# Patient Record
Sex: MALE | Race: BLACK OR AFRICAN AMERICAN | HISPANIC OR LATINO | Employment: OTHER | URBAN - METROPOLITAN AREA
[De-identification: names, ages, dates, MRNs, and addresses within clinical notes are randomized per-mention and may not be internally consistent; named-entity substitution may affect disease eponyms.]

---

## 2017-01-03 ENCOUNTER — ALLSCRIPTS OFFICE VISIT (OUTPATIENT)
Dept: OTHER | Facility: OTHER | Age: 62
End: 2017-01-03

## 2017-01-06 ENCOUNTER — ALLSCRIPTS OFFICE VISIT (OUTPATIENT)
Dept: OTHER | Facility: OTHER | Age: 62
End: 2017-01-06

## 2017-01-06 DIAGNOSIS — R97.20 ELEVATED PROSTATE SPECIFIC ANTIGEN (PSA): ICD-10-CM

## 2017-01-06 LAB
CLARITY UR: NORMAL
COLOR UR: YELLOW
GLUCOSE (HISTORICAL): NORMAL
HGB UR QL STRIP.AUTO: NORMAL
KETONES UR STRIP-MCNC: NORMAL MG/DL
LEUKOCYTE ESTERASE UR QL STRIP: NORMAL
NITRITE UR QL STRIP: NORMAL
PH UR STRIP.AUTO: 5 [PH]
PROT UR STRIP-MCNC: NORMAL MG/DL
SP GR UR STRIP.AUTO: 1.03

## 2017-01-17 ENCOUNTER — GENERIC CONVERSION - ENCOUNTER (OUTPATIENT)
Dept: OTHER | Facility: OTHER | Age: 62
End: 2017-01-17

## 2017-01-31 ENCOUNTER — LAB CONVERSION - ENCOUNTER (OUTPATIENT)
Dept: OTHER | Facility: OTHER | Age: 62
End: 2017-01-31

## 2017-01-31 LAB — TSH SERPL DL<=0.05 MIU/L-ACNC: 1.25 MIU/L (ref 0.4–4.5)

## 2017-02-03 ENCOUNTER — ALLSCRIPTS OFFICE VISIT (OUTPATIENT)
Dept: OTHER | Facility: OTHER | Age: 62
End: 2017-02-03

## 2017-02-08 ENCOUNTER — ALLSCRIPTS OFFICE VISIT (OUTPATIENT)
Dept: OTHER | Facility: OTHER | Age: 62
End: 2017-02-08

## 2017-02-09 ENCOUNTER — LAB CONVERSION - ENCOUNTER (OUTPATIENT)
Dept: OTHER | Facility: OTHER | Age: 62
End: 2017-02-09

## 2017-02-09 ENCOUNTER — GENERIC CONVERSION - ENCOUNTER (OUTPATIENT)
Dept: OTHER | Facility: OTHER | Age: 62
End: 2017-02-09

## 2017-02-09 LAB — FECAL GLOBIN BY IMMUNOCHEM (HISTORICAL): NORMAL

## 2017-02-20 ENCOUNTER — ALLSCRIPTS OFFICE VISIT (OUTPATIENT)
Dept: OTHER | Facility: OTHER | Age: 62
End: 2017-02-20

## 2017-05-20 DIAGNOSIS — C61 MALIGNANT NEOPLASM OF PROSTATE (HCC): ICD-10-CM

## 2017-05-22 ENCOUNTER — ALLSCRIPTS OFFICE VISIT (OUTPATIENT)
Dept: OTHER | Facility: OTHER | Age: 62
End: 2017-05-22

## 2017-08-01 ENCOUNTER — LAB CONVERSION - ENCOUNTER (OUTPATIENT)
Dept: OTHER | Facility: OTHER | Age: 62
End: 2017-08-01

## 2017-08-01 LAB
A/G RATIO (HISTORICAL): 1.4 (CALC) (ref 1–2.5)
ALBUMIN SERPL BCP-MCNC: 4.2 G/DL (ref 3.6–5.1)
ALP SERPL-CCNC: 57 U/L (ref 40–115)
ALT SERPL W P-5'-P-CCNC: 16 U/L (ref 9–46)
AST SERPL W P-5'-P-CCNC: 16 U/L (ref 10–35)
BILIRUB SERPL-MCNC: 0.4 MG/DL (ref 0.2–1.2)
BUN SERPL-MCNC: 22 MG/DL (ref 7–25)
BUN/CREA RATIO (HISTORICAL): 15 (CALC) (ref 6–22)
CALCIUM (ADJUSTED FOR ALBUMIN) (HISTORICAL): 10.2 MG/DL (CALC) (ref 8.6–10.2)
CALCIUM SERPL-MCNC: 10.1 MG/DL (ref 8.6–10.3)
CHLORIDE SERPL-SCNC: 105 MMOL/L (ref 98–110)
CHOLEST SERPL-MCNC: 174 MG/DL (ref 125–200)
CHOLEST/HDLC SERPL: 3.5 (CALC)
CO2 SERPL-SCNC: 27 MMOL/L (ref 20–31)
CREAT SERPL-MCNC: 1.48 MG/DL (ref 0.7–1.33)
DEPRECATED RDW RBC AUTO: 13.8 % (ref 11–15)
EGFR AFRICAN AMERICAN (HISTORICAL): 59 ML/MIN/1.73M2
EGFR-AMERICAN CALC (HISTORICAL): 51 ML/MIN/1.73M2
GAMMA GLOBULIN (HISTORICAL): 2.9 G/DL (CALC) (ref 1.9–3.7)
GLUCOSE (HISTORICAL): 87 MG/DL (ref 65–99)
HCT VFR BLD AUTO: 50.2 % (ref 38.5–50)
HDLC SERPL-MCNC: 50 MG/DL
HGB BLD-MCNC: 15.5 G/DL (ref 13.2–17.1)
LDL CHOLESTEROL (HISTORICAL): 106 MG/DL (CALC)
MCH RBC QN AUTO: 25.1 PG (ref 27–33)
MCHC RBC AUTO-ENTMCNC: 30.9 G/DL (ref 32–36)
MCV RBC AUTO: 81.4 FL (ref 80–100)
NON-HDL-CHOL (CHOL-HDL) (HISTORICAL): 124 MG/DL (CALC)
PLATELET # BLD AUTO: 236 THOUSAND/UL (ref 140–400)
PMV BLD AUTO: 10.1 FL (ref 7.5–12.5)
POTASSIUM SERPL-SCNC: 4.3 MMOL/L (ref 3.5–5.3)
RBC # BLD AUTO: 6.17 MILLION/UL (ref 4.2–5.8)
SODIUM SERPL-SCNC: 139 MMOL/L (ref 135–146)
TOTAL PROTEIN (HISTORICAL): 7.1 G/DL (ref 6.1–8.1)
TRIGL SERPL-MCNC: 90 MG/DL
WBC # BLD AUTO: 7.8 THOUSAND/UL (ref 3.8–10.8)

## 2017-08-08 ENCOUNTER — ALLSCRIPTS OFFICE VISIT (OUTPATIENT)
Dept: OTHER | Facility: OTHER | Age: 62
End: 2017-08-08

## 2017-08-09 ENCOUNTER — GENERIC CONVERSION - ENCOUNTER (OUTPATIENT)
Dept: OTHER | Facility: OTHER | Age: 62
End: 2017-08-09

## 2017-08-09 LAB
FOLATE SERPL-MCNC: 13 NG/ML
VIT B12 SERPL-MCNC: 422 PG/ML (ref 200–1100)

## 2017-08-29 ENCOUNTER — ALLSCRIPTS OFFICE VISIT (OUTPATIENT)
Dept: OTHER | Facility: OTHER | Age: 62
End: 2017-08-29

## 2017-08-29 DIAGNOSIS — R80.9 PROTEINURIA: ICD-10-CM

## 2017-08-29 DIAGNOSIS — R31.21 ASYMPTOMATIC MICROSCOPIC HEMATURIA: ICD-10-CM

## 2017-08-29 DIAGNOSIS — I10 ESSENTIAL (PRIMARY) HYPERTENSION: ICD-10-CM

## 2017-08-29 DIAGNOSIS — N18.2 CHRONIC KIDNEY DISEASE, STAGE II (MILD): ICD-10-CM

## 2017-08-29 LAB
BILIRUB UR QL STRIP: NEGATIVE
CLARITY UR: NORMAL
COLOR UR: YELLOW
GLUCOSE (HISTORICAL): NEGATIVE
HGB UR QL STRIP.AUTO: NORMAL
KETONES UR STRIP-MCNC: NEGATIVE MG/DL
LEUKOCYTE ESTERASE UR QL STRIP: NEGATIVE
NITRITE UR QL STRIP: NEGATIVE
PH UR STRIP.AUTO: 6 [PH]
PROT UR STRIP-MCNC: 30 MG/DL
SP GR UR STRIP.AUTO: 1.01
UROBILINOGEN UR QL STRIP.AUTO: 0.2

## 2017-08-30 ENCOUNTER — TRANSCRIBE ORDERS (OUTPATIENT)
Dept: ADMINISTRATIVE | Facility: HOSPITAL | Age: 62
End: 2017-08-30

## 2017-08-30 DIAGNOSIS — R31.21 ASYMPTOMATIC MICROSCOPIC HEMATURIA: Primary | ICD-10-CM

## 2017-08-30 DIAGNOSIS — R80.9 PROTEINURIA, UNSPECIFIED TYPE: ICD-10-CM

## 2017-08-31 ENCOUNTER — HOSPITAL ENCOUNTER (OUTPATIENT)
Dept: ULTRASOUND IMAGING | Facility: HOSPITAL | Age: 62
Discharge: HOME | End: 2017-08-31
Attending: INTERNAL MEDICINE
Payer: COMMERCIAL

## 2017-08-31 DIAGNOSIS — R80.9 PROTEINURIA, UNSPECIFIED TYPE: ICD-10-CM

## 2017-08-31 DIAGNOSIS — R31.21 ASYMPTOMATIC MICROSCOPIC HEMATURIA: ICD-10-CM

## 2017-08-31 PROCEDURE — 76770 US EXAM ABDO BACK WALL COMP: CPT

## 2017-09-22 ENCOUNTER — LAB CONVERSION - ENCOUNTER (OUTPATIENT)
Dept: OTHER | Facility: OTHER | Age: 62
End: 2017-09-22

## 2017-09-22 ENCOUNTER — GENERIC CONVERSION - ENCOUNTER (OUTPATIENT)
Dept: OTHER | Facility: OTHER | Age: 62
End: 2017-09-22

## 2017-09-22 DIAGNOSIS — C61 MALIGNANT NEOPLASM OF PROSTATE (HCC): ICD-10-CM

## 2017-09-22 DIAGNOSIS — E83.52 HYPERCALCEMIA: ICD-10-CM

## 2017-09-22 LAB
BACTERIA UR QL AUTO: NORMAL /HPF
BASOPHILS # BLD AUTO: 0.3 %
BASOPHILS # BLD AUTO: 22 CELLS/UL (ref 0–200)
BUN SERPL-MCNC: 22 MG/DL (ref 7–25)
BUN/CREA RATIO (HISTORICAL): 14 (CALC) (ref 6–22)
CALCIUM SERPL-MCNC: 10.4 MG/DL (ref 8.6–10.3)
CHLORIDE SERPL-SCNC: 102 MMOL/L (ref 98–110)
CO2 SERPL-SCNC: 26 MMOL/L (ref 20–31)
CREAT SERPL-MCNC: 1.57 MG/DL (ref 0.7–1.33)
CREATININE, RANDOM URINE (HISTORICAL): 156 MG/DL (ref 20–370)
DEPRECATED RDW RBC AUTO: 14.2 % (ref 11–15)
EGFR AFRICAN AMERICAN (HISTORICAL): 55 ML/MIN/1.73M2
EGFR-AMERICAN CALC (HISTORICAL): 48 ML/MIN/1.73M2
EOSINOPHIL # BLD AUTO: 263 CELLS/UL (ref 15–500)
EOSINOPHIL # BLD AUTO: 3.6 %
GLUCOSE (HISTORICAL): 99 MG/DL (ref 65–99)
HCT VFR BLD AUTO: 50.4 % (ref 38.5–50)
HGB BLD-MCNC: 16 G/DL (ref 13.2–17.1)
HYALINE CASTS #/AREA URNS LPF: NORMAL /LPF
LYMPHOCYTES # BLD AUTO: 1628 CELLS/UL (ref 850–3900)
LYMPHOCYTES # BLD AUTO: 22.3 %
MCH RBC QN AUTO: 25.9 PG (ref 27–33)
MCHC RBC AUTO-ENTMCNC: 31.7 G/DL (ref 32–36)
MCV RBC AUTO: 81.7 FL (ref 80–100)
MONOCYTES # BLD AUTO: 599 CELLS/UL (ref 200–950)
MONOCYTES (HISTORICAL): 8.2 %
NEUTROPHILS # BLD AUTO: 4789 CELLS/UL (ref 1500–7800)
NEUTROPHILS # BLD AUTO: 65.6 %
PLATELET # BLD AUTO: 220 THOUSAND/UL (ref 140–400)
PMV BLD AUTO: 10.7 FL (ref 7.5–12.5)
POTASSIUM SERPL-SCNC: 4.2 MMOL/L (ref 3.5–5.3)
PROT UR-MCNC: 43 MG/DL (ref 5–25)
PROT/CREAT UR: 276 MG/G CREAT (ref 22–128)
RBC # BLD AUTO: 6.17 MILLION/UL (ref 4.2–5.8)
RBC (HISTORICAL): NORMAL /HPF
SODIUM SERPL-SCNC: 137 MMOL/L (ref 135–146)
SQUAMOUS EPITHELIAL CELLS (HISTORICAL): NORMAL /HPF
WBC # BLD AUTO: 7.3 THOUSAND/UL (ref 3.8–10.8)
WBC # BLD AUTO: NORMAL /HPF

## 2017-09-28 ENCOUNTER — LAB CONVERSION - ENCOUNTER (OUTPATIENT)
Dept: OTHER | Facility: OTHER | Age: 62
End: 2017-09-28

## 2017-09-28 LAB
CALCIUM SERPL-MCNC: 10.4 MG/DL (ref 8.6–10.3)
PTH-INTACT SERPL-MCNC: 58 PG/ML (ref 14–64)

## 2017-10-01 ENCOUNTER — LAB CONVERSION - ENCOUNTER (OUTPATIENT)
Dept: OTHER | Facility: OTHER | Age: 62
End: 2017-10-01

## 2017-10-01 LAB
ANGIOTENSIN CONVERTING ENZYME CSF (HISTORICAL): 6 U/L (ref 9–67)
BUN SERPL-MCNC: 21 MG/DL (ref 7–25)
BUN/CREA RATIO (HISTORICAL): 15 (CALC) (ref 6–22)
CALCIUM IONIZED (HISTORICAL): 6 MG/DL (ref 4.8–5.6)
CALCIUM SERPL-MCNC: 10.4 MG/DL (ref 8.6–10.3)
CALCIUM SERPL-MCNC: 10.4 MG/DL (ref 8.6–10.3)
CHLORIDE SERPL-SCNC: 103 MMOL/L (ref 98–110)
CO2 SERPL-SCNC: 27 MMOL/L (ref 20–31)
CREAT SERPL-MCNC: 1.41 MG/DL (ref 0.7–1.33)
EGFR AFRICAN AMERICAN (HISTORICAL): 63 ML/MIN/1.73M2
EGFR-AMERICAN CALC (HISTORICAL): 54 ML/MIN/1.73M2
GLUCOSE (HISTORICAL): 76 MG/DL (ref 65–99)
PHOSPHATE SERPL-MCNC: 3.4 MG/DL (ref 2.5–4.5)
POTASSIUM SERPL-SCNC: 4.4 MMOL/L (ref 3.5–5.3)
PTH RELATED PEPTIDE (HISTORICAL): 15 PG/ML (ref 14–27)
PTH-INTACT SERPL-MCNC: 58 PG/ML (ref 14–64)
SODIUM SERPL-SCNC: 139 MMOL/L (ref 135–146)

## 2017-10-04 ENCOUNTER — GENERIC CONVERSION - ENCOUNTER (OUTPATIENT)
Dept: OTHER | Facility: OTHER | Age: 62
End: 2017-10-04

## 2017-10-09 ENCOUNTER — GENERIC CONVERSION - ENCOUNTER (OUTPATIENT)
Dept: OTHER | Facility: OTHER | Age: 62
End: 2017-10-09

## 2017-11-02 ENCOUNTER — GENERIC CONVERSION - ENCOUNTER (OUTPATIENT)
Dept: OTHER | Facility: OTHER | Age: 62
End: 2017-11-02

## 2017-11-20 ENCOUNTER — LAB CONVERSION - ENCOUNTER (OUTPATIENT)
Dept: OTHER | Facility: OTHER | Age: 62
End: 2017-11-20

## 2017-11-20 LAB — TOTAL PROTEIN (HISTORICAL): 7 G/DL (ref 6.1–8.1)

## 2017-11-21 ENCOUNTER — LAB CONVERSION - ENCOUNTER (OUTPATIENT)
Dept: OTHER | Facility: OTHER | Age: 62
End: 2017-11-21

## 2017-11-21 LAB
ABNORMAL PROTEIN BAND 1 (HISTORICAL): 0.2 G/DL
ALBUMIN SERPL BCP-MCNC: 4.1 G/DL (ref 3.8–4.8)
ALPHA 1 (HISTORICAL): 0.3 G/DL (ref 0.2–0.3)
ALPHA 2 (HISTORICAL): 0.6 G/DL (ref 0.5–0.9)
BETA-1 (HISTORICAL): 0.5 G/DL (ref 0.4–0.6)
BETA-2 (HISTORICAL): 0.4 G/DL (ref 0.2–0.5)
GAMMA GLOBULIN (HISTORICAL): 1.2 G/DL (ref 0.8–1.7)
IMMUNOGLOBULIN KAPPA FREE LIGHT CHAIN (HISTORICAL): 23.8 MG/L (ref 3.3–19.4)
IMMUNOGLOBULIN LAMBDA FREE LIGHT CHAIN (HISTORICAL): 18.8 MG/L (ref 5.7–26.3)
INTERPRETATION (HISTORICAL): ABNORMAL
KAPPA/LAMBDA FREE LIGHT CHAIN RATIO (HISTORICAL): 1.27 (ref 0.26–1.65)
TOTAL PROTEIN (HISTORICAL): 7 G/DL (ref 6.1–8.1)

## 2017-11-22 ENCOUNTER — LAB CONVERSION - ENCOUNTER (OUTPATIENT)
Dept: OTHER | Facility: OTHER | Age: 62
End: 2017-11-22

## 2017-11-22 LAB
CREATININE, RANDOM URINE (HISTORICAL): 119 MG/DL (ref 20–370)
PROT UR-MCNC: 38 MG/DL (ref 5–25)
PROT/CREAT UR: 319 MG/G CREAT (ref 22–128)

## 2017-11-24 ENCOUNTER — LAB CONVERSION - ENCOUNTER (OUTPATIENT)
Dept: OTHER | Facility: OTHER | Age: 62
End: 2017-11-24

## 2017-11-24 LAB
ALBUMIN SERPL BCP-MCNC: 78 %
ALPHA 1 (HISTORICAL): 4 %
ALPHA 2 (HISTORICAL): 4 %
BETA-2 MICROGLOBULIN (HISTORICAL): 9 %
CREATININE, RANDOM URINE (HISTORICAL): 119 MG/DL (ref 20–370)
GAMMA GLOBULIN (HISTORICAL): 6 %
INTERPRETATION (HISTORICAL): ABNORMAL
PROT UR-MCNC: 38 MG/DL (ref 5–25)
PROT/CREAT UR: 319 MG/G CREAT (ref 22–128)

## 2017-11-27 ENCOUNTER — GENERIC CONVERSION - ENCOUNTER (OUTPATIENT)
Dept: OTHER | Facility: OTHER | Age: 62
End: 2017-11-27

## 2017-11-29 ENCOUNTER — ALLSCRIPTS OFFICE VISIT (OUTPATIENT)
Dept: OTHER | Facility: OTHER | Age: 62
End: 2017-11-29

## 2017-11-29 DIAGNOSIS — N18.2 CHRONIC KIDNEY DISEASE, STAGE II (MILD): ICD-10-CM

## 2017-11-29 DIAGNOSIS — E83.52 HYPERCALCEMIA: ICD-10-CM

## 2017-11-29 DIAGNOSIS — R31.21 ASYMPTOMATIC MICROSCOPIC HEMATURIA: ICD-10-CM

## 2017-12-01 ENCOUNTER — LAB CONVERSION - ENCOUNTER (OUTPATIENT)
Dept: OTHER | Facility: OTHER | Age: 62
End: 2017-12-01

## 2017-12-01 LAB
A/G RATIO (HISTORICAL): 1.4 (CALC) (ref 1–2.5)
ALBUMIN SERPL BCP-MCNC: 4.2 G/DL (ref 3.6–5.1)
ALP SERPL-CCNC: 54 U/L (ref 40–115)
ALT SERPL W P-5'-P-CCNC: 16 U/L (ref 9–46)
AST SERPL W P-5'-P-CCNC: 14 U/L (ref 10–35)
BACTERIA UR QL AUTO: NORMAL /HPF
BILIRUB SERPL-MCNC: 0.5 MG/DL (ref 0.2–1.2)
BUN SERPL-MCNC: 22 MG/DL (ref 7–25)
BUN/CREA RATIO (HISTORICAL): 15 (CALC) (ref 6–22)
CALCIUM IONIZED (HISTORICAL): 5.8 MG/DL (ref 4.8–5.6)
CALCIUM SERPL-MCNC: 9.9 MG/DL (ref 8.6–10.3)
CALCIUM SERPL-MCNC: 9.9 MG/DL (ref 8.6–10.3)
CHLORIDE SERPL-SCNC: 102 MMOL/L (ref 98–110)
CO2 SERPL-SCNC: 27 MMOL/L (ref 20–31)
CORTIS AM PEAK SERPL-SCNC: 15 MCG/DL
CREAT SERPL-MCNC: 1.48 MG/DL (ref 0.7–1.25)
EGFR AFRICAN AMERICAN (HISTORICAL): 59 ML/MIN/1.73M2
EGFR-AMERICAN CALC (HISTORICAL): 51 ML/MIN/1.73M2
GAMMA GLOBULIN (HISTORICAL): 2.9 G/DL (CALC) (ref 1.9–3.7)
GLUCOSE (HISTORICAL): 99 MG/DL (ref 65–99)
HYALINE CASTS #/AREA URNS LPF: NORMAL /LPF
PHOSPHATE SERPL-MCNC: 2.9 MG/DL (ref 2.5–4.5)
POTASSIUM SERPL-SCNC: 4.1 MMOL/L (ref 3.5–5.3)
PTH-INTACT SERPL-MCNC: 49 PG/ML (ref 14–64)
RBC (HISTORICAL): NORMAL /HPF
SODIUM SERPL-SCNC: 138 MMOL/L (ref 135–146)
SQUAMOUS EPITHELIAL CELLS (HISTORICAL): NORMAL /HPF
TOTAL PROTEIN (HISTORICAL): 7.1 G/DL (ref 6.1–8.1)
WBC # BLD AUTO: NORMAL /HPF

## 2017-12-04 ENCOUNTER — LAB CONVERSION - ENCOUNTER (OUTPATIENT)
Dept: OTHER | Facility: OTHER | Age: 62
End: 2017-12-04

## 2017-12-06 ENCOUNTER — LAB CONVERSION - ENCOUNTER (OUTPATIENT)
Dept: OTHER | Facility: OTHER | Age: 62
End: 2017-12-06

## 2017-12-06 LAB
A/G RATIO (HISTORICAL): 1.4 (CALC) (ref 1–2.5)
ALBUMIN SERPL BCP-MCNC: 4.2 G/DL (ref 3.6–5.1)
ALP SERPL-CCNC: 54 U/L (ref 40–115)
ALT SERPL W P-5'-P-CCNC: 16 U/L (ref 9–46)
AST SERPL W P-5'-P-CCNC: 14 U/L (ref 10–35)
BACTERIA UR QL AUTO: NORMAL /HPF
BILIRUB SERPL-MCNC: 0.5 MG/DL (ref 0.2–1.2)
BUN SERPL-MCNC: 22 MG/DL (ref 7–25)
BUN/CREA RATIO (HISTORICAL): 15 (CALC) (ref 6–22)
CALCIUM IONIZED (HISTORICAL): 5.8 MG/DL (ref 4.8–5.6)
CALCIUM SERPL-MCNC: 9.9 MG/DL (ref 8.6–10.3)
CALCIUM SERPL-MCNC: 9.9 MG/DL (ref 8.6–10.3)
CHLORIDE SERPL-SCNC: 102 MMOL/L (ref 98–110)
CO2 SERPL-SCNC: 27 MMOL/L (ref 20–31)
CORTIS AM PEAK SERPL-SCNC: 15 MCG/DL
CREAT SERPL-MCNC: 1.48 MG/DL (ref 0.7–1.25)
EGFR AFRICAN AMERICAN (HISTORICAL): 59 ML/MIN/1.73M2
EGFR-AMERICAN CALC (HISTORICAL): 51 ML/MIN/1.73M2
GAMMA GLOBULIN (HISTORICAL): 2.9 G/DL (CALC) (ref 1.9–3.7)
GLUCOSE (HISTORICAL): 99 MG/DL (ref 65–99)
HYALINE CASTS #/AREA URNS LPF: NORMAL /LPF
PHOSPHATE SERPL-MCNC: 2.9 MG/DL (ref 2.5–4.5)
POTASSIUM SERPL-SCNC: 4.1 MMOL/L (ref 3.5–5.3)
PTH RELATED PEPTIDE (HISTORICAL): 13 PG/ML (ref 14–27)
PTH-INTACT SERPL-MCNC: 49 PG/ML (ref 14–64)
RBC (HISTORICAL): NORMAL /HPF
SODIUM SERPL-SCNC: 138 MMOL/L (ref 135–146)
SQUAMOUS EPITHELIAL CELLS (HISTORICAL): NORMAL /HPF
TOTAL PROTEIN (HISTORICAL): 7.1 G/DL (ref 6.1–8.1)
WBC # BLD AUTO: NORMAL /HPF

## 2017-12-12 ENCOUNTER — LAB CONVERSION - ENCOUNTER (OUTPATIENT)
Dept: OTHER | Facility: OTHER | Age: 62
End: 2017-12-12

## 2017-12-12 LAB
A/G RATIO (HISTORICAL): 1.4 (CALC) (ref 1–2.5)
ALBUMIN SERPL BCP-MCNC: 4.2 G/DL (ref 3.6–5.1)
ALP SERPL-CCNC: 54 U/L (ref 40–115)
ALT SERPL W P-5'-P-CCNC: 16 U/L (ref 9–46)
AST SERPL W P-5'-P-CCNC: 14 U/L (ref 10–35)
BACTERIA UR QL AUTO: NORMAL /HPF
BILIRUB SERPL-MCNC: 0.5 MG/DL (ref 0.2–1.2)
BUN SERPL-MCNC: 22 MG/DL (ref 7–25)
BUN/CREA RATIO (HISTORICAL): 15 (CALC) (ref 6–22)
CALCIUM IONIZED (HISTORICAL): 5.8 MG/DL (ref 4.8–5.6)
CALCIUM SERPL-MCNC: 9.9 MG/DL (ref 8.6–10.3)
CALCIUM SERPL-MCNC: 9.9 MG/DL (ref 8.6–10.3)
CHLORIDE SERPL-SCNC: 102 MMOL/L (ref 98–110)
CO2 SERPL-SCNC: 27 MMOL/L (ref 20–31)
CORTIS AM PEAK SERPL-SCNC: 15 MCG/DL
CREAT SERPL-MCNC: 1.48 MG/DL (ref 0.7–1.25)
EGFR AFRICAN AMERICAN (HISTORICAL): 59 ML/MIN/1.73M2
EGFR-AMERICAN CALC (HISTORICAL): 51 ML/MIN/1.73M2
GAMMA GLOBULIN (HISTORICAL): 2.9 G/DL (CALC) (ref 1.9–3.7)
GLUCOSE (HISTORICAL): 99 MG/DL (ref 65–99)
HYALINE CASTS #/AREA URNS LPF: NORMAL /LPF
Lab: NORMAL
PHOSPHATE SERPL-MCNC: 2.9 MG/DL (ref 2.5–4.5)
POTASSIUM SERPL-SCNC: 4.1 MMOL/L (ref 3.5–5.3)
PTH RELATED PEPTIDE (HISTORICAL): 13 PG/ML (ref 14–27)
PTH-INTACT SERPL-MCNC: 49 PG/ML (ref 14–64)
RBC (HISTORICAL): NORMAL /HPF
SODIUM SERPL-SCNC: 138 MMOL/L (ref 135–146)
SQUAMOUS EPITHELIAL CELLS (HISTORICAL): NORMAL /HPF
TOTAL PROTEIN (HISTORICAL): 7.1 G/DL (ref 6.1–8.1)
TSH SERPL DL<=0.05 MIU/L-ACNC: 1.3 MIU/L (ref 0.4–4.5)
VITAMIN D 1,25-DIHYDROXY (HISTORICAL): 42 PG/ML
VITAMIN D 1,25-DIHYDROXY (HISTORICAL): 42 PG/ML (ref 18–72)
VITAMIN D 1,25-DIHYDROXY (HISTORICAL): <8 PG/ML
WBC # BLD AUTO: NORMAL /HPF

## 2018-01-10 NOTE — MISCELLANEOUS
Message   Recorded as Task   Date: 09/22/2017 02:25 PM, Created By: Diaz Lowry   Task Name: Care Coordination   Assigned To: Kenji Cool   Regarding Patient: Aster Barlow, Status: Active   Comment:    Diaz Lowry - 22 Sep 2017 2:25 PM     TASK CREATED  Please fax the labs I ordered today to [facility] as he gets labs done there  Thanks   [facility] to [facility]  CR      Active Problems    1  Abnormal RBC (790 09) (R71 8)   2  Adenocarcinoma of prostate (185) (C61)   3  Asymptomatic microscopic hematuria (599 72) (R31 21)   4  Colon polyps (211 3) (K63 5)   5  Dermatitis (692 9) (L30 9)   6  Encounter for screening colonoscopy (V76 51) (Z12 11)   7  Denied: History of mental disorder   8  Hypercalcemia (275 42) (E83 52)   9  Hyperlipidemia (272 4) (E78 5)   10  Hypertension (401 9) (I10)   11  Need for immunization against influenza (V04 81) (Z23)   12  Proteinuria (791 0) (R80 9)   13  Psoriasis (696 1) (L40 9)   14  Special screening examination for neoplasm of prostate (V76 44) (Z12 5)   15  Stage 2 chronic kidney disease (585 2) (N18 2)   16  Tinea cruris (110 3) (B35 6)    Current Meds   1  Lisinopril 20 MG Oral Tablet; TAKE 1 AND 1/2 TABLETS  DAILY; Therapy: 02NCQ8164 to (Evaluate:17Kay9917)  Requested for: 72Yzo3473; Last   Rx:[id number] Ordered   2  Lovastatin 40 MG Oral Tablet; Take 1 tablet daily; Therapy: 99UEB8446 to )  Requested for: 47ITG4632; Last   Rx:[id number] Ordered    Allergies    1  Fenofibrate TABS   2   Penicillins    Signatures   Electronically signed by : Roberta Dominguez DO; Sep 25 2017  1:31PM EST                       (Author)

## 2018-01-11 NOTE — RESULT NOTES
Verified Results  (Q) FECAL GLOBIN BY IMMUNOCHEMISTRY [id number] 12:00AM Leopoldo Byars     Test Name Result Flag Reference   FECAL GLOBIN BY$IMMUNOCHEMISTRY      FECAL GLOBIN BY IMMUNOCHEMISTRY         MICRO NUMBER:      [id number]    TEST STATUS:       FINAL    SPECIMEN SOURCE:   INSURE (TM) FOBT TEST CARD    SPECIMEN QUALITY:  ADEQUATE    RESULT:            Not Detected

## 2018-01-11 NOTE — RESULT NOTES
Verified Results  (Q) VITAMIN B12/FOLATE, SERUM PANEL [id number] 08:24AM Selina Shannon     Test Name Result Flag Reference   VITAMIN B12 422 pg/mL  200-1100   FOLATE, SERUM 13 0 ng/mL     Reference Range                             Low:           <3 4                             Borderline:    3 4-5 4                             Normal:        >5 4

## 2018-01-11 NOTE — MISCELLANEOUS
Message   Recorded as Task   Date: 09/22/2017 09:47 AM, Created By: Jose Rodriguez   Task Name: Care Coordination   Assigned To: Nathan Coronado   Regarding Patient: Sylvia Duarte, Status: In Progress   Comment:    Jessi Mckenna - 22 Sep 2017 9:47 AM     TASK CREATED  Please call the patient and inform him he should drink more water and repeat bloodwork next week as his calcium level is a bit high  BMP has been ordered  I think he may be dehydrated  Thanks  Anel Carrillo - 22 Sep 2017 10:00 AM     TASK IN PROGRESS   Anel Carrillo - 22 Sep 2017 10:02 AM     TASK EDITED  Left message to call office   Patient is aware he is to drink more water and repeat blood work next week  Lab order was sent to him by mail  CR      Active Problems    1  Abnormal RBC (790 09) (R71 8)   2  Adenocarcinoma of prostate (185) (C61)   3  Asymptomatic microscopic hematuria (599 72) (R31 21)   4  Colon polyps (211 3) (K63 5)   5  Dermatitis (692 9) (L30 9)   6  Encounter for screening colonoscopy (V76 51) (Z12 11)   7  Denied: History of mental disorder   8  Hypercalcemia (275 42) (E83 52)   9  Hyperlipidemia (272 4) (E78 5)   10  Hypertension (401 9) (I10)   11  Need for immunization against influenza (V04 81) (Z23)   12  Proteinuria (791 0) (R80 9)   13  Psoriasis (696 1) (L40 9)   14  Special screening examination for neoplasm of prostate (V76 44) (Z12 5)   15  Stage 2 chronic kidney disease (585 2) (N18 2)   16  Tinea cruris (110 3) (B35 6)    Current Meds   1  Lisinopril 20 MG Oral Tablet; TAKE 1 AND 1/2 TABLETS  DAILY; Therapy: 75EEE6662 to (Evaluate:00Lkc7405)  Requested for: 58Ktl1579; Last   Rx:[id number] Ordered   2  Lovastatin 40 MG Oral Tablet; Take 1 tablet daily; Therapy: 49KVK0646 to 450 39 173)  Requested for: 86YWU4745; Last   Rx:[id number] Ordered    Allergies    1  Fenofibrate TABS   2   Penicillins    Signatures   Electronically signed by : Evangelina Soni DO; Sep 22 2017 11:28AM EST (Author)

## 2018-01-12 VITALS
WEIGHT: 198 LBS | SYSTOLIC BLOOD PRESSURE: 142 MMHG | DIASTOLIC BLOOD PRESSURE: 80 MMHG | HEART RATE: 72 BPM | HEIGHT: 70 IN | BODY MASS INDEX: 28.35 KG/M2

## 2018-01-12 VITALS
DIASTOLIC BLOOD PRESSURE: 92 MMHG | HEIGHT: 70 IN | BODY MASS INDEX: 28.42 KG/M2 | WEIGHT: 198.5 LBS | SYSTOLIC BLOOD PRESSURE: 144 MMHG | HEART RATE: 72 BPM

## 2018-01-13 VITALS
HEART RATE: 80 BPM | HEIGHT: 70 IN | WEIGHT: 201 LBS | DIASTOLIC BLOOD PRESSURE: 88 MMHG | BODY MASS INDEX: 28.77 KG/M2 | SYSTOLIC BLOOD PRESSURE: 140 MMHG

## 2018-01-13 VITALS
HEART RATE: 80 BPM | DIASTOLIC BLOOD PRESSURE: 80 MMHG | SYSTOLIC BLOOD PRESSURE: 136 MMHG | BODY MASS INDEX: 28.54 KG/M2 | HEIGHT: 70 IN | WEIGHT: 199.38 LBS

## 2018-01-13 VITALS
BODY MASS INDEX: 28.63 KG/M2 | HEIGHT: 70 IN | WEIGHT: 200 LBS | DIASTOLIC BLOOD PRESSURE: 90 MMHG | HEART RATE: 72 BPM | SYSTOLIC BLOOD PRESSURE: 174 MMHG

## 2018-01-14 VITALS
WEIGHT: 204 LBS | HEART RATE: 68 BPM | DIASTOLIC BLOOD PRESSURE: 80 MMHG | BODY MASS INDEX: 29.27 KG/M2 | SYSTOLIC BLOOD PRESSURE: 150 MMHG

## 2018-01-14 VITALS
DIASTOLIC BLOOD PRESSURE: 90 MMHG | WEIGHT: 204.13 LBS | HEIGHT: 70 IN | HEART RATE: 80 BPM | BODY MASS INDEX: 29.22 KG/M2 | SYSTOLIC BLOOD PRESSURE: 160 MMHG

## 2018-01-14 VITALS
SYSTOLIC BLOOD PRESSURE: 160 MMHG | BODY MASS INDEX: 29.26 KG/M2 | HEART RATE: 78 BPM | HEIGHT: 70 IN | DIASTOLIC BLOOD PRESSURE: 100 MMHG | WEIGHT: 204.38 LBS

## 2018-01-14 VITALS
HEART RATE: 68 BPM | DIASTOLIC BLOOD PRESSURE: 82 MMHG | HEIGHT: 70 IN | BODY MASS INDEX: 28.63 KG/M2 | SYSTOLIC BLOOD PRESSURE: 144 MMHG | WEIGHT: 200 LBS

## 2018-01-14 NOTE — RESULT NOTES
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

## 2018-01-15 NOTE — MISCELLANEOUS
Message   Recorded as Task   Date: 10/02/2017 04:58 PM, Created By: Douglas Godinez   Task Name: Care Coordination   Assigned To: [address]   Regarding Patient: Julius Albrecht, Status: In Progress   Dario Ho - 02 Oct 2017 4:58 PM     TASK CREATED  Please inform the patient his calcium is still borderline high so I have ordered further workup in the form of bloodwork(SPEP/UPEP/FLC and CMP already ordered)  He should obtain this prior to next appointment with me  Will also reevaluate his calcium level with this bloodwork  Thanks   Anel Carrillo - 05 Oct 2017 12:57 PM     TASK IN PROGRESS   Anel Carrillo - 05 Oct 2017 1:37 PM     TASK EDITED  Left message to call office   Patient was informed his calcium is borderline high and he should repeat blood work  Orders for blood work were to patient by mail  CR      Active Problems    1  Abnormal RBC (790 09) (R71 8)   2  Adenocarcinoma of prostate (185) (C61)   3  Asymptomatic microscopic hematuria (599 72) (R31 21)   4  Colon polyps (211 3) (K63 5)   5  Dermatitis (692 9) (L30 9)   6  Encounter for screening colonoscopy (V76 51) (Z12 11)   7  Denied: History of mental disorder   8  Hypercalcemia (275 42) (E83 52)   9  Hyperlipidemia (272 4) (E78 5)   10  Hypertension (401 9) (I10)   11  Need for immunization against influenza (V04 81) (Z23)   12  Proteinuria (791 0) (R80 9)   13  Psoriasis (696 1) (L40 9)   14  Special screening examination for neoplasm of prostate (V76 44) (Z12 5)   15  Stage 2 chronic kidney disease (585 2) (N18 2)   16  Tinea cruris (110 3) (B35 6)    Current Meds   1  Lisinopril 20 MG Oral Tablet; TAKE 1 AND 1/2 TABLETS  DAILY; Therapy: 66ADI3975 to (Evaluate:75Eie9472)  Requested for: 92Upr2504; Last   Rx:86Tki9977 Ordered   2  Lovastatin 40 MG Oral Tablet; Take 1 tablet daily; Therapy: 87GCV4835 to [id number])  Requested for: 66ODU4007; Last   Rx:31Jan2017 Ordered    Allergies    1  Fenofibrate TABS   2  Penicillins    Signatures   Electronically signed by : Lala Guthrie DO; Oct 11 2017  5:25PM EST                       (Author)

## 2018-01-16 NOTE — RESULT NOTES
Verified Results  (Q) FECAL GLOBIN BY IMMUNOCHEMISTRY [id number] 12:00AM Milind Nadeem   REPORT COMMENT:  COLLECTION DATE [id number] AND [id number]     Test Name Result Flag Reference   FECAL GLOBIN BY$IMMUNOCHEMISTRY      FECAL GLOBIN BY IMMUNOCHEMISTRY         MICRO NUMBER:      [id number]    TEST STATUS:       FINAL    SPECIMEN SOURCE:   INSURE (TM) FOBT TEST CARD    SPECIMEN QUALITY:  ADEQUATE    RESULT:            Not Detected

## 2018-01-22 VITALS
HEART RATE: 80 BPM | BODY MASS INDEX: 28.12 KG/M2 | WEIGHT: 196 LBS | DIASTOLIC BLOOD PRESSURE: 80 MMHG | SYSTOLIC BLOOD PRESSURE: 140 MMHG

## 2018-02-04 DIAGNOSIS — C61 MALIGNANT NEOPLASM OF PROSTATE (HCC): ICD-10-CM

## 2018-02-06 LAB
ALBUMIN SERPL-MCNC: 4.2 G/DL (ref 3.6–5.1)
ALBUMIN/GLOB SERPL: 1.5 (CALC) (ref 1–2.5)
ALP SERPL-CCNC: 57 U/L (ref 40–115)
ALT SERPL-CCNC: 14 U/L (ref 9–46)
AST SERPL-CCNC: 14 U/L (ref 10–35)
BILIRUB SERPL-MCNC: 0.5 MG/DL (ref 0.2–1.2)
BUN SERPL-MCNC: 26 MG/DL (ref 7–25)
BUN/CREAT SERPL: 16 (CALC) (ref 6–22)
CALCIUM ALBUM COR SERPL-MCNC: 10.5 MG/DL (CALC) (ref 8.6–10.2)
CALCIUM SERPL-MCNC: 10.4 MG/DL (ref 8.6–10.3)
CHLORIDE SERPL-SCNC: 103 MMOL/L (ref 98–110)
CHOLEST SERPL-MCNC: 167 MG/DL
CHOLEST/HDLC SERPL: 4 (CALC)
CO2 SERPL-SCNC: 26 MMOL/L (ref 20–31)
CREAT SERPL-MCNC: 1.58 MG/DL (ref 0.7–1.25)
ERYTHROCYTE [DISTWIDTH] IN BLOOD BY AUTOMATED COUNT: 13.7 % (ref 11–15)
GLOBULIN SER CALC-MCNC: 2.8 G/DL (CALC) (ref 1.9–3.7)
GLUCOSE SERPL-MCNC: 85 MG/DL (ref 65–99)
HCT VFR BLD AUTO: 49.2 % (ref 38.5–50)
HDLC SERPL-MCNC: 42 MG/DL
HGB BLD-MCNC: 15.8 G/DL (ref 13.2–17.1)
LDLC SERPL CALC-MCNC: 104 MG/DL (CALC)
MCH RBC QN AUTO: 26.1 PG (ref 27–33)
MCHC RBC AUTO-ENTMCNC: 32.1 G/DL (ref 32–36)
MCV RBC AUTO: 81.2 FL (ref 80–100)
NONHDLC SERPL-MCNC: 125 MG/DL (CALC)
PLATELET # BLD AUTO: 239 THOUSAND/UL (ref 140–400)
PMV BLD REES-ECKER: 10.5 FL (ref 7.5–12.5)
POTASSIUM SERPL-SCNC: 4.3 MMOL/L (ref 3.5–5.3)
PROT SERPL-MCNC: 7 G/DL (ref 6.1–8.1)
RBC # BLD AUTO: 6.06 MILLION/UL (ref 4.2–5.8)
SL AMB EGFR AFRICAN AMERICAN: 54 ML/MIN/1.73M2
SL AMB EGFR NON AFRICAN AMERICAN: 47 ML/MIN/1.73M2
SODIUM SERPL-SCNC: 137 MMOL/L (ref 135–146)
TRIGL SERPL-MCNC: 115 MG/DL
TSH SERPL-ACNC: 1.6 MIU/L (ref 0.4–4.5)
WBC # BLD AUTO: 7.9 THOUSAND/UL (ref 3.8–10.8)

## 2018-02-08 DIAGNOSIS — C61 PROSTATE CANCER (HCC): Primary | ICD-10-CM

## 2018-02-08 RX ORDER — CIPROFLOXACIN 500 MG/1
500 TABLET, FILM COATED ORAL EVERY 12 HOURS SCHEDULED
Qty: 6 TABLET | Refills: 0 | Status: SHIPPED | OUTPATIENT
Start: 2018-02-08 | End: 2018-02-11

## 2018-02-13 ENCOUNTER — OFFICE VISIT (OUTPATIENT)
Dept: FAMILY MEDICINE CLINIC | Facility: CLINIC | Age: 63
End: 2018-02-13
Payer: COMMERCIAL

## 2018-02-13 VITALS
HEIGHT: 70 IN | BODY MASS INDEX: 28.35 KG/M2 | HEART RATE: 64 BPM | SYSTOLIC BLOOD PRESSURE: 136 MMHG | DIASTOLIC BLOOD PRESSURE: 86 MMHG | WEIGHT: 198 LBS

## 2018-02-13 DIAGNOSIS — Z12.11 SCREEN FOR COLON CANCER: ICD-10-CM

## 2018-02-13 DIAGNOSIS — E78.2 MIXED HYPERLIPIDEMIA: ICD-10-CM

## 2018-02-13 DIAGNOSIS — N18.2 STAGE 2 CHRONIC KIDNEY DISEASE: ICD-10-CM

## 2018-02-13 DIAGNOSIS — I10 ESSENTIAL HYPERTENSION: Primary | ICD-10-CM

## 2018-02-13 PROBLEM — R80.9 PROTEINURIA: Status: ACTIVE | Noted: 2017-08-29

## 2018-02-13 PROBLEM — R71.8 ABNORMAL RBC: Status: ACTIVE | Noted: 2017-08-08

## 2018-02-13 PROBLEM — C61 ADENOCARCINOMA OF PROSTATE (HCC): Status: ACTIVE | Noted: 2017-08-08

## 2018-02-13 PROBLEM — R31.21 ASYMPTOMATIC MICROSCOPIC HEMATURIA: Status: ACTIVE | Noted: 2017-08-29

## 2018-02-13 PROBLEM — L40.9 PSORIASIS: Status: ACTIVE | Noted: 2017-02-08

## 2018-02-13 PROCEDURE — 99214 OFFICE O/P EST MOD 30 MIN: CPT | Performed by: FAMILY MEDICINE

## 2018-02-13 RX ORDER — AMLODIPINE BESYLATE 5 MG/1
5 TABLET ORAL DAILY
Qty: 90 TABLET | Refills: 3 | Status: SHIPPED | OUTPATIENT
Start: 2018-02-13 | End: 2018-07-23 | Stop reason: SINTOL

## 2018-02-13 RX ORDER — LISINOPRIL 20 MG/1
20 TABLET ORAL DAILY
COMMUNITY
Start: 2017-01-03 | End: 2018-12-20 | Stop reason: SDUPTHER

## 2018-02-13 RX ORDER — LOVASTATIN 40 MG/1
1 TABLET ORAL DAILY
COMMUNITY
Start: 2015-03-13 | End: 2019-02-10 | Stop reason: SDUPTHER

## 2018-02-13 NOTE — PROGRESS NOTES
Assessment/Plan:    Hypertension  Patient's blood pressure has become erratic at home because he takes his blood pressure on a regular basis  His blood pressure today is 136/86 and Norvasc 5 mg daily was added to his regimen of lisinopril 20 mg daily  He is also going to cut back on his caffeine consumption  Stage 2 chronic kidney disease  The patient's creatinine went up from 1 4 to 1 5 any does see a nephrologist on a regular basis  Hyperlipidemia  His cholesterol numbers are stable with the , he is currently on lovastatin 40 mg daily  No change in medication  Diagnoses and all orders for this visit:    Essential hypertension  -     amLODIPine (NORVASC) 5 mg tablet; Take 1 tablet (5 mg total) by mouth daily for 90 days  -     Comprehensive metabolic panel; Future  -     Lipid Panel with Direct LDL reflex; Future    Mixed hyperlipidemia  -     Comprehensive metabolic panel; Future  -     Lipid Panel with Direct LDL reflex; Future    Stage 2 chronic kidney disease  -     Comprehensive metabolic panel; Future  -     Lipid Panel with Direct LDL reflex; Future    Screen for colon cancer  -     Occult Bloood,Fecal Immunochemical; Future    Other orders  -     lisinopril (ZESTRIL) 20 mg tablet; Take 1 tablet by mouth daily  -     lovastatin (MEVACOR) 40 MG tablet; Take 1 tablet by mouth daily          Subjective:      Patient ID: Pal Melendez is a 63 y o  male  CC:  Follow up hyperlipidemia, HTN  Review BW results  Pt states he had wrist pain last week  States he has recently taken up drumming and questions if he is doing something wrong  - [facility]    HPI:  This is a 82-year-old male who has come in for his regular 6 month check  He has noticed that his blood pressure has become a radical because he checks his blood pressures at home  He is also drinking a little more caffeine than usual and was advised to cut back on the caffeine    Amlodipine was added at 5 mg daily to try to settle down his blood pressure and control it  His weight is down 9 lb from the previous visit to 198 lb  His blood pressure today is 136/86  Reviewing his labs his CBC is within normal limits, glucose is 85, creatinine went up from 1 4 to 1 5 and he does see a nephrologist   His TSH is within normal range  His cholesterol numbers are stable with the   He sees Urology for his prostate cancer  In his half-way, he has taken up drumming and noticed that he was developing a tendonitis of the right wrist and has backed off on the drumming for a while and the pain has totally resolved  The following portions of the patient's history were reviewed and updated as appropriate: allergies, current medications, past family history, past medical history, past social history, past surgical history and problem list     Review of Systems   Constitutional: Negative  HENT: Negative  Eyes: Negative  Respiratory: Negative  Cardiovascular: Negative  Gastrointestinal: Negative  Endocrine: Negative  Genitourinary: Negative  Musculoskeletal: Negative  Skin: Negative  Allergic/Immunologic: Negative  Neurological: Negative  Hematological: Negative  Psychiatric/Behavioral: Negative  Vitals:    02/13/18 0733   BP: 136/86   BP Location: Left arm   Patient Position: Standing   Cuff Size: Large   Pulse: 64   Weight: 89 8 kg (198 lb)   Height: 5' 10" (1 778 m)   Objective:    Vitals:    02/13/18 0733   BP: 136/86   Pulse: 64        Physical Exam   Constitutional: He is oriented to person, place, and time  He appears well-developed and well-nourished  HENT:   Head: Normocephalic  Right Ear: External ear normal    Left Ear: External ear normal    Mouth/Throat: Oropharynx is clear and moist    Eyes: Conjunctivae and EOM are normal  Pupils are equal, round, and reactive to light  Neck: Normal range of motion  Neck supple     Cardiovascular: Normal rate, regular rhythm and normal heart sounds  Pulmonary/Chest: Effort normal and breath sounds normal    Abdominal: Soft  Bowel sounds are normal    Musculoskeletal: Normal range of motion  Neurological: He is alert and oriented to person, place, and time  Skin: Skin is warm  Psychiatric: He has a normal mood and affect   His behavior is normal  Judgment and thought content normal

## 2018-02-13 NOTE — ASSESSMENT & PLAN NOTE
Patient's blood pressure has become erratic at home because he takes his blood pressure on a regular basis  His blood pressure today is 136/86 and Norvasc 5 mg daily was added to his regimen of lisinopril 20 mg daily  He is also going to cut back on his caffeine consumption

## 2018-02-13 NOTE — ASSESSMENT & PLAN NOTE
His cholesterol numbers are stable with the , he is currently on lovastatin 40 mg daily  No change in medication

## 2018-02-16 ENCOUNTER — PROCEDURE VISIT (OUTPATIENT)
Dept: UROLOGY | Facility: CLINIC | Age: 63
End: 2018-02-16
Payer: COMMERCIAL

## 2018-02-16 VITALS
SYSTOLIC BLOOD PRESSURE: 156 MMHG | HEART RATE: 86 BPM | DIASTOLIC BLOOD PRESSURE: 96 MMHG | HEIGHT: 70 IN | BODY MASS INDEX: 28.4 KG/M2 | WEIGHT: 198.4 LBS

## 2018-02-16 DIAGNOSIS — C61 ADENOCARCINOMA OF PROSTATE (HCC): ICD-10-CM

## 2018-02-16 PROCEDURE — 88342 IMHCHEM/IMCYTCHM 1ST ANTB: CPT | Performed by: UROLOGY

## 2018-02-16 PROCEDURE — 55700 PR BIOPSY OF PROSTATE,NEEDLE/PUNCH: CPT | Performed by: UROLOGY

## 2018-02-16 PROCEDURE — 88344 IMHCHEM/IMCYTCHM EA MLT ANTB: CPT | Performed by: UROLOGY

## 2018-02-16 PROCEDURE — G0416 PROSTATE BIOPSY, ANY MTHD: HCPCS | Performed by: UROLOGY

## 2018-02-16 PROCEDURE — 76942 ECHO GUIDE FOR BIOPSY: CPT | Performed by: UROLOGY

## 2018-02-16 PROCEDURE — 76872 US TRANSRECTAL: CPT | Performed by: UROLOGY

## 2018-02-16 NOTE — PROGRESS NOTES
Jimbo Calderon is a 79-year-old male with a history of a PSA of 4 4  This prompted a transrectal ultrasound-guided biopsy of the prostate which was performed in February of 2017  Two cores out of 12 positive for [place] 3 + 3 = 6/10 prostate cancer involving between 25 and 30% of the cores  He has opted for active surveillance  He returns in follow-up today  A recent PSA was performed to [facility] and is not available for my review at this time  He presents to undergo a surveillance prostate biopsy  Preparation was deemed to be adequate  Prostate Biopsy note: The patient returns to the office today to undergo a transrectal ultrasound-guided biopsy of the prostate secondary to an elevated PSA  Risk and benefits of the procedure were discussed  Informed consent was obtained  The patient's prebiopsy preparation was deemed to be adequate  Antibiotics had been taken as prescribed  If appropriate blood thinners had been placed on hold  The patient completed an enema as prescribed  The patient was placed in the lateral decubitus position  Digital rectal examination was performed revealing a 80 gram prostate  Viscous lidocaine jelly was instilled into the rectum  Transrectal ultrasonography was then performed  The prostate measured 94 65 grams  5 cc of 2% lidocaine were then injected bilaterally between the junction of the base of the prostate and the seminal vesicles  Ultrasound guidance was utilized to place the needle into proper position for the administration of the local anesthetic  A standard 12 core biopsy was then performed  4 cores were taken from the right peripheral zone  2 cores were taken from the right central zone  4 cores were taken from the left peripheral zone  2 cores were taken from the left central zone  Overall the patient tolerated the procedure and there were no complications        My overall impression status post transrectal ultrasound and biopsy the prostate secondary to an elevated PSA, and small volume prostate cancer [place] 6  I have recommended rest and completing antibiotics  Possible postbiopsy sequelae were discussed including hematuria and hematospermia  I've asked that he return in the next one to 2 weeks to review the results of the biopsy

## 2018-02-21 ENCOUNTER — OFFICE VISIT (OUTPATIENT)
Dept: NEPHROLOGY | Facility: CLINIC | Age: 63
End: 2018-02-21
Payer: COMMERCIAL

## 2018-02-21 VITALS
BODY MASS INDEX: 28.75 KG/M2 | WEIGHT: 200.8 LBS | HEIGHT: 70 IN | SYSTOLIC BLOOD PRESSURE: 130 MMHG | DIASTOLIC BLOOD PRESSURE: 62 MMHG

## 2018-02-21 DIAGNOSIS — E83.52 HYPERCALCEMIA: ICD-10-CM

## 2018-02-21 DIAGNOSIS — R80.9 PROTEINURIA, UNSPECIFIED TYPE: ICD-10-CM

## 2018-02-21 DIAGNOSIS — I10 ESSENTIAL HYPERTENSION: ICD-10-CM

## 2018-02-21 DIAGNOSIS — N18.2 STAGE 2 CHRONIC KIDNEY DISEASE: Primary | ICD-10-CM

## 2018-02-21 DIAGNOSIS — R31.21 ASYMPTOMATIC MICROSCOPIC HEMATURIA: ICD-10-CM

## 2018-02-21 PROCEDURE — 99213 OFFICE O/P EST LOW 20 MIN: CPT | Performed by: INTERNAL MEDICINE

## 2018-02-21 NOTE — PATIENT INSTRUCTIONS
1  Chronic kidney disease stage 2 in setting of prostatic adenocarcinoma - eGFR ~70s ml/min per CKD EPI equation  Last sCr 1 58  -continue to avoid nonsteroidals(motrin, aleve, advil, ibuprofen, toradol, naproxen, celebrex, indomethacin and meloxicam)  -very low risk of progression to End stage disease   -renal u/s August 2017 shows echogenic kidneys with b/l renal cysts  The cause of this is unclear  +protein in urine (urine protein:Cr 319mg/g as of 11/2017)    2  HTN - BP well controlled  - continue lisinopril 20mg daily, amlodipine 5mg daily  3  Proteinuria - noted on UA  UpCr 319mg/g, slightly higher than prior  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  Will monitor this on lisinopril as above    4  Prostate cancer - need biopsy shows hugo score 6, cancer diagnosis favored  Appears to be prognostic grade group 1     5  microscopic hematuria - does not appear to be glomerular in origin and UA shows only 0-2 RBCs as of 11/2017  Repeat UA with microscopy    6  Hypercalcemia  - calcium 10 5 as of 2/5/18 and slightly high iCal as of 11/30/017  - check CMP, SPEP with IF as faint M band, alk phos  -PTHrP ok, 1,25 vitamin D level ok, PTH normal, phos normal, ACE negative  -will also check CBC  RTC in 3 months

## 2018-02-21 NOTE — PROGRESS NOTES
NEPHROLOGY OUTPATIENT PROGRESS NOTE   Dewane Osgood 63 y o  male MRN: [id number]  DATE: 2/21/2018  Reason for visit:   Chief Complaint   Patient presents with    Chronic Kidney Disease        There are no Patient Instructions on file for this visit  Rodriguez Funez was seen today for chronic kidney disease  Diagnoses and all orders for this visit:    Stage 2 chronic kidney disease  -     Comprehensive metabolic panel; Future  -     Alkaline phosphatase; Future  -     CBC and differential; Future  -     Protein,Total and Protein Electrophoresis,24 Hour Urine and Immunofixation; Future    Proteinuria, unspecified type  -     UA (URINE) with reflex to Microscopic; Future    Essential hypertension    Asymptomatic microscopic hematuria  -     UA (URINE) with reflex to Microscopic; Future    Hypercalcemia  -     Comprehensive metabolic panel; Future  -     CBC and differential; Future  -     Protein,Total and Protein Electrophoresis,24 Hour Urine and Immunofixation; Future        Assessment/Plan:  1  Chronic kidney disease stage 2 in setting of prostatic adenocarcinoma - eGFR ~70s ml/min per CKD EPI equation  Last sCr 1 58  -continue to avoid nonsteroidals(motrin, aleve, advil, ibuprofen, toradol, naproxen, celebrex, indomethacin and meloxicam)  -very low risk of progression to End stage disease   -renal u/s August 2017 shows echogenic kidneys with b/l renal cysts  The cause of this is unclear  +protein in urine (urine protein:Cr 319mg/g as of 11/2017)    2  HTN - BP well controlled  - continue lisinopril 20mg daily, amlodipine 5mg daily  3  Proteinuria - noted on UA  UpCr 319mg/g, slightly higher than prior  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  Will monitor this on lisinopril as above    4  Prostate cancer - need biopsy shows hugo score 6, cancer diagnosis favored   Appears to be prognostic grade group 1     5  microscopic hematuria - does not appear to be glomerular in origin and UA shows only 0-2 RBCs as of 11/2017  Repeat UA with microscopy    6  Hypercalcemia  - calcium 10 5 as of 2/5/18 and slightly high iCal as of 11/30/017  - check CMP, SPEP with IF as faint M band, alk phos  -PTHrP ok, 1,25 vitamin D level ok, PTH normal, phos normal, ACE negative  -will also check CBC  RTC in 3 months  SUBJECTIVE / INTERVAL HISTORY:  63 y o  male presents in follow up of CKD  Manuel Mendoza denies any recent illness/hospitalizations/medication changes since last office visit  Denies NSAID use  BP at home has been 120-160s range  Sometimes higher in the mornings  Has recently begun amlodipine 5mg daily  S/p recent prostate needle biopsy  Usually has to urinate twice in the morning and then his stream is fine  He has seen slight blood in the urine but it is clearing since the biopsy  Review of Systems   Constitutional: Negative for chills and fever  HENT: Negative for sore throat  Eyes: Negative for visual disturbance  Respiratory: Negative for cough and shortness of breath  Cardiovascular: Negative for chest pain and leg swelling  Gastrointestinal: Negative for abdominal pain, constipation, diarrhea, nausea and vomiting  Endocrine: Negative for polyuria  Genitourinary: Positive for hematuria (as per HPI)  Negative for decreased urine volume, difficulty urinating and dysuria  Musculoskeletal: Negative for back pain and myalgias  Skin: Negative for rash  Neurological: Negative for dizziness, light-headedness and numbness  Psychiatric/Behavioral: Negative for confusion  The patient is not nervous/anxious  OBJECTIVE:  /62 (BP Location: Left arm, Patient Position: Sitting, Cuff Size: Standard)   Ht 5' 10" (1 778 m)   Wt 91 1 kg (200 lb 12 8 oz)   BMI 28 81 kg/m²  Body mass index is 28 81 kg/m²  Physical exam:  Physical Exam   Constitutional: He appears well-developed and well-nourished  No distress     HENT:   Head: Normocephalic and atraumatic  Mouth/Throat: No oropharyngeal exudate  Eyes: Right eye exhibits no discharge  Left eye exhibits no discharge  No scleral icterus  Neck: Normal range of motion  Neck supple  Cardiovascular: Normal rate, regular rhythm and normal heart sounds  Pulmonary/Chest: Effort normal and breath sounds normal  He has no wheezes  He has no rales  Abdominal: Soft  Bowel sounds are normal  He exhibits no distension  There is no tenderness  Musculoskeletal: Normal range of motion  He exhibits no edema  Neurological: He is alert  Skin: Skin is warm and dry  No rash noted  He is not diaphoretic  Psychiatric: He has a normal mood and affect  His behavior is normal    Vitals reviewed  Medications:    Current Outpatient Prescriptions:     amLODIPine (NORVASC) 5 mg tablet, Take 1 tablet (5 mg total) by mouth daily for 90 days, Disp: 90 tablet, Rfl: 3    lisinopril (ZESTRIL) 20 mg tablet, Take 1 tablet by mouth daily, Disp: , Rfl:     lovastatin (MEVACOR) 40 MG tablet, Take 1 tablet by mouth daily, Disp: , Rfl:     Allergies:   Allergies as of 02/21/2018 - Reviewed 02/21/2018   Allergen Reaction Noted    Penicillins  08/29/2017    Fenofibrate Itching and Rash 07/10/2015       The following portions of the patient's history were reviewed and updated as appropriate: past family history, past surgical history and problem list     Laboratory Results:  Lab Results   Component Value Date    CALCIUM 10 4 (H) 02/05/2018     11/30/2017     11/30/2017     11/30/2017     11/30/2017    K 4 1 11/30/2017    K 4 1 11/30/2017    K 4 1 11/30/2017    K 4 1 11/30/2017    CO2 27 11/30/2017    CO2 27 11/30/2017    CO2 27 11/30/2017    CO2 27 11/30/2017     11/30/2017     11/30/2017     11/30/2017     11/30/2017    BUN 26 (H) 02/05/2018    CREATININE 1 58 (H) 02/05/2018        Lab Results   Component Value Date    PTH 49 11/30/2017    PTH 49 11/30/2017    PTH 49 11/30/2017    PTH 49 11/30/2017    CALCIUM 10 4 (H) 02/05/2018    CAION 5 8 (H) 11/30/2017    CAION 5 8 (H) 11/30/2017    CAION 5 8 (H) 11/30/2017    CAION 5 8 (H) 11/30/2017    PHOS 2 9 11/30/2017    PHOS 2 9 11/30/2017    PHOS 2 9 11/30/2017    PHOS 2 9 11/30/2017       Portions of the record may have been created with voice recognition software   Occasional wrong word or "sound a like" substitutions may have occurred due to the inherent limitations of voice recognition software   Read the chart carefully and recognize, using context, where substitutions have occurred

## 2018-02-21 NOTE — LETTER
February 21, 2018     Crispin Gutierrez PA-C  [address] [place] [address] [address]    Patient: Amanda Peña   YOB: 1955   Date of Visit: 2/21/2018       Dear Dr Nadeem Juan:    Thank you for referring Amanda Peña to me for evaluation  Below are my notes for this consultation  If you have questions, please do not hesitate to call me  I look forward to following your patient along with you  Sincerely,        Bishop Robles DO        CC: No Recipients  Bishop Robles DO  2/21/2018  3:15 PM  Sign at close encounter  [address] [facility] NOTE   Amanda Peña 63 y o  male MRN: [id number]  DATE: 2/21/2018  Reason for visit:   Chief Complaint   Patient presents with    Chronic Kidney Disease        There are no Patient Instructions on file for this visit  Diagnoses and all orders for this visit:    Stage 2 chronic kidney disease    Proteinuria, unspecified type    Essential hypertension    Asymptomatic microscopic hematuria        Assessment/Plan:  1  Chronic kidney disease stage 2 in setting of prostatic adenocarcinoma - eGFR ~70s ml/min per CKD EPI equation  Last sCr 1 58  -continue to avoid nonsteroidals(motrin, aleve, advil, ibuprofen, toradol, naproxen, celebrex, indomethacin and meloxicam)  -very low risk of progression to End stage disease   -renal u/s August 2017 shows echogenic kidneys with b/l renal cysts  The cause of this is unclear  +protein in urine (urine protein:Cr 319mg/g as of 11/2017)    2  HTN - BP well controlled,  - continue lisinopril 20mg daily, amlodipine 5mg daily  3  Proteinuria - noted on UA  UpCr 319mg/g, slightly higher than prior  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  Will monitor this on lisinopril as above    4  Prostate cancer - need biopsy shows hugo score 6, cancer diagnosis favored   Appears to be prognostic grade group 1     5  microscopic hematuria - does not appear to be glomerular in origin and UA shows only 0-2 RBCs as of 11/2017  Repeat UA with microscopy    6  Hypercalcemia  - calcium 10 5 as of 2/5/18 and slightly high iCal as of 11/30/017  - check CMP, SPEP with IF as faint M band, alk phos, ACE negative  -PTHrp ok, 1,25 vitamin D level ok, PTH normal, phos normal    RTC in 3 months  SUBJECTIVE / INTERVAL HISTORY:  63 y o  male presents in follow up of CKD  Reji Dye denies any recent illness/hospitalizations/medication changes since last office visit  ?NSAID use    BP at home    Review of Systems   Constitutional: Negative for chills and fever  HENT: Negative for sore throat  Eyes: Negative for visual disturbance  Respiratory: Negative for cough and shortness of breath  Cardiovascular: Negative for chest pain and leg swelling  Gastrointestinal: Negative for abdominal pain, constipation, diarrhea, nausea and vomiting  Endocrine: Negative for polyuria  Genitourinary: Negative for decreased urine volume, difficulty urinating and dysuria  Musculoskeletal: Negative for back pain and myalgias  Skin: Negative for rash  Neurological: Negative for light-headedness and numbness  Psychiatric/Behavioral: Negative for confusion  OBJECTIVE:  /62 (BP Location: Left arm, Patient Position: Sitting, Cuff Size: Standard)   Ht 5' 10" (1 778 m)   Wt 91 1 kg (200 lb 12 8 oz)   BMI 28 81 kg/m²   Body mass index is 28 81 kg/m²  Physical exam:  Physical Exam   Constitutional: He appears well-developed and well-nourished  No distress  HENT:   Head: Normocephalic and atraumatic  Mouth/Throat: No oropharyngeal exudate  Eyes: Right eye exhibits no discharge  Left eye exhibits no discharge  No scleral icterus  Neck: Normal range of motion  Neck supple  Cardiovascular: Normal rate, regular rhythm and normal heart sounds  Pulmonary/Chest: Effort normal and breath sounds normal  He has no wheezes  He has no rales  Abdominal: Soft  Bowel sounds are normal  He exhibits no distension  There is no tenderness  Musculoskeletal: Normal range of motion  He exhibits no edema  Neurological: He is alert  Skin: Skin is warm and dry  No rash noted  He is not diaphoretic  Psychiatric: He has a normal mood and affect  His behavior is normal    Vitals reviewed  Medications:    Current Outpatient Prescriptions:     amLODIPine (NORVASC) 5 mg tablet, Take 1 tablet (5 mg total) by mouth daily for 90 days, Disp: 90 tablet, Rfl: 3    lisinopril (ZESTRIL) 20 mg tablet, Take 1 tablet by mouth daily, Disp: , Rfl:     lovastatin (MEVACOR) 40 MG tablet, Take 1 tablet by mouth daily, Disp: , Rfl:     Allergies:   Allergies as of 02/21/2018 - Reviewed 02/21/2018   Allergen Reaction Noted    Penicillins  08/29/2017    Fenofibrate Itching and Rash 07/10/2015       The following portions of the patient's history were reviewed and updated as appropriate: past family history, past surgical history and problem list     Laboratory Results:  Lab Results   Component Value Date    CALCIUM 10 4 (H) 02/05/2018     11/30/2017     11/30/2017     11/30/2017     11/30/2017    K 4 1 11/30/2017    K 4 1 11/30/2017    K 4 1 11/30/2017    K 4 1 11/30/2017    CO2 27 11/30/2017    CO2 27 11/30/2017    CO2 27 11/30/2017    CO2 27 11/30/2017     11/30/2017     11/30/2017     11/30/2017     11/30/2017    BUN 26 (H) 02/05/2018    CREATININE 1 58 (H) 02/05/2018        Lab Results   Component Value Date    PTH 49 11/30/2017    PTH 49 11/30/2017    PTH 49 11/30/2017    PTH 49 11/30/2017    CALCIUM 10 4 (H) 02/05/2018    CAION 5 8 (H) 11/30/2017    CAION 5 8 (H) 11/30/2017    CAION 5 8 (H) 11/30/2017    CAION 5 8 (H) 11/30/2017    PHOS 2 9 11/30/2017    PHOS 2 9 11/30/2017    PHOS 2 9 11/30/2017    PHOS 2 9 11/30/2017       Portions of the record may have been created with voice recognition software   Occasional wrong word or "sound a like" substitutions may have occurred due to the inherent limitations of voice recognition software   Read the chart carefully and recognize, using context, where substitutions have occurred

## 2018-03-02 ENCOUNTER — OFFICE VISIT (OUTPATIENT)
Dept: UROLOGY | Facility: CLINIC | Age: 63
End: 2018-03-02
Payer: COMMERCIAL

## 2018-03-02 VITALS
DIASTOLIC BLOOD PRESSURE: 90 MMHG | WEIGHT: 199 LBS | SYSTOLIC BLOOD PRESSURE: 150 MMHG | HEART RATE: 82 BPM | BODY MASS INDEX: 28.49 KG/M2 | HEIGHT: 70 IN

## 2018-03-02 DIAGNOSIS — C61 ADENOCARCINOMA OF PROSTATE (HCC): Primary | ICD-10-CM

## 2018-03-02 PROCEDURE — 99214 OFFICE O/P EST MOD 30 MIN: CPT | Performed by: UROLOGY

## 2018-03-02 NOTE — PROGRESS NOTES
3/2/2018    Ed Burroughs  1955  [id number]        Assessment  Very low risk, small volume [place] 6 prostate cancer      Discussion  The patient I had a lengthy discussion in the office today concerning his active surveillance for small volume Deanna 6 prostate cancer  I recommend continuing active surveillance as 3 cores out of 12 reveal 5-30 percent [place] 3 + 3 = 6/10 prostate cancer  This is minimally changed compared to his initial biopsy in February of 2017 when 2/12 cores were positive for Deanna 3 + 3 = 6/10 prostate cancer involving between 25 in 30 percent of the cores  The patient is amenable with this plan  I recommend follow-up in 4 months with his next PSA  In early 2019 we discussed possibly performing his 3rd biopsy versus continued observation versus a multiparametric MRI of the prostate  History of Present Illness  63 y o  male with a history of small volume Deanna 6 prostate cancer  In 2000 February 17, 2012 cores were positive for [place] 6 disease between 25 in 30 percent of the core  He recently returned undergo repeat prostate biopsy  PSA was 4 4 at that time  Pathology revealed 3 of 12 cores positive for Deanna 6 cancer between 5 in 30 percent of the cores  He denies any post biopsy sequelae  He returns to the office today to discuss her review pathology            AUA Symptom Score      Review of Systems  Review of Systems      Past Medical History  Past Medical History:   Diagnosis Date    Adenocarcinoma of prostate ([address] )     Last assessed 08/08/17    Hypertension        Past Social History  Past Surgical History:   Procedure Laterality Date    PROSTATE BIOPSY  02/16/2018    TONSILLECTOMY         Past Family History  Family History   Problem Relation Age of Onset    Arthritis Mother     Hyperlipidemia Mother     Hypertension Mother     Diabetes Son        Past Social history  Social History     Social History    Marital status: /Civil Union Spouse name: N/A    Number of children: N/A    Years of education: N/A     Occupational History    retired      Social History Main Topics    Smoking status: Never Smoker    Smokeless tobacco: Never Used    Alcohol use Yes      Comment: socially    Drug use: No    Sexual activity: Not on file     Other Topics Concern    Not on file     Social History Narrative    No narrative on file       Current Medications  Current Outpatient Prescriptions   Medication Sig Dispense Refill    amLODIPine (NORVASC) 5 mg tablet Take 1 tablet (5 mg total) by mouth daily for 90 days 90 tablet 3    lisinopril (ZESTRIL) 20 mg tablet Take 1 tablet by mouth daily      lovastatin (MEVACOR) 40 MG tablet Take 1 tablet by mouth daily       No current facility-administered medications for this visit  Allergies  Allergies   Allergen Reactions    Penicillins     Fenofibrate Itching and Rash       Past Medical History, Social History, Family History, medications and allergies were reviewed      Vitals  Vitals:    03/02/18 1129   BP: 150/90   Pulse: 82   Weight: 90 3 kg (199 lb)   Height: 5' 10" (1 778 m)       Physical Exam  Physical Exam        Results  No results found for: PSA  Lab Results   Component Value Date    CALCIUM 10 4 (H) 02/05/2018     11/30/2017     11/30/2017     11/30/2017     11/30/2017    K 4 1 11/30/2017    K 4 1 11/30/2017    K 4 1 11/30/2017    K 4 1 11/30/2017    CO2 27 11/30/2017    CO2 27 11/30/2017    CO2 27 11/30/2017    CO2 27 11/30/2017     11/30/2017     11/30/2017     11/30/2017     11/30/2017    BUN 26 (H) 02/05/2018    CREATININE 1 58 (H) 02/05/2018     Lab Results   Component Value Date    WBC NONE SEEN 11/30/2017    WBC NONE SEEN 11/30/2017    WBC NONE SEEN 11/30/2017    WBC NONE SEEN 11/30/2017    HGB 15 8 02/05/2018    HCT 49 2 02/05/2018    MCV 81 2 02/05/2018     02/05/2018         Office Urine Dip  No results found for this or any previous visit (from the past 1 hour(s))  ]      Total visit time was 25 minutes of which over 50% was spent on counseling

## 2018-04-03 LAB
ALBUMIN SERPL-MCNC: 4.4 G/DL (ref 3.6–5.1)
ALBUMIN/GLOB SERPL: 1.6 (CALC) (ref 1–2.5)
ALP SERPL-CCNC: 59 U/L (ref 40–115)
ALT SERPL-CCNC: 19 U/L (ref 9–46)
APPEARANCE UR: CLEAR
AST SERPL-CCNC: 16 U/L (ref 10–35)
BACTERIA UR QL AUTO: ABNORMAL /HPF
BASOPHILS # BLD AUTO: 43 CELLS/UL (ref 0–200)
BASOPHILS NFR BLD AUTO: 0.5 %
BILIRUB SERPL-MCNC: 0.6 MG/DL (ref 0.2–1.2)
BILIRUB UR QL STRIP: NEGATIVE
BUN SERPL-MCNC: 21 MG/DL (ref 7–25)
BUN/CREAT SERPL: 14 (CALC) (ref 6–22)
CALCIUM SERPL-MCNC: 10.1 MG/DL (ref 8.6–10.3)
CHLORIDE SERPL-SCNC: 103 MMOL/L (ref 98–110)
CO2 SERPL-SCNC: 30 MMOL/L (ref 20–31)
COLOR UR: YELLOW
CREAT SERPL-MCNC: 1.54 MG/DL (ref 0.7–1.25)
EOSINOPHIL # BLD AUTO: 361 CELLS/UL (ref 15–500)
EOSINOPHIL NFR BLD AUTO: 4.2 %
ERYTHROCYTE [DISTWIDTH] IN BLOOD BY AUTOMATED COUNT: 13.8 % (ref 11–15)
GLOBULIN SER CALC-MCNC: 2.8 G/DL (CALC) (ref 1.9–3.7)
GLUCOSE SERPL-MCNC: 71 MG/DL (ref 65–99)
GLUCOSE UR QL STRIP: NEGATIVE
HCT VFR BLD AUTO: 49.8 % (ref 38.5–50)
HGB BLD-MCNC: 16 G/DL (ref 13.2–17.1)
HGB UR QL STRIP: ABNORMAL
HYALINE CASTS #/AREA URNS LPF: ABNORMAL /LPF
KETONES UR QL STRIP: NEGATIVE
LEUKOCYTE ESTERASE UR QL STRIP: NEGATIVE
LYMPHOCYTES # BLD AUTO: 1944 CELLS/UL (ref 850–3900)
LYMPHOCYTES NFR BLD AUTO: 22.6 %
MCH RBC QN AUTO: 26.3 PG (ref 27–33)
MCHC RBC AUTO-ENTMCNC: 32.1 G/DL (ref 32–36)
MCV RBC AUTO: 81.8 FL (ref 80–100)
MONOCYTES # BLD AUTO: 740 CELLS/UL (ref 200–950)
MONOCYTES NFR BLD AUTO: 8.6 %
NEUTROPHILS # BLD AUTO: 5513 CELLS/UL (ref 1500–7800)
NEUTROPHILS NFR BLD AUTO: 64.1 %
NITRITE UR QL STRIP: NEGATIVE
PH UR STRIP: 5.5 [PH] (ref 5–8)
PLATELET # BLD AUTO: 201 THOUSAND/UL (ref 140–400)
PMV BLD REES-ECKER: 10.8 FL (ref 7.5–12.5)
POTASSIUM SERPL-SCNC: 4.5 MMOL/L (ref 3.5–5.3)
PROT SERPL-MCNC: 7.2 G/DL (ref 6.1–8.1)
PROT UR QL STRIP: ABNORMAL
RBC # BLD AUTO: 6.09 MILLION/UL (ref 4.2–5.8)
RBC #/AREA URNS HPF: ABNORMAL /HPF
SL AMB EGFR AFRICAN AMERICAN: 56 ML/MIN/1.73M2
SL AMB EGFR NON AFRICAN AMERICAN: 48 ML/MIN/1.73M2
SODIUM SERPL-SCNC: 140 MMOL/L (ref 135–146)
SP GR UR STRIP: 1.02 (ref 1–1.03)
SQUAMOUS #/AREA URNS HPF: ABNORMAL /HPF
WBC # BLD AUTO: 8.6 THOUSAND/UL (ref 3.8–10.8)
WBC #/AREA URNS HPF: ABNORMAL /HPF

## 2018-06-26 LAB — PSA SERPL-MCNC: 4.2 NG/ML

## 2018-07-02 NOTE — PROGRESS NOTES
7/5/2018      Chief Complaint   Patient presents with    Prostate Cancer     PSA = 4 2 (6/25/18)       Assessment and Plan    63 y o  male managed by Dr Hill Man    1  Small volume Deanna 6 prostate cancer on active surveillance   - PSA 4 2 (6/25/18) and stable   - FU 3 months with PSA and if stable another 3 months   - in early 2019 we discussed possibly performing his 3rd biopsy versus continued observation versus a multiparametric MRI of the prostate, will determine based off PSA trend       History of Present Illness  Vandana Deluca is a 63 y o  male here for follow up evaluation of small volume Deanna 6 prostate cancer  Patient continues on active surveillance  His initial biopsy was performed 2/17/2012 and was positive for Deanna 6 involving 25 in 30 percent of the cores  He underwent a 2nd biopsy 2/16/2018 for PSA of 4 4  This revealed revealed 3 of 12 cores positive for [place] 6 cancer between 5 in 30 percent of the cores  To his visit today is stable at 4 2  Lower urinary tract symptoms and AUA symptom score as below  He is doing well today with no urinary complaints  His PSA obtained prior      Review of Systems   Constitutional: Negative for activity change, chills and fever  Gastrointestinal: Negative for abdominal distention and abdominal pain  Musculoskeletal: Negative for back pain and gait problem  Psychiatric/Behavioral: Negative for behavioral problems and confusion  Urinary Incontinence Screening      Most Recent Value   Urinary Incontinence   Urinary Incontinence? No   Incomplete emptying? No   Urinary frequency? No   Urinary urgency? No   Urinary hesitancy? No   Dysuria (painful difficult urination)? No   Nocturia (waking up to use the bathroom)? Yes [2 times per night]   Straining (having to push to go)? No   Weak stream?  No   Intermittent stream?  Yes [only 1 void in the am]   Post void dribbling?   No        AUA SYMPTOM SCORE      Most Recent Value   AUA SYMPTOM SCORE   How often have you had a sensation of not emptying your bladder completely after you finished urinating? 1   How often have you had to urinate again less than two hours after you finished urinating? 1   How often have you found you stopped and started again several times when you urinate? 1   How often have you found it difficult to postpone urination? 0   How often have you had a weak urinary stream?  0   How often have you had to push or strain to begin urination? 1   How many times did you most typically get up to urinate from the time you went to bed at night until the time you got up in the morning?   2   Quality of Life: If you were to spend the rest of your life with your urinary condition just the way it is now, how would you feel about that?  2   AUA SYMPTOM SCORE  6          Past Medical History  Past Medical History:   Diagnosis Date    Adenocarcinoma of prostate ([facility] [address] )     Last assessed 08/08/17    Hypertension        Past Social History  Past Surgical History:   Procedure Laterality Date    PROSTATE BIOPSY  02/16/2018    TONSILLECTOMY       History   Smoking Status    Never Smoker   Smokeless Tobacco    Never Used       Past Family History  Family History   Problem Relation Age of Onset    Arthritis Mother     Hyperlipidemia Mother     Hypertension Mother     Diabetes Son        Past Social history  Social History     Social History    Marital status: /Civil Union     Spouse name: N/A    Number of children: N/A    Years of education: N/A     Occupational History    retired      Social History Main Topics    Smoking status: Never Smoker    Smokeless tobacco: Never Used    Alcohol use Yes      Comment: socially    Drug use: No    Sexual activity: Not on file     Other Topics Concern    Not on file     Social History Narrative    No narrative on file       Current Medications  Current Outpatient Prescriptions   Medication Sig Dispense Refill    ENSTILAR 0 005-0 064 % FOAM APPLY TO SKIN ONCE DAILY  6    lisinopril (ZESTRIL) 20 mg tablet Take 1 tablet by mouth daily      lovastatin (MEVACOR) 40 MG tablet Take 1 tablet by mouth daily      amLODIPine (NORVASC) 5 mg tablet Take 1 tablet (5 mg total) by mouth daily for 90 days 90 tablet 3     No current facility-administered medications for this visit  Allergies  Allergies   Allergen Reactions    Penicillins     Fenofibrate Itching and Rash         The following portions of the patient's history were reviewed and updated as appropriate: allergies, current medications, past medical history, past social history, past surgical history and problem list       Vitals  Vitals:    07/05/18 0929   BP: 138/82   Pulse: 76   Weight: 88 4 kg (194 lb 12 8 oz)   Height: 5' 10" (1 778 m)       Physical Exam  Constitutional   General appearance: Patient is seated and in no acute distress, well appearing and well nourished  Head and Face   Head and face: Normal     Eyes   Conjunctiva and lids: No erythema, swelling or discharge  Ears, Nose, Mouth, and Throat   Hearing: Normal     Pulmonary   Respiratory effort: No increased work of breathing or signs of respiratory distress  Cardiovascular   Examination of extremities for edema and/or varicosities: Normal     Abdomen   Abdomen: Non-tender, no masses  Musculoskeletal   Gait and station: Normal     Skin   Skin and subcutaneous tissue: Warm, dry, and intact  No visible lesions or rashes  Psychiatric   Judgment and insight: Normal  Recent and remote memory:  Normal  Mood and affect: Normal      Results  No results found for this or any previous visit (from the past 1 hour(s))  ]  No results found for: PSA  Lab Results   Component Value Date    CALCIUM 10 1 04/02/2018     11/30/2017     11/30/2017     11/30/2017     11/30/2017    K 4 1 11/30/2017    K 4 1 11/30/2017    K 4 1 11/30/2017    K 4 1 11/30/2017    CO2 27 11/30/2017    CO2 27 11/30/2017 CO2 27 11/30/2017    CO2 27 11/30/2017     11/30/2017     11/30/2017     11/30/2017     11/30/2017    BUN 21 04/02/2018    CREATININE 1 54 (H) 04/02/2018     Lab Results   Component Value Date    WBC NONE SEEN 11/30/2017    WBC NONE SEEN 11/30/2017    WBC NONE SEEN 11/30/2017    WBC NONE SEEN 11/30/2017    HGB 16 0 04/02/2018    HCT 49 8 04/02/2018    MCV 81 8 04/02/2018     04/02/2018       Orders  Orders Placed This Encounter   Procedures    PSA Total, Diagnostic     Standing Status:   Future     Standing Expiration Date:   7/5/2019    PSA Total, Diagnostic     Standing Status:   Future     Standing Expiration Date:   7/5/2019

## 2018-07-05 ENCOUNTER — OFFICE VISIT (OUTPATIENT)
Dept: UROLOGY | Facility: CLINIC | Age: 63
End: 2018-07-05
Payer: COMMERCIAL

## 2018-07-05 VITALS
DIASTOLIC BLOOD PRESSURE: 82 MMHG | BODY MASS INDEX: 27.89 KG/M2 | HEART RATE: 76 BPM | HEIGHT: 70 IN | WEIGHT: 194.8 LBS | SYSTOLIC BLOOD PRESSURE: 138 MMHG

## 2018-07-05 DIAGNOSIS — C61 ADENOCARCINOMA OF PROSTATE (HCC): Primary | ICD-10-CM

## 2018-07-05 PROCEDURE — 99213 OFFICE O/P EST LOW 20 MIN: CPT | Performed by: PHYSICIAN ASSISTANT

## 2018-07-05 RX ORDER — CALCIPOTRIENE AND BETAMETHASONE DIPROPIONATE 50; .5 UG/G; MG/G
AEROSOL, FOAM TOPICAL
Refills: 6 | COMMUNITY
Start: 2018-05-03 | End: 2022-01-06 | Stop reason: ALTCHOICE

## 2018-07-18 ENCOUNTER — TRANSCRIBE ORDERS (OUTPATIENT)
Dept: FAMILY MEDICINE CLINIC | Facility: CLINIC | Age: 63
End: 2018-07-18

## 2018-07-18 DIAGNOSIS — N18.2 CHRONIC KIDNEY DISEASE, STAGE II (MILD): Primary | ICD-10-CM

## 2018-07-23 ENCOUNTER — OFFICE VISIT (OUTPATIENT)
Dept: NEPHROLOGY | Facility: CLINIC | Age: 63
End: 2018-07-23
Payer: COMMERCIAL

## 2018-07-23 VITALS
DIASTOLIC BLOOD PRESSURE: 70 MMHG | BODY MASS INDEX: 28.15 KG/M2 | WEIGHT: 196.6 LBS | SYSTOLIC BLOOD PRESSURE: 146 MMHG | HEIGHT: 70 IN

## 2018-07-23 DIAGNOSIS — R80.9 PROTEINURIA, UNSPECIFIED TYPE: ICD-10-CM

## 2018-07-23 DIAGNOSIS — E83.52 HYPERCALCEMIA: ICD-10-CM

## 2018-07-23 DIAGNOSIS — I10 ESSENTIAL HYPERTENSION: ICD-10-CM

## 2018-07-23 DIAGNOSIS — N18.2 CHRONIC KIDNEY DISEASE, STAGE II (MILD): Primary | ICD-10-CM

## 2018-07-23 DIAGNOSIS — N18.2 STAGE 2 CHRONIC KIDNEY DISEASE: ICD-10-CM

## 2018-07-23 PROCEDURE — 99213 OFFICE O/P EST LOW 20 MIN: CPT | Performed by: INTERNAL MEDICINE

## 2018-07-23 NOTE — PROGRESS NOTES
NEPHROLOGY OUTPATIENT PROGRESS NOTE   Rolm Runner 63 y o  male MRN: [id number]  DATE: 7/23/2018  Reason for visit:   Chief Complaint   Patient presents with    Follow-up        Patient Instructions     1  Chronic kidney disease stage 2 in setting of prostatic adenocarcinoma - eGFR ~70s ml/min per CKD EPI equation  Last sCr 1 54 (stable)  -continue to avoid nonsteroidals(motrin, aleve, advil, ibuprofen, toradol, naproxen, celebrex, indomethacin and meloxicam)  -very low risk of progression to End stage disease   -renal u/s August 2017 shows echogenic kidneys with b/l renal cysts  The cause of this is unclear  +protein in urine (urine protein:Cr 319mg/g as of 11/2017)  -repeat BMP     2  HTN - BP well controlled at home but a bit elevated in office  Continue low salt diet  - continue lisinopril 20mg daily  No longer on amlodipine 5mg daily    - Bring BP cuff with you to next office visit   -Call office with BP readings  Call end of the week  Check twice daily but do not check first thing in the morning as BP tends to run higher in everyone then  BP log provided      3  Proteinuria - noted on UA  UpCr 319mg/g, slightly higher than prior  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  Repeat UpCr       4  Prostate cancer - Undergoing active surveillance by urology  Last PSA June 2018 4 2     5  microscopic hematuria - does not appear to be glomerular in origin and UA shows only 0-2 RBCs as of 11/2017  Repeat UA with microscopy showed 2+ blood, 1+ protein with only 0-2 RBCs  Will check CK level  May have underlying TBM vs IgAN but without significant proteinuria, no need for renal biopsy       6  Hypercalcemia  - resolved, normal range from last labs performed 4/2/18        Duc Ayon was seen today for follow-up  Diagnoses and all orders for this visit:    Chronic kidney disease, stage II (mild)  -     Ambulatory referral to Nephrology  -     Basic metabolic panel;  Future    Essential hypertension    Stage 2 chronic kidney disease    Proteinuria, unspecified type  -     Protein / creatinine ratio, urine; Future    Hypercalcemia        Assessment/Plan:  Last seen by me 2/21/18    1  Chronic kidney disease stage 2 in setting of prostatic adenocarcinoma - eGFR ~70s ml/min per CKD EPI equation  Last sCr 1 54 (stable)  -continue to avoid nonsteroidals(motrin, aleve, advil, ibuprofen, toradol, naproxen, celebrex, indomethacin and meloxicam)  -very low risk of progression to End stage disease   -renal u/s August 2017 shows echogenic kidneys with b/l renal cysts  The cause of this is unclear  +protein in urine (urine protein:Cr 319mg/g as of 11/2017)  -repeat BMP     2  HTN - BP well controlled at home but a bit elevated in office  Continue low salt diet  - continue lisinopril 20mg daily  No longer on amlodipine 5mg daily    - Bring BP cuff with you to next office visit   -Call office with BP readings  Call end of the week  Check twice daily but do not check first thing in the morning as BP tends to run higher in everyone then  BP log provided      3  Proteinuria - noted on UA  UpCr 319mg/g, slightly higher than prior  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  Repeat UpCr       4  Prostate cancer - Undergoing active surveillance by urology  Last PSA June 2018 4 2     5  microscopic hematuria - does not appear to be glomerular in origin and UA shows only 0-2 RBCs as of 11/2017  Repeat UA with microscopy showed 2+ blood, 1+ protein with only 0-2 RBCs  Will check CK level  May have underlying TBM vs IgAN but without significant proteinuria, no need for renal biopsy       6  Hypercalcemia  - resolved, normal range from last labs performed 4/2/18      SUBJECTIVE / INTERVAL HISTORY:  63 y o  male presents in follow up of CKD  Rosa Elena Level denies any recent illness/hospitalizations  He is no longer on amlodipine due to rash       He does have a slow stream in the mornings but otherwise, no complaints  He feels well  Denies NSAID use  SBP at home has been 120s-140s  Review of Systems   Constitutional: Negative for chills and fever  HENT: Negative for sore throat  Eyes: Negative for visual disturbance  Respiratory: Negative for cough and shortness of breath  Cardiovascular: Negative for chest pain and leg swelling  Gastrointestinal: Negative for abdominal pain, constipation, diarrhea, nausea and vomiting  Endocrine: Negative for polyuria  Genitourinary: Positive for difficulty urinating (Slow urinary stream)  Negative for decreased urine volume and dysuria  Musculoskeletal: Negative for back pain and myalgias  Skin: Negative for rash  Neurological: Negative for dizziness, light-headedness and numbness  Psychiatric/Behavioral: Negative for confusion  OBJECTIVE:  /74 (BP Location: Right arm, Patient Position: Sitting, Cuff Size: Standard)   Ht 5' 10" (1 778 m)   Wt 89 2 kg (196 lb 9 6 oz)   BMI 28 21 kg/m²  Body mass index is 28 21 kg/m²  Physical exam:  Physical Exam   Constitutional: He appears well-developed and well-nourished  No distress  HENT:   Head: Normocephalic and atraumatic  Mouth/Throat: No oropharyngeal exudate  Eyes: Right eye exhibits no discharge  Left eye exhibits no discharge  No scleral icterus  Neck: Neck supple  Cardiovascular: Normal rate, regular rhythm and normal heart sounds  Pulmonary/Chest: Effort normal and breath sounds normal  He has no wheezes  He has no rales  Abdominal: Soft  Bowel sounds are normal  He exhibits no distension  There is no tenderness  Musculoskeletal: Normal range of motion  He exhibits no edema  Neurological: He is alert  awake   Skin: Skin is warm and dry  No rash noted  He is not diaphoretic  Psychiatric: He has a normal mood and affect  His behavior is normal    Vitals reviewed        Medications:    Current Outpatient Prescriptions:     amLODIPine (NORVASC) 5 mg tablet, Take 1 tablet (5 mg total) by mouth daily for 90 days, Disp: 90 tablet, Rfl: 3    ENSTILAR 0 005-0 064 % FOAM, APPLY TO SKIN ONCE DAILY  , Disp: , Rfl: 6    lisinopril (ZESTRIL) 20 mg tablet, Take 1 tablet by mouth daily, Disp: , Rfl:     lovastatin (MEVACOR) 40 MG tablet, Take 1 tablet by mouth daily, Disp: , Rfl:     Allergies: Allergies as of 07/23/2018 - Reviewed 07/05/2018   Allergen Reaction Noted    Penicillins  08/29/2017    Fenofibrate Itching and Rash 07/10/2015       The following portions of the patient's history were reviewed and updated as appropriate: past family history, past surgical history and problem list     Laboratory Results:  Lab Results   Component Value Date    CALCIUM 10 1 04/02/2018     11/30/2017     11/30/2017     11/30/2017     11/30/2017    K 4 1 11/30/2017    K 4 1 11/30/2017    K 4 1 11/30/2017    K 4 1 11/30/2017    CO2 27 11/30/2017    CO2 27 11/30/2017    CO2 27 11/30/2017    CO2 27 11/30/2017     11/30/2017     11/30/2017     11/30/2017     11/30/2017    BUN 21 04/02/2018    CREATININE 1 54 (H) 04/02/2018        Lab Results   Component Value Date    PTH 49 11/30/2017    PTH 49 11/30/2017    PTH 49 11/30/2017    PTH 49 11/30/2017    CALCIUM 10 1 04/02/2018    CAION 5 8 (H) 11/30/2017    CAION 5 8 (H) 11/30/2017    CAION 5 8 (H) 11/30/2017    CAION 5 8 (H) 11/30/2017    PHOS 2 9 11/30/2017    PHOS 2 9 11/30/2017    PHOS 2 9 11/30/2017    PHOS 2 9 11/30/2017       Portions of the record may have been created with voice recognition software   Occasional wrong word or "sound a like" substitutions may have occurred due to the inherent limitations of voice recognition software   Read the chart carefully and recognize, using context, where substitutions have occurred

## 2018-07-23 NOTE — PATIENT INSTRUCTIONS
1  Chronic kidney disease stage 2 in setting of prostatic adenocarcinoma - eGFR ~70s ml/min per CKD EPI equation  Last sCr 1 54 (stable)  -continue to avoid nonsteroidals(motrin, aleve, advil, ibuprofen, toradol, naproxen, celebrex, indomethacin and meloxicam)  -very low risk of progression to End stage disease   -renal u/s August 2017 shows echogenic kidneys with b/l renal cysts  The cause of this is unclear  +protein in urine (urine protein:Cr 319mg/g as of 11/2017)  -repeat BMP     2  HTN - BP well controlled at home but a bit elevated in office  Continue low salt diet  - continue lisinopril 20mg daily  No longer on amlodipine 5mg daily    - Bring BP cuff with you to next office visit   -Call office with BP readings  Call end of the week  Check twice daily but do not check first thing in the morning as BP tends to run higher in everyone then  BP log provided      3  Proteinuria - noted on UA  UpCr 319mg/g, slightly higher than prior  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

## 2018-07-23 NOTE — LETTER
July 23, 2018     [address] [address]    Patient: Zenon Matos   YOB: 1955   Date of Visit: 7/23/2018       Dear Dr Homero Lozano:    Thank you for referring Zenon Matos to me for evaluation  Below are my notes for this consultation  If you have questions, please do not hesitate to call me  I look forward to following your patient along with you  Sincerely,        Liset Flores DO        CC: No Recipients  Liset Flores DO  7/23/2018  9:38 AM  Sign at close encounter  [facility] NOTE   Zenon Matos 63 y o  male MRN: [id number]  DATE: 7/23/2018  Reason for visit:   Chief Complaint   Patient presents with    Follow-up        Patient Instructions     1  Chronic kidney disease stage 2 in setting of prostatic adenocarcinoma - eGFR ~70s ml/min per CKD EPI equation  Last sCr 1 54 (stable)  -continue to avoid nonsteroidals(motrin, aleve, advil, ibuprofen, toradol, naproxen, celebrex, indomethacin and meloxicam)  -very low risk of progression to End stage disease   -renal u/s August 2017 shows echogenic kidneys with b/l renal cysts  The cause of this is unclear  +protein in urine (urine protein:Cr 319mg/g as of 11/2017)  -repeat BMP     2  HTN - BP well controlled at home but a bit elevated in office  Continue low salt diet  - continue lisinopril 20mg daily  No longer on amlodipine 5mg daily    - Bring BP cuff with you to next office visit   -Call office with BP readings  Call end of the week  Check twice daily but do not check first thing in the morning as BP tends to run higher in everyone then  BP log provided      3  Proteinuria - noted on UA  UpCr 319mg/g, slightly higher than prior  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  Repeat UpCr       4  Prostate cancer - Undergoing active surveillance by urology   Last PSA June 2018 4 2     5  microscopic hematuria - does not appear to be glomerular in origin and UA shows only 0-2 RBCs as of 11/2017  Repeat UA with microscopy showed 2+ blood, 1+ protein with only 0-2 RBCs  Will check CK level  May have underlying TBM vs IgAN but without significant proteinuria, no need for renal biopsy       6  Hypercalcemia  - resolved, normal range from last labs performed 4/2/18        Sandi Edgar was seen today for follow-up  Diagnoses and all orders for this visit:    Chronic kidney disease, stage II (mild)  -     Ambulatory referral to Nephrology  -     Basic metabolic panel; Future    Essential hypertension    Stage 2 chronic kidney disease    Proteinuria, unspecified type  -     Protein / creatinine ratio, urine; Future    Hypercalcemia        Assessment/Plan:  Last seen by me 2/21/18    1  Chronic kidney disease stage 2 in setting of prostatic adenocarcinoma - eGFR ~70s ml/min per CKD EPI equation  Last sCr 1 54 (stable)  -continue to avoid nonsteroidals(motrin, aleve, advil, ibuprofen, toradol, naproxen, celebrex, indomethacin and meloxicam)  -very low risk of progression to End stage disease   -renal u/s August 2017 shows echogenic kidneys with b/l renal cysts  The cause of this is unclear  +protein in urine (urine protein:Cr 319mg/g as of 11/2017)  -repeat BMP     2  HTN - BP well controlled at home but a bit elevated in office  Continue low salt diet  - continue lisinopril 20mg daily  No longer on amlodipine 5mg daily    - Bring BP cuff with you to next office visit   -Call office with BP readings  Call end of the week  Check twice daily but do not check first thing in the morning as BP tends to run higher in everyone then  BP log provided      3  Proteinuria - noted on UA  UpCr 319mg/g, slightly higher than prior  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  Repeat UpCr       4  Prostate cancer - Undergoing active surveillance by urology   Last PSA June 2018 4 2     5  microscopic hematuria - does not appear to be glomerular in origin and UA shows only 0-2 RBCs as of 11/2017  Repeat UA with microscopy showed 2+ blood, 1+ protein with only 0-2 RBCs  Will check CK level  May have underlying TBM vs IgAN but without significant proteinuria, no need for renal biopsy       6  Hypercalcemia  -  resolved, normal range from last labs performed 4/2/18      SUBJECTIVE / INTERVAL HISTORY:  63 y o  male presents in follow up of CKD  Zahraa Brenner denies any recent illness/hospitalizations  He is no longer on amlodipine due to rash  He does have a slow stream in the mornings but otherwise, no complaints  He feels well  Denies NSAID use  SBP at home has been 120s-140s  Review of Systems   Constitutional: Negative for chills and fever  HENT: Negative for sore throat  Eyes: Negative for visual disturbance  Respiratory: Negative for cough and shortness of breath  Cardiovascular: Negative for chest pain and leg swelling  Gastrointestinal: Negative for abdominal pain, constipation, diarrhea, nausea and vomiting  Endocrine: Negative for polyuria  Genitourinary: Positive for difficulty urinating (Slow urinary stream)  Negative for decreased urine volume and dysuria  Musculoskeletal: Negative for back pain and myalgias  Skin: Negative for rash  Neurological: Negative for dizziness, light-headedness and numbness  Psychiatric/Behavioral: Negative for confusion  OBJECTIVE:  /74 (BP Location: Right arm, Patient Position: Sitting, Cuff Size: Standard)   Ht 5' 10" (1 778 m)   Wt 89 2 kg (196 lb 9 6 oz)   BMI 28 21 kg/m²   Body mass index is 28 21 kg/m²  Physical exam:  Physical Exam   Constitutional: He appears well-developed and well-nourished  No distress  HENT:   Head: Normocephalic and atraumatic  Mouth/Throat: No oropharyngeal exudate  Eyes: Right eye exhibits no discharge  Left eye exhibits no discharge  No scleral icterus  Neck: Neck supple  Cardiovascular: Normal rate, regular rhythm and normal heart sounds  Pulmonary/Chest: Effort normal and breath sounds normal  He has no wheezes  He has no rales  Abdominal: Soft  Bowel sounds are normal  He exhibits no distension  There is no tenderness  Musculoskeletal: Normal range of motion  He exhibits no edema  Neurological: He is alert  awake   Skin: Skin is warm and dry  No rash noted  He is not diaphoretic  Psychiatric: He has a normal mood and affect  His behavior is normal    Vitals reviewed  Medications:    Current Outpatient Prescriptions:     amLODIPine (NORVASC) 5 mg tablet, Take 1 tablet (5 mg total) by mouth daily for 90 days, Disp: 90 tablet, Rfl: 3    ENSTILAR 0 005-0 064 % FOAM, APPLY TO SKIN ONCE DAILY  , Disp: , Rfl: 6    lisinopril (ZESTRIL) 20 mg tablet, Take 1 tablet by mouth daily, Disp: , Rfl:     lovastatin (MEVACOR) 40 MG tablet, Take 1 tablet by mouth daily, Disp: , Rfl:     Allergies:   Allergies as of 07/23/2018 - Reviewed 07/05/2018   Allergen Reaction Noted    Penicillins  08/29/2017    Fenofibrate Itching and Rash 07/10/2015       The following portions of the patient's history were reviewed and updated as appropriate: past family history, past surgical history and problem list     Laboratory Results:  Lab Results   Component Value Date    CALCIUM 10 1 04/02/2018     11/30/2017     11/30/2017     11/30/2017     11/30/2017    K 4 1 11/30/2017    K 4 1 11/30/2017    K 4 1 11/30/2017    K 4 1 11/30/2017    CO2 27 11/30/2017    CO2 27 11/30/2017    CO2 27 11/30/2017    CO2 27 11/30/2017     11/30/2017     11/30/2017     11/30/2017     11/30/2017    BUN 21 04/02/2018    CREATININE 1 54 (H) 04/02/2018        Lab Results   Component Value Date    PTH 49 11/30/2017    PTH 49 11/30/2017    PTH 49 11/30/2017    PTH 49 11/30/2017    CALCIUM 10 1 04/02/2018    CAION 5 8 (H) 11/30/2017    CAION 5 8 (H) 11/30/2017    CAION 5 8 (H) 11/30/2017    CAION 5 8 (H) 11/30/2017    PHOS 2 9 11/30/2017 PHOS 2 9 11/30/2017    PHOS 2 9 11/30/2017    PHOS 2 9 11/30/2017       Portions of the record may have been created with voice recognition software   Occasional wrong word or "sound a like" substitutions may have occurred due to the inherent limitations of voice recognition software   Read the chart carefully and recognize, using context, where substitutions have occurred

## 2018-08-01 LAB
BUN SERPL-MCNC: 21 MG/DL (ref 7–25)
BUN/CREAT SERPL: 15 (CALC) (ref 6–22)
CALCIUM SERPL-MCNC: 10.1 MG/DL (ref 8.6–10.3)
CHLORIDE SERPL-SCNC: 104 MMOL/L (ref 98–110)
CO2 SERPL-SCNC: 28 MMOL/L (ref 20–31)
CREAT SERPL-MCNC: 1.42 MG/DL (ref 0.7–1.25)
CREAT UR-MCNC: 133 MG/DL (ref 20–370)
GLUCOSE SERPL-MCNC: 79 MG/DL (ref 65–99)
POTASSIUM SERPL-SCNC: 4.3 MMOL/L (ref 3.5–5.3)
PROT UR-MCNC: 45 MG/DL (ref 5–25)
PROT/CREAT UR: 338 MG/G CREAT (ref 22–128)
SL AMB EGFR AFRICAN AMERICAN: 62 ML/MIN/1.73M2
SL AMB EGFR NON AFRICAN AMERICAN: 53 ML/MIN/1.73M2
SODIUM SERPL-SCNC: 137 MMOL/L (ref 135–146)

## 2018-08-13 LAB
ALBUMIN SERPL-MCNC: 4.2 G/DL (ref 3.6–5.1)
ALBUMIN/GLOB SERPL: 1.4 (CALC) (ref 1–2.5)
ALP SERPL-CCNC: 64 U/L (ref 40–115)
ALT SERPL-CCNC: 14 U/L (ref 9–46)
AST SERPL-CCNC: 16 U/L (ref 10–35)
BILIRUB SERPL-MCNC: 0.4 MG/DL (ref 0.2–1.2)
BUN SERPL-MCNC: 22 MG/DL (ref 7–25)
BUN/CREAT SERPL: 13 (CALC) (ref 6–22)
CALCIUM SERPL-MCNC: 10.1 MG/DL (ref 8.6–10.3)
CHLORIDE SERPL-SCNC: 104 MMOL/L (ref 98–110)
CHOLEST SERPL-MCNC: 174 MG/DL
CHOLEST/HDLC SERPL: 3.8 (CALC)
CO2 SERPL-SCNC: 26 MMOL/L (ref 20–32)
CREAT SERPL-MCNC: 1.69 MG/DL (ref 0.7–1.25)
GLOBULIN SER CALC-MCNC: 3 G/DL (CALC) (ref 1.9–3.7)
GLUCOSE SERPL-MCNC: 82 MG/DL (ref 65–99)
HDLC SERPL-MCNC: 46 MG/DL
LDLC SERPL CALC-MCNC: 113 MG/DL (CALC)
NONHDLC SERPL-MCNC: 128 MG/DL (CALC)
POTASSIUM SERPL-SCNC: 4.5 MMOL/L (ref 3.5–5.3)
PROT SERPL-MCNC: 7.2 G/DL (ref 6.1–8.1)
SL AMB EGFR AFRICAN AMERICAN: 50 ML/MIN/1.73M2
SL AMB EGFR NON AFRICAN AMERICAN: 43 ML/MIN/1.73M2
SODIUM SERPL-SCNC: 138 MMOL/L (ref 135–146)
TRIGL SERPL-MCNC: 63 MG/DL

## 2018-08-16 LAB — HEMOCCULT STL QL IA: NOT DETECTED

## 2018-08-28 ENCOUNTER — OFFICE VISIT (OUTPATIENT)
Dept: FAMILY MEDICINE CLINIC | Facility: CLINIC | Age: 63
End: 2018-08-28
Payer: COMMERCIAL

## 2018-08-28 VITALS
HEART RATE: 80 BPM | DIASTOLIC BLOOD PRESSURE: 70 MMHG | HEIGHT: 70 IN | WEIGHT: 194 LBS | BODY MASS INDEX: 27.77 KG/M2 | SYSTOLIC BLOOD PRESSURE: 110 MMHG

## 2018-08-28 DIAGNOSIS — N18.2 STAGE 2 CHRONIC KIDNEY DISEASE: ICD-10-CM

## 2018-08-28 DIAGNOSIS — E78.2 MIXED HYPERLIPIDEMIA: Primary | ICD-10-CM

## 2018-08-28 DIAGNOSIS — R80.9 PROTEINURIA, UNSPECIFIED TYPE: ICD-10-CM

## 2018-08-28 DIAGNOSIS — I10 ESSENTIAL HYPERTENSION: ICD-10-CM

## 2018-08-28 PROCEDURE — 3074F SYST BP LT 130 MM HG: CPT | Performed by: FAMILY MEDICINE

## 2018-08-28 PROCEDURE — 99214 OFFICE O/P EST MOD 30 MIN: CPT | Performed by: FAMILY MEDICINE

## 2018-08-28 PROCEDURE — 3078F DIAST BP <80 MM HG: CPT | Performed by: FAMILY MEDICINE

## 2018-08-28 NOTE — ASSESSMENT & PLAN NOTE
His cholesterol numbers are stable with the exception of the LDL going up from 104 to 113  He currently takes lovastatin 40 mg daily

## 2018-08-28 NOTE — PROGRESS NOTES
Assessment/Plan:    Hyperlipidemia  His cholesterol numbers are stable with the exception of the LDL going up from 104 to 113  He currently takes lovastatin 40 mg daily  Hypertension  His blood pressure is well controlled at 110/70 and he takes lisinopril 20 mg daily  Proteinuria  He sees Nephrology on a regular basis  Stage 2 chronic kidney disease  He sees Nephrology on a regular basis  Diagnoses and all orders for this visit:    Mixed hyperlipidemia  -     CBC and differential; Future  -     Comprehensive metabolic panel; Future  -     Lipid Panel with Direct LDL reflex; Future  -     TSH, 3rd generation; Future    Essential hypertension  -     CBC and differential; Future  -     Comprehensive metabolic panel; Future  -     Lipid Panel with Direct LDL reflex; Future  -     TSH, 3rd generation; Future    Proteinuria, unspecified type  -     CBC and differential; Future  -     Comprehensive metabolic panel; Future  -     Lipid Panel with Direct LDL reflex; Future  -     TSH, 3rd generation; Future    Stage 2 chronic kidney disease  -     CBC and differential; Future  -     Comprehensive metabolic panel; Future  -     Lipid Panel with Direct LDL reflex; Future  -     TSH, 3rd generation; Future          Subjective: Follow up hyperlipidemia, HTN, stage II CKD  Review BW results  Pt states there are no other concerns at this time  Pt notes he is scheduled with Dr Sumeet Sesay this Friday for a colonoscopy  -  [facility]     Patient ID: Hayley Heath is a 63 y o  male  This is a 62-year-old male who comes in for his regular 6 month visit  He is feeling well with no complaints or problems  His blood pressure is 110/70 and he is feeling well  His weight is down 2 lb from the previous visit to 194 lb  Reviewing his labs, his glucose is 82, his creatinine is 1 6 up from 1 4 and he does see a nephrologist on a regular basis    His hemoccults were negative and he is due for a colonoscopy by Dr Sumeet Sesay on Friday  His cholesterol numbers are stable but his LDL went up from 104 to 113  The following portions of the patient's history were reviewed and updated as appropriate: allergies, current medications, past family history, past medical history, past social history, past surgical history and problem list     Review of Systems   Constitutional: Negative  HENT: Negative  Eyes: Negative  Respiratory: Negative  Cardiovascular: Negative  Gastrointestinal: Negative  Endocrine: Negative  Genitourinary: Negative  Musculoskeletal: Negative  Skin: Negative  Allergic/Immunologic: Negative  Neurological: Negative  Hematological: Negative  Psychiatric/Behavioral: Negative  Objective:      /70 (BP Location: Left arm, Patient Position: Sitting, Cuff Size: Large)   Pulse 80   Ht 5' 10" (1 778 m)   Wt 88 kg (194 lb)   BMI 27 84 kg/m²          Physical Exam   Constitutional: He is oriented to person, place, and time  He appears well-developed and well-nourished  HENT:   Head: Normocephalic  Right Ear: External ear normal    Left Ear: External ear normal    Mouth/Throat: Oropharynx is clear and moist    Eyes: Conjunctivae and EOM are normal  Pupils are equal, round, and reactive to light  Neck: Normal range of motion  Neck supple  Cardiovascular: Normal rate, regular rhythm and normal heart sounds  Pulmonary/Chest: Effort normal and breath sounds normal    Abdominal: Soft  Bowel sounds are normal    Musculoskeletal: Normal range of motion  Neurological: He is alert and oriented to person, place, and time  Skin: Skin is warm  Psychiatric: He has a normal mood and affect  His behavior is normal  Judgment and thought content normal    Nursing note and vitals reviewed

## 2018-08-31 ENCOUNTER — ANESTHESIA (OUTPATIENT)
Dept: PERIOP | Facility: HOSPITAL | Age: 63
End: 2018-08-31
Payer: COMMERCIAL

## 2018-08-31 ENCOUNTER — HOSPITAL ENCOUNTER (OUTPATIENT)
Facility: HOSPITAL | Age: 63
Setting detail: OUTPATIENT SURGERY
Discharge: HOME | End: 2018-08-31
Attending: COLON & RECTAL SURGERY | Admitting: COLON & RECTAL SURGERY
Payer: COMMERCIAL

## 2018-08-31 ENCOUNTER — ANESTHESIA EVENT (OUTPATIENT)
Dept: PERIOP | Facility: HOSPITAL | Age: 63
End: 2018-08-31
Payer: COMMERCIAL

## 2018-08-31 VITALS
HEART RATE: 59 BPM | TEMPERATURE: 97.2 F | SYSTOLIC BLOOD PRESSURE: 130 MMHG | OXYGEN SATURATION: 96 % | HEIGHT: 70 IN | WEIGHT: 194 LBS | BODY MASS INDEX: 27.77 KG/M2 | DIASTOLIC BLOOD PRESSURE: 76 MMHG | RESPIRATION RATE: 16 BRPM

## 2018-08-31 DIAGNOSIS — Z86.010 HISTORY OF COLONIC POLYPS: ICD-10-CM

## 2018-08-31 DIAGNOSIS — Z12.11 ENCOUNTER FOR SCREENING FOR MALIGNANT NEOPLASM OF COLON: ICD-10-CM

## 2018-08-31 PROCEDURE — 88305 TISSUE EXAM BY PATHOLOGIST: CPT | Performed by: PATHOLOGY

## 2018-08-31 RX ORDER — PROPOFOL 10 MG/ML
INJECTION, EMULSION INTRAVENOUS CONTINUOUS PRN
Status: DISCONTINUED | OUTPATIENT
Start: 2018-08-31 | End: 2018-08-31 | Stop reason: SURG

## 2018-08-31 RX ORDER — SODIUM CHLORIDE 9 MG/ML
125 INJECTION, SOLUTION INTRAVENOUS CONTINUOUS
Status: DISCONTINUED | OUTPATIENT
Start: 2018-08-31 | End: 2018-08-31 | Stop reason: HOSPADM

## 2018-08-31 RX ORDER — PROPOFOL 10 MG/ML
INJECTION, EMULSION INTRAVENOUS AS NEEDED
Status: DISCONTINUED | OUTPATIENT
Start: 2018-08-31 | End: 2018-08-31 | Stop reason: SURG

## 2018-08-31 RX ADMIN — PROPOFOL 100 MCG/KG/MIN: 10 INJECTION, EMULSION INTRAVENOUS at 10:11

## 2018-08-31 RX ADMIN — PROPOFOL 100 MG: 10 INJECTION, EMULSION INTRAVENOUS at 10:11

## 2018-08-31 RX ADMIN — SODIUM CHLORIDE 125 ML/HR: 9 INJECTION, SOLUTION INTRAVENOUS at 07:39

## 2018-08-31 RX ADMIN — LIDOCAINE HYDROCHLORIDE 30 MG: 20 INJECTION, SOLUTION INTRAVENOUS at 10:11

## 2018-08-31 RX ADMIN — SODIUM CHLORIDE: 9 INJECTION, SOLUTION INTRAVENOUS at 09:36

## 2018-08-31 NOTE — DISCHARGE INSTRUCTIONS
[facility] [address], [address] [place] [address] [address]  Phone: [phone]    DISCHARGE INSTRUCTIONS:    1   ___ Complete Exam - Normal    2   ___ Exam normal, but entire colon not seen  We will discuss this with you  3   ___ Polyp(s) removed by "burning" - NO pathology report will follow    4   _1__ Polyp(s) removed by excision  Pathology report will be available in 4-5 days   Someone from our office will call you with results  5   ___ Exam prompted biopsies  Pathology report will be available in 4-5 days   Someone from our office will call you with results  6   ___ Exam demonstrated findings that need treatment  Prescriptions will be   Given to you  Return to our office in ____ weeks  Please call for appt  7   ___ Original office visit or colonoscopy findings necessitate an office visit  Please call to set up a new appointment    8   ___ Medication  __________________________________________        [address]:    - Go straight home and rest today    - No driving or drinking alcohol for 24 hours    - Resume regular diet and medications unless otherwise instructed  Coumadin and Plavix are blood thinners  You can resume these medications on __________      IF YOU ARE HAVING ANY FEVER, BLEEDING OR PERSISTENT PAIN IN THE ABDOMEN, PLEASE CALL OUR OFFICE ANY DAY OR TIME  [phone]

## 2018-08-31 NOTE — DISCHARGE SUMMARY
[facility]                                                                                 DISCHARGE SUMMARY        PATIENT NAME: Migel Yeboah    :  1955  MRN: [id number]  Pt Location:  GI ROOM 02    DISCHARGE DATE: 2018    PROCEDURE: Procedure(s) (LRB):  COLONOSCOPY (N/A)    Anesthesia Type: IV Sedation with Anesthesia    Complications: None    Specimen(s):  ID Type Source Tests Collected by Time Destination   1 : ascending colon Tissue Polyp, Colorectal TISSUE EXAM Adalberto Sorenson MD 2018 [address]: The patient was admitted for elective procedure  The procedure was uncomplicated and the the patient was discharged home post procedure          SIGNATURE: Adalberto Sorenson MD  DATE: 2018  TIME: 10:33 AM

## 2018-08-31 NOTE — OP NOTE
OPERATIVE REPORT  PATIENT NAME: Ashley Ramirez    :  1955  MRN: [id number]  Pt Location:  GI ROOM 02    SURGERY DATE: 2018    Indications:   Screening colonoscopy and high risk patient with previous history of colon polyps  Procedure:   Colonoscopy to cecum with snare polypectomy  Findings:   Ascending colon polyp  Anesthesia Type: IV Sedation with Anesthesia    Complications: None      Procedure: The patient was in the left lateral position  Sedation was administered by anesthesia  Rectal exam was unremarkable  The scope was introduced and passed to the cecum  The bowel preparation was good  The scope was withdrawn  The ascending colon was a 5 mm polyp excised by cold snare and collected  The scope was removed  Impression:   Personal history of colon polyps  Status post polypectomy  Plan:   Repeat colonoscopy in 3 years  Specimen(s):  ID Type Source Tests Collected by Time Destination   1 : ascending colon Tissue Polyp, Colorectal TISSUE EXAM Ruthy Hawley MD 2018 [id number]          Attestation: I was present for the entire procedure        Patient Disposition: PACU      SIGNATURE: Ruthy Hawley MD  DATE: 2018  TIME: 10:31 AM

## 2018-08-31 NOTE — PERIOPERATIVE NURSING NOTE
ivs out  Tolerated diet  Wants to go home  Discharged ambulatory after discharge instructions given and verbalized an understending of same

## 2018-08-31 NOTE — ANESTHESIA POSTPROCEDURE EVALUATION
Post-Op Assessment Note      CV Status:  Stable    Mental Status:  Alert and awake    Hydration Status:  Euvolemic    PONV Controlled:  Controlled    Airway Patency:  Patent    Post Op Vitals Reviewed: Yes          Staff: CRNA           BP   145/78   Temp     Pulse  82   Resp   16   SpO2   100

## 2018-08-31 NOTE — ANESTHESIA PREPROCEDURE EVALUATION
Review of Systems/Medical History          Cardiovascular  Hyperlipidemia, Hypertension controlled,    Pulmonary       GI/Hepatic    Bowel prep  Comment: H/o Colon polyp     Kidney disease CKD, Chronic kidney disease stage 2, Prostatic disorder, history of prostate cancer       Endo/Other  Negative endo/other ROS      GYN       Hematology  Negative hematology ROS      Musculoskeletal    Comment: psoriasis      Neurology  Negative neurology ROS      Psychology           Physical Exam    Airway    Mallampati score: II  TM Distance: >3 FB  Neck ROM: full     Dental       Cardiovascular  Cardiovascular exam normal    Pulmonary  Pulmonary exam normal     Other Findings        Anesthesia Plan  ASA Score- 2     Anesthesia Type- IV sedation with anesthesia with ASA Monitors  Additional Monitors:   Airway Plan:         Plan Factors-    Induction-     Postoperative Plan-     Informed Consent- Anesthetic plan and risks discussed with patient  I personally reviewed this patient with the CRNA  Discussed and agreed on the Anesthesia Plan with the CRNA  Adi Heredia

## 2018-10-17 NOTE — PROGRESS NOTES
10/18/2018      Chief Complaint   Patient presents with    Results     PSA       Assessment and Plan    63 y o  male managed by Dr Abel Baig    1  Small volume [place] 6 prostate cancer on active surveillance   - PSA 3 4 (10/1/18), 4 2 (6/25/18)   - in early 2019 due for a discussion of possibly performing his 3rd biopsy versus continued observation versus a multiparametric MRI of the prostate, will determine based off PSA trend  - FU 3 months with PSA prior and to discuss the above options       History of Present Illness  Neva Roach is a 63 y o  male here for follow up evaluation of small volume [place] 6 prostate cancer on active surveillance  Patient continues on active surveillance  His initial biopsy was performed 2/17/2012 and was positive for [place] 6 involving 25 in 30 percent of the cores  He underwent a 2nd biopsy 2/16/2018 for PSA of 4 4  This revealed revealed 3 of 12 cores positive for [place] 6 cancer between 5 in 30 percent of the cores  To his visit today is low at 3 4  Previously 4 2  His lower urinary tract symptoms are stable  These in his AUA symptom score are listed as below  Review of Systems   Constitutional: Negative for activity change, chills and fever  Gastrointestinal: Negative for abdominal distention and abdominal pain  Musculoskeletal: Negative for back pain and gait problem  Psychiatric/Behavioral: Negative for behavioral problems and confusion  Urinary Incontinence Screening      Most Recent Value   Urinary Incontinence   Urinary Incontinence? No   Incomplete emptying? Yes [sometimes]   Urinary frequency? No   Urinary urgency? No   Urinary hesitancy? Yes [always morning]   Dysuria (painful difficult urination)? No   Nocturia (waking up to use the bathroom)? Yes [more than 2x]   Straining (having to push to go)? No   Weak stream?  No   Intermittent stream?  No   Post void dribbling?   No        AUA SYMPTOM SCORE      Most Recent Value   AUA SYMPTOM SCORE   How often have you had a sensation of not emptying your bladder completely after you finished urinating? 2   How often have you had to urinate again less than two hours after you finished urinating? 2   How often have you found you stopped and started again several times when you urinate? 1   How often have you found it difficult to postpone urination? 0   How often have you had a weak urinary stream?  0   How often have you had to push or strain to begin urination? 0   How many times did you most typically get up to urinate from the time you went to bed at night until the time you got up in the morning? 2   Quality of Life: If you were to spend the rest of your life with your urinary condition just the way it is now, how would you feel about that?  3   AUA SYMPTOM SCORE  7          Past Medical History  Past Medical History:   Diagnosis Date    Adenocarcinoma of prostate ([facility] [address] )     Last assessed 08/08/17    Colon polyp     Hyperlipidemia     Hypertension        Past Social History  Past Surgical History:   Procedure Laterality Date    [place] COLONOSCOPY FLX DX W/COLLJ SPEC WHEN PFRMD N/A 8/31/2018    Procedure: COLONOSCOPY;  Surgeon: Ruth Patel MD;  Location: [facility];   Service: Colorectal    PROSTATE BIOPSY  02/16/2018    TONSILLECTOMY       History   Smoking Status    Never Smoker   Smokeless Tobacco    Never Used       Past Family History  Family History   Problem Relation Age of Onset    Arthritis Mother     Hyperlipidemia Mother     Hypertension Mother     Diabetes Son        Past Social history  Social History     Social History    Marital status: /Civil Union     Spouse name: N/A    Number of children: N/A    Years of education: N/A     Occupational History    retired      Social History Main Topics    Smoking status: Never Smoker    Smokeless tobacco: Never Used    Alcohol use Yes      Comment: very rarely    Drug use: No    Sexual activity: Not on file     Other Topics Concern    Not on file     Social History Narrative    No narrative on file       Current Medications  Current Outpatient Prescriptions   Medication Sig Dispense Refill    ENSTILAR 0 005-0 064 % FOAM APPLY TO SKIN ONCE DAILY  6    lisinopril (ZESTRIL) 20 mg tablet Take 20 mg by mouth daily        lovastatin (MEVACOR) 40 MG tablet Take 1 tablet by mouth daily       No current facility-administered medications for this visit  Allergies  Allergies   Allergen Reactions    Amlodipine Hives    Penicillins Hives    Fenofibrate Itching and Rash         The following portions of the patient's history were reviewed and updated as appropriate: allergies, current medications, past medical history, past social history, past surgical history and problem list       Vitals  Vitals:    10/18/18 0844   BP: 130/70   BP Location: Left arm   Patient Position: Sitting   Pulse: 98   Weight: 89 4 kg (197 lb)   Height: 5' 10" (1 778 m)       Physical Exam  Constitutional   General appearance: Patient is seated and in no acute distress, well appearing and well nourished  Head and Face   Head and face: Normal     Eyes   Conjunctiva and lids: No erythema, swelling or discharge  Ears, Nose, Mouth, and Throat   Hearing: Normal     Pulmonary   Respiratory effort: No increased work of breathing or signs of respiratory distress  Cardiovascular   Examination of extremities for edema and/or varicosities: Normal     Abdomen   Abdomen: Non-tender, no masses  Musculoskeletal   Gait and station: Normal     Skin   Skin and subcutaneous tissue: Warm, dry, and intact  No visible lesions or rashes    Psychiatric   Judgment and insight: Normal  Recent and remote memory:  Normal  Mood and affect: Normal      Results  No results found for this or any previous visit (from the past 1 hour(s)) ]  Lab Results   Component Value Date    PSA 4 2 (H) 06/25/2018     Lab Results   Component Value Date    CALCIUM 10 1 08/13/2018     11/30/2017     11/30/2017     11/30/2017     11/30/2017    K 4 5 08/13/2018    CO2 26 08/13/2018     08/13/2018    BUN 22 08/13/2018    CREATININE 1 48 (H) 11/30/2017    CREATININE 1 48 (H) 11/30/2017    CREATININE 1 48 (H) 11/30/2017    CREATININE 1 48 (H) 11/30/2017     Lab Results   Component Value Date    WBC 8 6 04/02/2018    HGB 16 0 04/02/2018    HCT 49 8 04/02/2018    MCV 81 8 04/02/2018     04/02/2018       Orders  Orders Placed This Encounter   Procedures    PSA Total, Diagnostic     Standing Status:   Future     Standing Expiration Date:   10/18/2019

## 2018-10-18 ENCOUNTER — OFFICE VISIT (OUTPATIENT)
Dept: UROLOGY | Facility: CLINIC | Age: 63
End: 2018-10-18
Payer: COMMERCIAL

## 2018-10-18 VITALS
DIASTOLIC BLOOD PRESSURE: 70 MMHG | BODY MASS INDEX: 28.2 KG/M2 | SYSTOLIC BLOOD PRESSURE: 130 MMHG | HEART RATE: 98 BPM | HEIGHT: 70 IN | WEIGHT: 197 LBS

## 2018-10-18 DIAGNOSIS — C61 ADENOCARCINOMA OF PROSTATE (HCC): Primary | ICD-10-CM

## 2018-10-18 PROCEDURE — 99213 OFFICE O/P EST LOW 20 MIN: CPT | Performed by: PHYSICIAN ASSISTANT

## 2018-11-20 ENCOUNTER — TELEPHONE (OUTPATIENT)
Dept: NEPHROLOGY | Facility: CLINIC | Age: 63
End: 2018-11-20

## 2018-11-20 NOTE — TELEPHONE ENCOUNTER
Called and schedule 6 month follow up with patient  He is scheduled for January 22,2018 at 8am  I also am sending out 2 lab slips that he needs to complete before his follow up appointment  Confirmed mailing address with patient and along with printed lab slips I enclosed an appointment reminder card

## 2018-12-20 DIAGNOSIS — I10 HYPERTENSION, UNSPECIFIED TYPE: Primary | ICD-10-CM

## 2018-12-20 RX ORDER — LISINOPRIL 20 MG/1
20 TABLET ORAL DAILY
Qty: 90 TABLET | Refills: 1 | Status: SHIPPED | OUTPATIENT
Start: 2018-12-20 | End: 2019-01-22 | Stop reason: SDUPTHER

## 2019-01-08 LAB
BUN SERPL-MCNC: 23 MG/DL (ref 7–25)
BUN/CREAT SERPL: 15 (CALC) (ref 6–22)
CALCIUM SERPL-MCNC: 10.1 MG/DL (ref 8.6–10.3)
CHLORIDE SERPL-SCNC: 102 MMOL/L (ref 98–110)
CO2 SERPL-SCNC: 27 MMOL/L (ref 20–32)
CREAT SERPL-MCNC: 1.5 MG/DL (ref 0.7–1.25)
CREAT UR-MCNC: 99 MG/DL (ref 20–320)
GLUCOSE SERPL-MCNC: 81 MG/DL (ref 65–99)
POTASSIUM SERPL-SCNC: 4.6 MMOL/L (ref 3.5–5.3)
PROT UR-MCNC: 45 MG/DL (ref 5–25)
PROT/CREAT UR: 455 MG/G CREAT (ref 22–128)
PSA SERPL-MCNC: 3.7 NG/ML
SL AMB EGFR AFRICAN AMERICAN: 57 ML/MIN/1.73M2
SL AMB EGFR NON AFRICAN AMERICAN: 50 ML/MIN/1.73M2
SODIUM SERPL-SCNC: 136 MMOL/L (ref 135–146)

## 2019-01-17 ENCOUNTER — TELEPHONE (OUTPATIENT)
Dept: UROLOGY | Facility: AMBULATORY SURGERY CENTER | Age: 64
End: 2019-01-17

## 2019-01-17 NOTE — TELEPHONE ENCOUNTER
Patient requires a referral his appt is with Dr Fry on 1/21/19 at [facility]

## 2019-01-21 ENCOUNTER — TELEPHONE (OUTPATIENT)
Dept: NEPHROLOGY | Facility: CLINIC | Age: 64
End: 2019-01-21

## 2019-01-21 ENCOUNTER — OFFICE VISIT (OUTPATIENT)
Dept: UROLOGY | Facility: CLINIC | Age: 64
End: 2019-01-21
Payer: COMMERCIAL

## 2019-01-21 VITALS
WEIGHT: 198 LBS | BODY MASS INDEX: 28.35 KG/M2 | SYSTOLIC BLOOD PRESSURE: 150 MMHG | HEIGHT: 70 IN | DIASTOLIC BLOOD PRESSURE: 80 MMHG | HEART RATE: 73 BPM

## 2019-01-21 DIAGNOSIS — C61 PROSTATE CA (HCC): Primary | ICD-10-CM

## 2019-01-21 PROCEDURE — 99214 OFFICE O/P EST MOD 30 MIN: CPT | Performed by: UROLOGY

## 2019-01-21 NOTE — PROGRESS NOTES
1/21/2019    Miles Isai  1955  [id number]        Assessment  [place] 6 prostate cancer on active surveillance      Discussion  We discussed his PSA which is decreased to 3 7 on active surveillance  Prior to considering a 3rd biopsy we discussed performing a multiparametric MRI of the prostate  Pending the results of the MRI we will hold on a repeat prostate biopsy at this time  The patient is amenable with the plan  History of Present Illness  66 y o  male with a history of [place] 6 prostate cancer initially diagnosed in 2017  His PSA level at that time was 4 4  He had a follow-up prostate biopsy performed in February 2018  which continued to show 3 of 12 cores revealing [place] 6 prostate cancer  He returns in follow-up today  His PSA has decreased to 3 7  We discussed performing a multiparametric MRI as part of surveillance prior to proceeding with 3rd prostate biopsy versus continued follow-up with his next PSA level  The patient is amenable with proceeding with MRI of the prostate at this time  He denies any significant lower urinary tract symptoms  AUA Symptom Score  AUA SYMPTOM SCORE      Most Recent Value   AUA SYMPTOM SCORE   How often have you had a sensation of not emptying your bladder completely after you finished urinating? 2   How often have you had to urinate again less than two hours after you finished urinating? 2   How often have you found you stopped and started again several times when you urinate? 2   How often have you found it difficult to postpone urination? 1   How often have you had a weak urinary stream?  1   How often have you had to push or strain to begin urination? 1   How many times did you most typically get up to urinate from the time you went to bed at night until the time you got up in the morning?   3   Quality of Life: If you were to spend the rest of your life with your urinary condition just the way it is now, how would you feel about that?  4   AUA SYMPTOM SCORE  12          Review of Systems  Review of Systems   Constitutional: Negative  HENT: Negative  Eyes: Negative  Respiratory: Negative  Cardiovascular: Negative  Gastrointestinal: Negative  Endocrine: Negative  Genitourinary: Negative  Musculoskeletal: Negative  Skin: Negative  Allergic/Immunologic: Negative  Neurological: Negative  Hematological: Negative  Psychiatric/Behavioral: Negative  Past Medical History  Past Medical History:   Diagnosis Date    Adenocarcinoma of prostate ([address] )     Last assessed 08/08/17    Colon polyp     Hyperlipidemia     Hypertension        Past Social History  Past Surgical History:   Procedure Laterality Date    [place] COLONOSCOPY FLX DX W/COLLJ SPEC WHEN PFRMD N/A 8/31/2018    Procedure: COLONOSCOPY;  Surgeon: Lobito Ibarra MD;  Location: [facility]; Service: Colorectal    PROSTATE BIOPSY  02/16/2018    TONSILLECTOMY         Past Family History  Family History   Problem Relation Age of Onset    Arthritis Mother     Hyperlipidemia Mother     Hypertension Mother     Diabetes Son        Past Social history  Social History     Social History    Marital status: /Civil Union     Spouse name: N/A    Number of children: N/A    Years of education: N/A     Occupational History    retired      Social History Main Topics    Smoking status: Never Smoker    Smokeless tobacco: Never Used    Alcohol use Yes      Comment: very rarely    Drug use: No    Sexual activity: Not on file     Other Topics Concern    Not on file     Social History Narrative    No narrative on file       Current Medications  Current Outpatient Prescriptions   Medication Sig Dispense Refill    ENSTILAR 0 005-0 064 % FOAM APPLY TO SKIN ONCE DAILY    6    lisinopril (ZESTRIL) 20 mg tablet Take 1 tablet (20 mg total) by mouth daily 90 tablet 1    lovastatin (MEVACOR) 40 MG tablet Take 1 tablet by mouth daily       No current facility-administered medications for this visit  Allergies  Allergies   Allergen Reactions    Amlodipine Hives    Penicillins Hives    Fenofibrate Itching and Rash       Past Medical History, Social History, Family History, medications and allergies were reviewed  Vitals  Vitals:    01/21/19 1418   BP: 150/80   BP Location: Left arm   Patient Position: Sitting   Cuff Size: Standard   Pulse: 73   Weight: 89 8 kg (198 lb)   Height: 5' 10" (1 778 m)       Physical Exam  Physical Exam    On examination he is in no acute distress  Gait normal   Affect normal    Results  Lab Results   Component Value Date    PSA 3 7 01/07/2019    PSA 4 2 (H) 06/25/2018     Lab Results   Component Value Date    CALCIUM 10 1 01/07/2019     11/30/2017     11/30/2017     11/30/2017     11/30/2017    K 4 6 01/07/2019    CO2 27 01/07/2019     01/07/2019    BUN 23 01/07/2019    CREATININE 1 48 (H) 11/30/2017    CREATININE 1 48 (H) 11/30/2017    CREATININE 1 48 (H) 11/30/2017    CREATININE 1 48 (H) 11/30/2017     Lab Results   Component Value Date    WBC 8 6 04/02/2018    HGB 16 0 04/02/2018    HCT 49 8 04/02/2018    MCV 81 8 04/02/2018     04/02/2018         Office Urine Dip  No results found for this or any previous visit (from the past 1 hour(s))  ]      Total visit time was 25 minutes of which over 50% was spent on counseling

## 2019-01-22 ENCOUNTER — OFFICE VISIT (OUTPATIENT)
Dept: NEPHROLOGY | Facility: CLINIC | Age: 64
End: 2019-01-22
Payer: COMMERCIAL

## 2019-01-22 VITALS
WEIGHT: 194 LBS | DIASTOLIC BLOOD PRESSURE: 93 MMHG | SYSTOLIC BLOOD PRESSURE: 160 MMHG | HEIGHT: 70 IN | HEART RATE: 93 BPM | BODY MASS INDEX: 27.77 KG/M2

## 2019-01-22 DIAGNOSIS — I10 ESSENTIAL HYPERTENSION: ICD-10-CM

## 2019-01-22 DIAGNOSIS — N18.2 STAGE 2 CHRONIC KIDNEY DISEASE: Primary | ICD-10-CM

## 2019-01-22 DIAGNOSIS — R80.9 PROTEINURIA, UNSPECIFIED TYPE: ICD-10-CM

## 2019-01-22 DIAGNOSIS — R31.29 MICROSCOPIC HEMATURIA: ICD-10-CM

## 2019-01-22 DIAGNOSIS — E83.52 HYPERCALCEMIA: ICD-10-CM

## 2019-01-22 DIAGNOSIS — I10 HYPERTENSION, UNSPECIFIED TYPE: ICD-10-CM

## 2019-01-22 PROCEDURE — 99214 OFFICE O/P EST MOD 30 MIN: CPT | Performed by: INTERNAL MEDICINE

## 2019-01-22 RX ORDER — LISINOPRIL 20 MG/1
20 TABLET ORAL 2 TIMES DAILY
Qty: 180 TABLET | Refills: 0 | Status: SHIPPED | OUTPATIENT
Start: 2019-01-22 | End: 2019-02-04 | Stop reason: SDUPTHER

## 2019-01-22 NOTE — PATIENT INSTRUCTIONS
1  Chronic kidney disease stage 2 in setting of prostatic adenocarcinoma - eGFR ~70s ml/min per CKD EPI equation  Last sCr 1 5 (stable)  -continue to avoid nonsteroidals(motrin, aleve, advil, ibuprofen, toradol, naproxen, celebrex, indomethacin and meloxicam)  -very low risk of progression to End stage disease   -renal u/s August 2017 shows echogenic kidneys with b/l renal cysts  The cause of this is unclear  +protein in urine   -Check CMP in March 2019     2  HTN - BP elevated both in office at home  Have correlated home BP cuff which appears to be accurate  Continue low salt diet   -continue lisinopril 20mg daily  No longer on amlodipine 5mg daily  -will increase lisinopril 20mg to twice daily for total 40mg a day(max dose)  -Call office with BP readings  Call end of the week  Check twice daily but do not check first thing in the morning as BP tends to run higher in everyone then  BP log provided      3  Proteinuria - noted on UA  UpCr 455mg/g, slightly higher than prior  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       4  Prostate cancer - Undergoing active surveillance by urology  Last PSA 3 7     5  microscopic hematuria - does not appear to be glomerular in origin and UA shows only 0-2 RBCs as of 11/2017  Repeat UA with microscopy showed 2+ blood, 1+ protein with only 0-2 RBCs  -repeat urinalysis with microscopy  -May have underlying TBM vs IgAN but without significant proteinuria, no need for renal biopsy       6  Hypercalcemia  - uncorrected calcium 10 1 as of 1/7/19  Will monitor  Check CMP, phos, PTH, mag levels  Will repeat SPEP/UPEP/FLC as well  There was a faint band in the past on SPEP  Do not see immunofixation results  Will also order serum immunofixation  RTC in 6 months

## 2019-01-22 NOTE — LETTER
January 22, 2019     [id number]  [place] [address] [employer]    Patient: Amanda Peña   YOB: 1955   Date of Visit: 1/22/2019       Dear Dr Nadeem Juan:    Thank you for referring Amanda Peña to me for evaluation  Below are my notes for this consultation  If you have questions, please do not hesitate to call me  I look forward to following your patient along with you  Sincerely,        Bishop Robles DO        CC: No Recipients  Bishop Robles DO  1/22/2019  8:24 AM  Sign at close encounter  [facility] NOTE   Amanda Peña 66 y o  male MRN: [id number]  DATE: 1/22/2019  Reason for visit:   Chief Complaint   Patient presents with    Chronic Kidney Disease    Follow-up        Patient Instructions   1  Chronic kidney disease stage 2 in setting of prostatic adenocarcinoma - eGFR ~70s ml/min per CKD EPI equation  Last sCr 1 5 (stable)  -continue to avoid nonsteroidals(motrin, aleve, advil, ibuprofen, toradol, naproxen, celebrex, indomethacin and meloxicam)  -very low risk of progression to End stage disease   -renal u/s August 2017 shows echogenic kidneys with b/l renal cysts  The cause of this is unclear  +protein in urine   -Check CMP in March 2019     2  HTN - BP elevated both in office at home  Have correlated home BP cuff which appears to be accurate  Continue low salt diet   -continue lisinopril 20mg daily  No longer on amlodipine 5mg daily  -will increase lisinopril 20mg to twice daily for total 40mg a day(max dose)  -Call office with BP readings  Call end of the week  Check twice daily but do not check first thing in the morning as BP tends to run higher in everyone then  BP log provided      3  Proteinuria - noted on UA  UpCr 455mg/g, slightly higher than prior  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       4  Prostate cancer - Undergoing active surveillance by urology   Last PSA 3 7     5  microscopic hematuria - does not appear to be glomerular in origin and UA shows only 0-2 RBCs as of 11/2017  Repeat UA with microscopy showed 2+ blood, 1+ protein with only 0-2 RBCs  -repeat urinalysis with microscopy  -May have underlying TBM vs IgAN but without significant proteinuria, no need for renal biopsy       6  Hypercalcemia  - uncorrected calcium 10 1 as of 1/7/19  Will monitor  Check CMP, phos, PTH, mag levels  Will repeat SPEP/UPEP/FLC as well  There was a faint band in the past on SPEP  Do not see immunofixation results  Will also order serum immunofixation  RTC in 6 months  Asad Vidales was seen today for chronic kidney disease and follow-up  Diagnoses and all orders for this visit:    Stage 2 chronic kidney disease  -     Comprehensive metabolic panel; Future  -     Protein, Total and Protein Electrophoresis with Immunofixation; Future  -     Basic metabolic panel; Future    Essential hypertension    Proteinuria, unspecified type  -     Protein, Total and Protein Electrophoresis with Immunofixation; Future    Hypercalcemia  -     PTH, intact; Future  -     Magnesium; Future  -     Phosphorus; Future    Microscopic hematuria  -     Urinalysis with microscopic; Future    Hypertension, unspecified type  -     lisinopril (ZESTRIL) 20 mg tablet; Take 1 tablet (20 mg total) by mouth 2 (two) times a day    Other orders  -     Cancel: Protein electrophoresis, serum; Future        Assessment/Plan:  1  Chronic kidney disease stage 2 in setting of prostatic adenocarcinoma - eGFR ~70s ml/min per CKD EPI equation  Last sCr 1 5 (stable)  -continue to avoid nonsteroidals(motrin, aleve, advil, ibuprofen, toradol, naproxen, celebrex, indomethacin and meloxicam)  -very low risk of progression to End stage disease   -renal u/s August 2017 shows echogenic kidneys with b/l renal cysts  The cause of this is unclear  +protein in urine   -Check CMP in March 2019     2  HTN - BP elevated both in office at home   Have correlated home BP cuff which appears to be accurate  Continue low salt diet   -continue lisinopril 20mg daily  No longer on amlodipine 5mg daily  -will increase lisinopril 20mg to twice daily for total 40mg a day(max dose)  -Call office with BP readings  Call end of the week  Check twice daily but do not check first thing in the morning as BP tends to run higher in everyone then  BP log provided      3  Proteinuria - noted on UA  UpCr 455mg/g, slightly higher than prior  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       4  Prostate cancer - Undergoing active surveillance by urology  Last PSA 3 7     5  microscopic hematuria - does not appear to be glomerular in origin and UA shows only 0-2 RBCs as of 11/2017  Repeat UA with microscopy showed 2+ blood, 1+ protein with only 0-2 RBCs  -repeat urinalysis with microscopy  -May have underlying TBM vs IgAN but without significant proteinuria, no need for renal biopsy       6  Hypercalcemia  - uncorrected calcium 10 1 as of 1/7/19  Will monitor  Check CMP, phos, PTH, mag levels  Will repeat SPEP/UPEP/FLC as well  There was a faint band in the past on SPEP  Do not see immunofixation results  Will also order serum immunofixation  SUBJECTIVE / INTERVAL HISTORY:  66 y o  male presents in follow up of CKD  Manuel Mendoza denies any recent illness/hospitalizations/medication changes since last office visit  Denies NSAID use  BP at home varies 140-160/90s  Review of Systems   Constitutional: Negative for chills and fever  HENT: Negative for sore throat  Eyes: Negative for visual disturbance  Respiratory: Negative for cough and shortness of breath  Cardiovascular: Negative for chest pain and leg swelling  Gastrointestinal: Negative for abdominal pain, constipation, diarrhea, nausea and vomiting  Endocrine: Negative for polyuria  Genitourinary: Negative for decreased urine volume, difficulty urinating, dysuria and hematuria     Musculoskeletal: Negative for back pain and myalgias  Skin: Negative for rash  Neurological: Negative for dizziness, light-headedness and numbness  Psychiatric/Behavioral: Negative for confusion  OBJECTIVE:  /84 (BP Location: Left arm, Patient Position: Sitting, Cuff Size: Standard)   Pulse 68   Ht 5' 10" (1 778 m)   Wt 88 kg (194 lb)   BMI 27 84 kg/m²   Body mass index is 27 84 kg/m²  Physical exam:  Physical Exam   Constitutional: He appears well-developed and well-nourished  No distress  HENT:   Head: Normocephalic and atraumatic  Mouth/Throat: No oropharyngeal exudate  Eyes: Right eye exhibits no discharge  Left eye exhibits no discharge  No scleral icterus  Neck: Neck supple  Cardiovascular: Normal rate, regular rhythm and normal heart sounds  Pulmonary/Chest: Effort normal and breath sounds normal  He has no wheezes  He has no rales  Abdominal: Soft  Bowel sounds are normal  He exhibits no distension  There is no tenderness  Musculoskeletal: Normal range of motion  He exhibits no edema  Neurological: He is alert  awake   Skin: Skin is warm and dry  No rash noted  He is not diaphoretic  Psychiatric: He has a normal mood and affect  His behavior is normal    Vitals reviewed  Medications:    Current Outpatient Prescriptions:     ENSTILAR 0 005-0 064 % FOAM, APPLY TO SKIN ONCE DAILY  , Disp: , Rfl: 6    lisinopril (ZESTRIL) 20 mg tablet, Take 1 tablet (20 mg total) by mouth daily, Disp: 90 tablet, Rfl: 1    lovastatin (MEVACOR) 40 MG tablet, Take 1 tablet by mouth daily, Disp: , Rfl:     Allergies:   Allergies as of 01/22/2019 - Reviewed 01/22/2019   Allergen Reaction Noted    Amlodipine Hives 07/23/2018    Penicillins Hives 08/29/2017    Fenofibrate Itching and Rash 07/10/2015       The following portions of the patient's history were reviewed and updated as appropriate: past family history, past surgical history and problem list     Laboratory Results:  Lab Results   Component Value Date    CALCIUM 10 1 01/07/2019     11/30/2017     11/30/2017     11/30/2017     11/30/2017    K 4 6 01/07/2019    CO2 27 01/07/2019     01/07/2019    BUN 23 01/07/2019    CREATININE 1 48 (H) 11/30/2017    CREATININE 1 48 (H) 11/30/2017    CREATININE 1 48 (H) 11/30/2017    CREATININE 1 48 (H) 11/30/2017        Lab Results   Component Value Date    PTH 49 11/30/2017    PTH 49 11/30/2017    PTH 49 11/30/2017    PTH 49 11/30/2017    CALCIUM 10 1 01/07/2019    CAION 5 8 (H) 11/30/2017    CAION 5 8 (H) 11/30/2017    CAION 5 8 (H) 11/30/2017    CAION 5 8 (H) 11/30/2017    PHOS 2 9 11/30/2017    PHOS 2 9 11/30/2017    PHOS 2 9 11/30/2017    PHOS 2 9 11/30/2017       Portions of the record may have been created with voice recognition software   Occasional wrong word or "sound a like" substitutions may have occurred due to the inherent limitations of voice recognition software   Read the chart carefully and recognize, using context, where substitutions have occurred

## 2019-01-22 NOTE — PROGRESS NOTES
NEPHROLOGY OUTPATIENT PROGRESS NOTE   Ashlee Stack 66 y o  male MRN: [id number]  DATE: 1/22/2019  Reason for visit:   Chief Complaint   Patient presents with    Chronic Kidney Disease    Follow-up        Patient Instructions   1  Chronic kidney disease stage 2 in setting of prostatic adenocarcinoma - eGFR ~70s ml/min per CKD EPI equation  Last sCr 1 5 (stable)  -continue to avoid nonsteroidals(motrin, aleve, advil, ibuprofen, toradol, naproxen, celebrex, indomethacin and meloxicam)  -very low risk of progression to End stage disease   -renal u/s August 2017 shows echogenic kidneys with b/l renal cysts  The cause of this is unclear  +protein in urine   -Check CMP in March 2019     2  HTN - BP elevated both in office at home  Have correlated home BP cuff which appears to be accurate  Continue low salt diet   -continue lisinopril 20mg daily  No longer on amlodipine 5mg daily  -will increase lisinopril 20mg to twice daily for total 40mg a day(max dose)  -Call office with BP readings  Call end of the week  Check twice daily but do not check first thing in the morning as BP tends to run higher in everyone then  BP log provided      3  Proteinuria - noted on UA  UpCr 455mg/g, slightly higher than prior  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       4  Prostate cancer - Undergoing active surveillance by urology  Last PSA 3 7     5  microscopic hematuria - does not appear to be glomerular in origin and UA shows only 0-2 RBCs as of 11/2017  Repeat UA with microscopy showed 2+ blood, 1+ protein with only 0-2 RBCs  -repeat urinalysis with microscopy  -May have underlying TBM vs IgAN but without significant proteinuria, no need for renal biopsy       6  Hypercalcemia  - uncorrected calcium 10 1 as of 1/7/19  Will monitor  Check CMP, phos, PTH, mag levels  Will repeat SPEP/UPEP/FLC as well  There was a faint band in the past on SPEP  Do not see immunofixation results   Will also order serum immunofixation  RTC in 6 months  Tracey Herzog was seen today for chronic kidney disease and follow-up  Diagnoses and all orders for this visit:    Stage 2 chronic kidney disease  -     Comprehensive metabolic panel; Future  -     Protein, Total and Protein Electrophoresis with Immunofixation; Future  -     Basic metabolic panel; Future    Essential hypertension    Proteinuria, unspecified type  -     Protein, Total and Protein Electrophoresis with Immunofixation; Future    Hypercalcemia  -     PTH, intact; Future  -     Magnesium; Future  -     Phosphorus; Future    Microscopic hematuria  -     Urinalysis with microscopic; Future    Hypertension, unspecified type  -     lisinopril (ZESTRIL) 20 mg tablet; Take 1 tablet (20 mg total) by mouth 2 (two) times a day    Other orders  -     Cancel: Protein electrophoresis, serum; Future        Assessment/Plan:  1  Chronic kidney disease stage 2 in setting of prostatic adenocarcinoma - eGFR ~70s ml/min per CKD EPI equation  Last sCr 1 5 (stable)  -continue to avoid nonsteroidals(motrin, aleve, advil, ibuprofen, toradol, naproxen, celebrex, indomethacin and meloxicam)  -very low risk of progression to End stage disease   -renal u/s August 2017 shows echogenic kidneys with b/l renal cysts  The cause of this is unclear  +protein in urine   -Check CMP in March 2019     2  HTN - BP elevated both in office at home  Have correlated home BP cuff which appears to be accurate  Continue low salt diet   -continue lisinopril 20mg daily  No longer on amlodipine 5mg daily  -will increase lisinopril 20mg to twice daily for total 40mg a day(max dose)  -Call office with BP readings  Call end of the week  Check twice daily but do not check first thing in the morning as BP tends to run higher in everyone then  BP log provided      3  Proteinuria - noted on UA  UpCr 455mg/g, slightly higher than prior   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       4  Prostate cancer - Undergoing active surveillance by urology  Last PSA 3 7     5  microscopic hematuria - does not appear to be glomerular in origin and UA shows only 0-2 RBCs as of 11/2017  Repeat UA with microscopy showed 2+ blood, 1+ protein with only 0-2 RBCs  -repeat urinalysis with microscopy  -May have underlying TBM vs IgAN but without significant proteinuria, no need for renal biopsy       6  Hypercalcemia  - uncorrected calcium 10 1 as of 1/7/19  Will monitor  Check CMP, phos, PTH, mag levels  Will repeat SPEP/UPEP/FLC as well  There was a faint band in the past on SPEP  Do not see immunofixation results  Will also order serum immunofixation  SUBJECTIVE / INTERVAL HISTORY:  66 y o  male presents in follow up of CKD  Arianna Candelario denies any recent illness/hospitalizations/medication changes since last office visit  Denies NSAID use  BP at home varies 140-160/90s  Review of Systems   Constitutional: Negative for chills and fever  HENT: Negative for sore throat  Eyes: Negative for visual disturbance  Respiratory: Negative for cough and shortness of breath  Cardiovascular: Negative for chest pain and leg swelling  Gastrointestinal: Negative for abdominal pain, constipation, diarrhea, nausea and vomiting  Endocrine: Negative for polyuria  Genitourinary: Negative for decreased urine volume, difficulty urinating, dysuria and hematuria  Musculoskeletal: Negative for back pain and myalgias  Skin: Negative for rash  Neurological: Negative for dizziness, light-headedness and numbness  Psychiatric/Behavioral: Negative for confusion  OBJECTIVE:  /84 (BP Location: Left arm, Patient Position: Sitting, Cuff Size: Standard)   Pulse 68   Ht 5' 10" (1 778 m)   Wt 88 kg (194 lb)   BMI 27 84 kg/m²  Body mass index is 27 84 kg/m²  Physical exam:  Physical Exam   Constitutional: He appears well-developed and well-nourished  No distress     HENT:   Head: Normocephalic and atraumatic  Mouth/Throat: No oropharyngeal exudate  Eyes: Right eye exhibits no discharge  Left eye exhibits no discharge  No scleral icterus  Neck: Neck supple  Cardiovascular: Normal rate, regular rhythm and normal heart sounds  Pulmonary/Chest: Effort normal and breath sounds normal  He has no wheezes  He has no rales  Abdominal: Soft  Bowel sounds are normal  He exhibits no distension  There is no tenderness  Musculoskeletal: Normal range of motion  He exhibits no edema  Neurological: He is alert  awake   Skin: Skin is warm and dry  No rash noted  He is not diaphoretic  Psychiatric: He has a normal mood and affect  His behavior is normal    Vitals reviewed  Medications:    Current Outpatient Prescriptions:     ENSTILAR 0 005-0 064 % FOAM, APPLY TO SKIN ONCE DAILY  , Disp: , Rfl: 6    lisinopril (ZESTRIL) 20 mg tablet, Take 1 tablet (20 mg total) by mouth daily, Disp: 90 tablet, Rfl: 1    lovastatin (MEVACOR) 40 MG tablet, Take 1 tablet by mouth daily, Disp: , Rfl:     Allergies:   Allergies as of 01/22/2019 - Reviewed 01/22/2019   Allergen Reaction Noted    Amlodipine Hives 07/23/2018    Penicillins Hives 08/29/2017    Fenofibrate Itching and Rash 07/10/2015       The following portions of the patient's history were reviewed and updated as appropriate: past family history, past surgical history and problem list     Laboratory Results:  Lab Results   Component Value Date    CALCIUM 10 1 01/07/2019     11/30/2017     11/30/2017     11/30/2017     11/30/2017    K 4 6 01/07/2019    CO2 27 01/07/2019     01/07/2019    BUN 23 01/07/2019    CREATININE 1 48 (H) 11/30/2017    CREATININE 1 48 (H) 11/30/2017    CREATININE 1 48 (H) 11/30/2017    CREATININE 1 48 (H) 11/30/2017        Lab Results   Component Value Date    PTH 49 11/30/2017    PTH 49 11/30/2017    PTH 49 11/30/2017    PTH 49 11/30/2017    CALCIUM 10 1 01/07/2019    CAION 5 8 (H) 11/30/2017    CAION 5 8 (H) 11/30/2017    CAION 5 8 (H) 11/30/2017    CAION 5 8 (H) 11/30/2017    PHOS 2 9 11/30/2017    PHOS 2 9 11/30/2017    PHOS 2 9 11/30/2017    PHOS 2 9 11/30/2017       Portions of the record may have been created with voice recognition software   Occasional wrong word or "sound a like" substitutions may have occurred due to the inherent limitations of voice recognition software   Read the chart carefully and recognize, using context, where substitutions have occurred

## 2019-01-28 LAB
BUN SERPL-MCNC: 23 MG/DL (ref 7–25)
BUN/CREAT SERPL: 15 (CALC) (ref 6–22)
CALCIUM SERPL-MCNC: 9.9 MG/DL (ref 8.6–10.3)
CHLORIDE SERPL-SCNC: 103 MMOL/L (ref 98–110)
CO2 SERPL-SCNC: 28 MMOL/L (ref 20–32)
CREAT SERPL-MCNC: 1.54 MG/DL (ref 0.7–1.25)
GLUCOSE SERPL-MCNC: 72 MG/DL (ref 65–99)
POTASSIUM SERPL-SCNC: 4.5 MMOL/L (ref 3.5–5.3)
SL AMB EGFR AFRICAN AMERICAN: 56 ML/MIN/1.73M2
SL AMB EGFR NON AFRICAN AMERICAN: 48 ML/MIN/1.73M2
SODIUM SERPL-SCNC: 139 MMOL/L (ref 135–146)

## 2019-02-04 ENCOUNTER — TELEPHONE (OUTPATIENT)
Dept: UROLOGY | Facility: AMBULATORY SURGERY CENTER | Age: 64
End: 2019-02-04

## 2019-02-04 ENCOUNTER — OFFICE VISIT (OUTPATIENT)
Dept: FAMILY MEDICINE CLINIC | Facility: CLINIC | Age: 64
End: 2019-02-04
Payer: COMMERCIAL

## 2019-02-04 ENCOUNTER — TELEPHONE (OUTPATIENT)
Dept: NEPHROLOGY | Facility: CLINIC | Age: 64
End: 2019-02-04

## 2019-02-04 VITALS
HEART RATE: 88 BPM | TEMPERATURE: 97.6 F | WEIGHT: 192 LBS | BODY MASS INDEX: 27.49 KG/M2 | DIASTOLIC BLOOD PRESSURE: 70 MMHG | HEIGHT: 70 IN | SYSTOLIC BLOOD PRESSURE: 124 MMHG

## 2019-02-04 DIAGNOSIS — J18.9 COMMUNITY ACQUIRED PNEUMONIA OF RIGHT UPPER LOBE OF LUNG: Primary | ICD-10-CM

## 2019-02-04 DIAGNOSIS — I10 HYPERTENSION, UNSPECIFIED TYPE: ICD-10-CM

## 2019-02-04 PROCEDURE — 99213 OFFICE O/P EST LOW 20 MIN: CPT | Performed by: FAMILY MEDICINE

## 2019-02-04 PROCEDURE — 3008F BODY MASS INDEX DOCD: CPT | Performed by: FAMILY MEDICINE

## 2019-02-04 RX ORDER — AZITHROMYCIN 250 MG/1
TABLET, FILM COATED ORAL
Qty: 6 TABLET | Refills: 0 | Status: SHIPPED | OUTPATIENT
Start: 2019-02-04 | End: 2019-02-09

## 2019-02-04 RX ORDER — ALBUTEROL SULFATE 90 UG/1
2 AEROSOL, METERED RESPIRATORY (INHALATION) EVERY 4 HOURS PRN
Qty: 1 INHALER | Refills: 0 | Status: SHIPPED | OUTPATIENT
Start: 2019-02-04 | End: 2019-03-06

## 2019-02-04 NOTE — PROGRESS NOTES
Assessment/Plan:    No problem-specific Assessment & Plan notes found for this encounter  Diagnoses and all orders for this visit:    Community acquired pneumonia of right upper lobe of lung ([facility] [address] )  Comments:  Has been several days  Recommend Zithromax, Ventolin  Follow-up in 2 weeks if needed  Orders:  -     azithromycin (ZITHROMAX) 250 mg tablet; Take 2 tablets on day 1, then 1 tablet daily days 2 through 5  -     albuterol (VENTOLIN HFA) 90 mcg/act inhaler; Inhale 2 puffs every 4 (four) hours as needed for shortness of breath for up to 30 days          Subjective:   C/o cold symptoms, chest congestion and cough for 1 week  Taking Mucinex DM and Tussin  Cough is worse at night  mjs     Patient ID: Manuel Mendoza is a 66 y o  male  He does not normally get colds, and has only had it for 3 days at a time before  With current it is 7 days, poor appetite, and weight loss  Using Tussin and Mucinex  Still has congestion in the chest with this  Coughing as well  The following portions of the patient's history were reviewed and updated as appropriate: allergies, current medications and problem list     Review of Systems   Constitutional: Positive for activity change and fatigue  Respiratory: Positive for cough and shortness of breath  Negative for wheezing  Genitourinary: Negative  Musculoskeletal: Negative  Objective:      /70 (Cuff Size: Large)   Pulse 88   Temp 97 6 °F (36 4 °C)   Ht 5' 10" (1 778 m)   Wt 87 1 kg (192 lb)   BMI 27 55 kg/m²          Physical Exam   Constitutional: He appears well-developed and well-nourished  HENT:   Head: Normocephalic and atraumatic  Neck: Normal range of motion  Neck supple  Cardiovascular: Normal rate, regular rhythm and normal heart sounds  Exam reveals no gallop and no friction rub  No murmur heard  Pulses:       Carotid pulses are 2+ on the right side, and 2+ on the left side    Pulmonary/Chest: Effort normal  No respiratory distress  He has no wheezes  He has no rales  Some egophony noted in the right upper lung  Dullness to percussion as well  No wheezes, no rhonchi  Nursing note and vitals reviewed

## 2019-02-04 NOTE — TELEPHONE ENCOUNTER
Eliel Goodrich called wanting to let Dr Samm Marion know that his pharmacy is not letting him  the prescription for lisinopril because they are saying that it is too early  He is taking 20 mg BID and would like to know what he can do to get his prescription

## 2019-02-04 NOTE — PATIENT INSTRUCTIONS
Problem List Items Addressed This Visit     None      Visit Diagnoses     Community acquired pneumonia of right upper lobe of lung ([facility] [address] )    -  Primary    Has been several days  Recommend Zithromax, Ventolin  Follow-up in 2 weeks if needed      Relevant Medications    azithromycin (ZITHROMAX) 250 mg tablet    albuterol (VENTOLIN HFA) 90 mcg/act inhaler

## 2019-02-04 NOTE — TELEPHONE ENCOUNTER
Patient called and said he ran out of Lisinopril 20mg BID too soon for a refill  Prescription was increased from once a day to twice a day at last appointment  Sending in a new prescription for the patient  Patient is aware

## 2019-02-04 NOTE — TELEPHONE ENCOUNTER
Hi Zac  This patients MRI of the prostate is denied with the insurance I scheduled a peer to peer to be done tomorrow 2/5/19 at 12:30pm, I will transfer the doctor to you  Thanks  Ansley Ruiz

## 2019-02-05 RX ORDER — LISINOPRIL 20 MG/1
20 TABLET ORAL 2 TIMES DAILY
Qty: 180 TABLET | Refills: 3 | Status: SHIPPED | OUTPATIENT
Start: 2019-02-05 | End: 2019-07-17 | Stop reason: SDUPTHER

## 2019-02-05 NOTE — TELEPHONE ENCOUNTER
MRI of prostate approved by Dr Mal Monsivais, as last prostate biopsy was performed within the year  Approval # [id number]  Patient will proceed with scheduled MRI of prostate on 2/8/19, and follow up with Dr Mario Alberto Vazquez on 3/4/19

## 2019-02-10 DIAGNOSIS — E78.2 MIXED HYPERLIPIDEMIA: Primary | ICD-10-CM

## 2019-02-10 RX ORDER — LOVASTATIN 40 MG/1
TABLET ORAL
Qty: 90 TABLET | Refills: 1 | Status: SHIPPED | OUTPATIENT
Start: 2019-02-10 | End: 2019-07-31 | Stop reason: SDUPTHER

## 2019-02-22 ENCOUNTER — TELEPHONE (OUTPATIENT)
Dept: UROLOGY | Facility: MEDICAL CENTER | Age: 64
End: 2019-02-22

## 2019-02-22 NOTE — TELEPHONE ENCOUNTER
[employer] from [employer] called  Patient needs authorization to go [facility]   [facility] [address]  [facility] to save a couple of hundred dollars  They have a pre certification already just need a name change to ricardo mckeon to receive order for MRI  Phone number for [address] [facility]  Please contact patient once order has been changed as well

## 2019-02-25 ENCOUNTER — TELEPHONE (OUTPATIENT)
Dept: UROLOGY | Facility: CLINIC | Age: 64
End: 2019-02-25

## 2019-02-25 NOTE — TELEPHONE ENCOUNTER
Patient needs to be rescheduled into discussion spot for MRI prostate results  Please assist with reschedule

## 2019-02-25 NOTE — TELEPHONE ENCOUNTER
Appt scheduled for 4/1/19 at 11:15 am in the [address] office with Dr Emily Ortiz  Please call pt with details

## 2019-03-02 LAB
ALBUMIN SERPL-MCNC: 4.2 G/DL (ref 3.6–5.1)
ALBUMIN/GLOB SERPL: 1.5 (CALC) (ref 1–2.5)
ALP SERPL-CCNC: 59 U/L (ref 40–115)
ALT SERPL-CCNC: 20 U/L (ref 9–46)
AST SERPL-CCNC: 19 U/L (ref 10–35)
BASOPHILS # BLD AUTO: 27 CELLS/UL (ref 0–200)
BASOPHILS NFR BLD AUTO: 0.4 %
BILIRUB SERPL-MCNC: 0.5 MG/DL (ref 0.2–1.2)
BUN SERPL-MCNC: 20 MG/DL (ref 7–25)
BUN/CREAT SERPL: 12 (CALC) (ref 6–22)
CALCIUM SERPL-MCNC: 10 MG/DL (ref 8.6–10.3)
CHLORIDE SERPL-SCNC: 105 MMOL/L (ref 98–110)
CHOLEST SERPL-MCNC: 165 MG/DL
CHOLEST/HDLC SERPL: 3.7 (CALC)
CO2 SERPL-SCNC: 28 MMOL/L (ref 20–32)
CREAT SERPL-MCNC: 1.63 MG/DL (ref 0.7–1.25)
EOSINOPHIL # BLD AUTO: 333 CELLS/UL (ref 15–500)
EOSINOPHIL NFR BLD AUTO: 4.9 %
ERYTHROCYTE [DISTWIDTH] IN BLOOD BY AUTOMATED COUNT: 13.8 % (ref 11–15)
GLOBULIN SER CALC-MCNC: 2.8 G/DL (CALC) (ref 1.9–3.7)
GLUCOSE SERPL-MCNC: 84 MG/DL (ref 65–99)
HCT VFR BLD AUTO: 49.2 % (ref 38.5–50)
HDLC SERPL-MCNC: 45 MG/DL
HGB BLD-MCNC: 15.8 G/DL (ref 13.2–17.1)
LDLC SERPL CALC-MCNC: 101 MG/DL (CALC)
LYMPHOCYTES # BLD AUTO: 1598 CELLS/UL (ref 850–3900)
LYMPHOCYTES NFR BLD AUTO: 23.5 %
MCH RBC QN AUTO: 26.2 PG (ref 27–33)
MCHC RBC AUTO-ENTMCNC: 32.1 G/DL (ref 32–36)
MCV RBC AUTO: 81.7 FL (ref 80–100)
MONOCYTES # BLD AUTO: 694 CELLS/UL (ref 200–950)
MONOCYTES NFR BLD AUTO: 10.2 %
NEUTROPHILS # BLD AUTO: 4148 CELLS/UL (ref 1500–7800)
NEUTROPHILS NFR BLD AUTO: 61 %
NONHDLC SERPL-MCNC: 120 MG/DL (CALC)
PLATELET # BLD AUTO: 208 THOUSAND/UL (ref 140–400)
PMV BLD REES-ECKER: 10 FL (ref 7.5–12.5)
POTASSIUM SERPL-SCNC: 4.4 MMOL/L (ref 3.5–5.3)
PROT SERPL-MCNC: 7 G/DL (ref 6.1–8.1)
PSA SERPL-MCNC: 4.3 NG/ML
RBC # BLD AUTO: 6.02 MILLION/UL (ref 4.2–5.8)
SL AMB EGFR AFRICAN AMERICAN: 52 ML/MIN/1.73M2
SL AMB EGFR NON AFRICAN AMERICAN: 45 ML/MIN/1.73M2
SODIUM SERPL-SCNC: 140 MMOL/L (ref 135–146)
TRIGL SERPL-MCNC: 95 MG/DL
TSH SERPL-ACNC: 1.68 MIU/L (ref 0.4–4.5)
WBC # BLD AUTO: 6.8 THOUSAND/UL (ref 3.8–10.8)

## 2019-03-05 ENCOUNTER — OFFICE VISIT (OUTPATIENT)
Dept: FAMILY MEDICINE CLINIC | Facility: CLINIC | Age: 64
End: 2019-03-05
Payer: COMMERCIAL

## 2019-03-05 VITALS
WEIGHT: 191 LBS | HEART RATE: 76 BPM | DIASTOLIC BLOOD PRESSURE: 80 MMHG | BODY MASS INDEX: 27.35 KG/M2 | TEMPERATURE: 98.6 F | SYSTOLIC BLOOD PRESSURE: 124 MMHG | HEIGHT: 70 IN

## 2019-03-05 DIAGNOSIS — J06.9 VIRAL UPPER RESPIRATORY TRACT INFECTION: ICD-10-CM

## 2019-03-05 DIAGNOSIS — R71.8 ABNORMAL RBC: Primary | ICD-10-CM

## 2019-03-05 DIAGNOSIS — N18.2 STAGE 2 CHRONIC KIDNEY DISEASE: ICD-10-CM

## 2019-03-05 DIAGNOSIS — Z12.11 SCREEN FOR COLON CANCER: ICD-10-CM

## 2019-03-05 DIAGNOSIS — E78.2 MIXED HYPERLIPIDEMIA: ICD-10-CM

## 2019-03-05 DIAGNOSIS — I10 ESSENTIAL HYPERTENSION: ICD-10-CM

## 2019-03-05 PROCEDURE — 99214 OFFICE O/P EST MOD 30 MIN: CPT | Performed by: FAMILY MEDICINE

## 2019-03-05 PROCEDURE — 3074F SYST BP LT 130 MM HG: CPT | Performed by: FAMILY MEDICINE

## 2019-03-05 PROCEDURE — 3079F DIAST BP 80-89 MM HG: CPT | Performed by: FAMILY MEDICINE

## 2019-03-05 PROCEDURE — 1036F TOBACCO NON-USER: CPT | Performed by: FAMILY MEDICINE

## 2019-03-05 NOTE — ASSESSMENT & PLAN NOTE
His cholesterol numbers are improved with the LDL coming down from 113 to 101 and he is on lovastatin 40 mg daily

## 2019-03-05 NOTE — ASSESSMENT & PLAN NOTE
He finished a course of Zithromax approximately 10 days ago and will start Coricidin HBP/cold and flu 1 tablet twice a day and 2 tablets at bedtime and Delsym 2 tsp twice a day for his cough  He will increase the usage of the Proventil inhaler to 2 puffs 4 times a day for his cough

## 2019-03-05 NOTE — PROGRESS NOTES
Assessment/Plan:  Follow-up regular visit in 6 months with labs  Call Friday if no better with a cough  Abnormal RBC  His RBC count remained the same at 6 0  A repeat CBC will be done in 6 months    Hyperlipidemia  His cholesterol numbers are improved with the LDL coming down from 113 to 101 and he is on lovastatin 40 mg daily  Hypertension  His blood pressure is controlled at 124/80 and he is on lisinopril 20 mg daily  Stage 2 chronic kidney disease  His creatinine went up from 1 5 to 1 6 and is under the care of Nephrology  Viral upper respiratory tract infection  He finished a course of Zithromax approximately 10 days ago and will start Coricidin HBP/cold and flu 1 tablet twice a day and 2 tablets at bedtime and Delsym 2 tsp twice a day for his cough  He will increase the usage of the Proventil inhaler to 2 puffs 4 times a day for his cough  Diagnoses and all orders for this visit:    Abnormal RBC  -     CBC and differential; Future  -     Comprehensive metabolic panel; Future  -     LDL cholesterol, direct; Future    Mixed hyperlipidemia  -     CBC and differential; Future  -     Comprehensive metabolic panel; Future  -     LDL cholesterol, direct; Future    Essential hypertension  -     CBC and differential; Future  -     Comprehensive metabolic panel; Future  -     LDL cholesterol, direct; Future    Stage 2 chronic kidney disease  -     CBC and differential; Future  -     Comprehensive metabolic panel; Future  -     LDL cholesterol, direct; Future    Screen for colon cancer  -     Occult Blood, Fecal Immunochemical; Future    Viral upper respiratory tract infection          Subjective: Follow up HTN, CKD, hyperlipidemia  Review BW results  Pt c/o congestion, productive cough x 2 weeks  He did complete a course of antibiotics over 10 days ago  Flu immunization declined, pneumonia vaccine declined  -  [facility]     Patient ID: Amanda Peña is a 66 y o  male      This is a 53-year-old male who comes in for his regular 6 month visit  He has been experiencing some head and chest congestion and a cough  Approximately 10 days ago he was put on Zithromax and Proventil and has been using the Proventil on a p r n  Basis  He denies any nausea, vomiting or diarrhea  His blood pressure is 124/80 and his temperature is 98 6°  His phlegm is clear in color where as before the Zithromax it was colored  His weight is down 1 lb from the previous visit to 191 lb and his BMI is 27 4  He knows he has to keep his weight off with diet and exercise  Reviewing his labs his PSA has gone back up from 3 7 to 4 3 and sees Dr Guy Calderon in April  His TSH is normal, his glucose is 84 and his creatinine went up from 1 5 to 1 6 and is under the care of Nephrology  His RBC count remained the same at 6 0  His cholesterol numbers are stable with the LDL coming down from 113 to 101  The following portions of the patient's history were reviewed and updated as appropriate: allergies, current medications, past family history, past medical history, past social history, past surgical history and problem list     Review of Systems   Constitutional: Negative for chills, fatigue and fever  HENT: Positive for congestion, postnasal drip and rhinorrhea  Negative for ear pain, sinus pressure and sore throat  Eyes: Negative  Respiratory: Positive for cough  Negative for chest tightness, shortness of breath and wheezing  Cardiovascular: Negative  Negative for chest pain  Gastrointestinal: Negative  Negative for diarrhea, nausea and vomiting  Endocrine: Negative  Genitourinary: Negative  Musculoskeletal: Negative  Skin: Negative  Allergic/Immunologic: Negative  Neurological: Negative  Hematological: Negative  Psychiatric/Behavioral: Negative            Objective:      /80 (BP Location: Left arm, Patient Position: Sitting, Cuff Size: Large)   Pulse 76   Temp 98 6 °F (37 °C) (Oral)   Ht 5' 10" (1 778 m)   Wt 86 6 kg (191 lb)   BMI 27 41 kg/m²          Physical Exam   Constitutional: He is oriented to person, place, and time  He appears well-developed and well-nourished  HENT:   Head: Normocephalic  Right Ear: External ear normal    Left Ear: External ear normal    Mouth/Throat: No oropharyngeal exudate  Positive postnasal drip, +1 erythema of posterior pharynx without exudates   Eyes: Pupils are equal, round, and reactive to light  Conjunctivae and EOM are normal    Neck: Normal range of motion  Neck supple  Cardiovascular: Normal rate, regular rhythm and normal heart sounds  Pulmonary/Chest: Effort normal and breath sounds normal  He has no wheezes  He has no rales  Abdominal: Soft  Bowel sounds are normal    Musculoskeletal: Normal range of motion  Lymphadenopathy:     He has no cervical adenopathy  Neurological: He is alert and oriented to person, place, and time  Skin: Skin is warm  Psychiatric: He has a normal mood and affect  His behavior is normal  Judgment and thought content normal    Nursing note and vitals reviewed

## 2019-03-13 ENCOUNTER — HOSPITAL ENCOUNTER (OUTPATIENT)
Dept: RADIOLOGY | Facility: HOSPITAL | Age: 64
Discharge: HOME | End: 2019-03-13
Attending: UROLOGY
Payer: COMMERCIAL

## 2019-03-13 DIAGNOSIS — C61 PROSTATE CA (HCC): ICD-10-CM

## 2019-03-13 PROCEDURE — 76377 3D RENDER W/INTRP POSTPROCES: CPT

## 2019-03-13 PROCEDURE — A9585 GADOBUTROL INJECTION: HCPCS | Performed by: UROLOGY

## 2019-03-13 PROCEDURE — 72197 MRI PELVIS W/O & W/DYE: CPT

## 2019-03-13 RX ADMIN — GADOBUTROL 9 ML: 604.72 INJECTION INTRAVENOUS at 20:32

## 2019-04-01 ENCOUNTER — OFFICE VISIT (OUTPATIENT)
Dept: UROLOGY | Facility: CLINIC | Age: 64
End: 2019-04-01
Payer: COMMERCIAL

## 2019-04-01 VITALS
SYSTOLIC BLOOD PRESSURE: 124 MMHG | BODY MASS INDEX: 28.41 KG/M2 | DIASTOLIC BLOOD PRESSURE: 80 MMHG | HEART RATE: 80 BPM | WEIGHT: 198 LBS

## 2019-04-01 DIAGNOSIS — C61 PROSTATE CANCER (HCC): Primary | ICD-10-CM

## 2019-04-01 PROCEDURE — 99213 OFFICE O/P EST LOW 20 MIN: CPT | Performed by: UROLOGY

## 2019-04-03 ENCOUNTER — OFFICE VISIT (OUTPATIENT)
Dept: FAMILY MEDICINE CLINIC | Facility: CLINIC | Age: 64
End: 2019-04-03
Payer: COMMERCIAL

## 2019-04-03 VITALS
BODY MASS INDEX: 28.06 KG/M2 | HEART RATE: 78 BPM | DIASTOLIC BLOOD PRESSURE: 76 MMHG | HEIGHT: 70 IN | WEIGHT: 196 LBS | SYSTOLIC BLOOD PRESSURE: 140 MMHG

## 2019-04-03 DIAGNOSIS — S61.213A LACERATION OF LEFT MIDDLE FINGER WITHOUT FOREIGN BODY WITHOUT DAMAGE TO NAIL, INITIAL ENCOUNTER: Primary | ICD-10-CM

## 2019-04-03 PROCEDURE — 99212 OFFICE O/P EST SF 10 MIN: CPT | Performed by: FAMILY MEDICINE

## 2019-04-03 PROCEDURE — 1036F TOBACCO NON-USER: CPT | Performed by: FAMILY MEDICINE

## 2019-04-03 PROCEDURE — 3008F BODY MASS INDEX DOCD: CPT | Performed by: FAMILY MEDICINE

## 2019-06-03 DIAGNOSIS — I10 HYPERTENSION, UNSPECIFIED TYPE: ICD-10-CM

## 2019-06-03 RX ORDER — LISINOPRIL 20 MG/1
TABLET ORAL
Qty: 90 TABLET | Refills: 1 | Status: SHIPPED | OUTPATIENT
Start: 2019-06-03 | End: 2019-07-17 | Stop reason: SDUPTHER

## 2019-07-09 LAB — PSA SERPL-MCNC: 3.7 NG/ML

## 2019-07-10 LAB
ALBUMIN SERPL ELPH-MCNC: 4.2 G/DL (ref 3.8–4.8)
ALBUMIN SERPL-MCNC: 4.2 G/DL (ref 3.6–5.1)
ALBUMIN/GLOB SERPL: 1.5 (CALC) (ref 1–2.5)
ALP SERPL-CCNC: 55 U/L (ref 40–115)
ALPHA1 GLOB SERPL ELPH-MCNC: 0.3 G/DL (ref 0.2–0.3)
ALPHA2 GLOB SERPL ELPH-MCNC: 0.6 G/DL (ref 0.5–0.9)
ALT SERPL-CCNC: 15 U/L (ref 9–46)
APPEARANCE UR: ABNORMAL
AST SERPL-CCNC: 16 U/L (ref 10–35)
BACTERIA UR QL AUTO: ABNORMAL /HPF
BETA1 GLOB SERPL ELPH-MCNC: 0.4 G/DL (ref 0.4–0.6)
BETA2 GLOB SERPL ELPH-MCNC: 0.4 G/DL (ref 0.2–0.5)
BILIRUB SERPL-MCNC: 0.5 MG/DL (ref 0.2–1.2)
BILIRUB UR QL STRIP: NEGATIVE
BUN SERPL-MCNC: 20 MG/DL (ref 7–25)
BUN/CREAT SERPL: 13 (CALC) (ref 6–22)
CALCIUM SERPL-MCNC: 10 MG/DL (ref 8.6–10.3)
CALCIUM SERPL-MCNC: 10 MG/DL (ref 8.6–10.3)
CHLORIDE SERPL-SCNC: 105 MMOL/L (ref 98–110)
CO2 SERPL-SCNC: 26 MMOL/L (ref 20–32)
COLOR UR: YELLOW
CREAT SERPL-MCNC: 1.52 MG/DL (ref 0.7–1.25)
GAMMA GLOB SERPL ELPH-MCNC: 1.2 G/DL (ref 0.8–1.7)
GLOBULIN SER CALC-MCNC: 2.8 G/DL (CALC) (ref 1.9–3.7)
GLUCOSE SERPL-MCNC: 82 MG/DL (ref 65–99)
GLUCOSE UR QL STRIP: NEGATIVE
HGB UR QL STRIP: ABNORMAL
HYALINE CASTS #/AREA URNS LPF: ABNORMAL /LPF
KETONES UR QL STRIP: NEGATIVE
LEUKOCYTE ESTERASE UR QL STRIP: NEGATIVE
M PROTEIN 1 SERPL ELPH-MCNC: 0.3 G/DL
MAGNESIUM SERPL-MCNC: 1.8 MG/DL (ref 1.5–2.5)
NITRITE UR QL STRIP: NEGATIVE
PH UR STRIP: ABNORMAL [PH] (ref 5–8)
PHOSPHATE SERPL-MCNC: 3.6 MG/DL (ref 2.5–4.5)
POTASSIUM SERPL-SCNC: 4.4 MMOL/L (ref 3.5–5.3)
PROT SERPL-MCNC: 7 G/DL (ref 6.1–8.1)
PROT SERPL-MCNC: 7 G/DL (ref 6.1–8.1)
PROT UR QL STRIP: ABNORMAL
PTH-INTACT SERPL-MCNC: 77 PG/ML (ref 14–64)
RBC #/AREA URNS HPF: ABNORMAL /HPF
SL AMB EGFR AFRICAN AMERICAN: 56 ML/MIN/1.73M2
SL AMB EGFR NON AFRICAN AMERICAN: 49 ML/MIN/1.73M2
SODIUM SERPL-SCNC: 138 MMOL/L (ref 135–146)
SP GR UR STRIP: 1.01 (ref 1–1.03)
SQUAMOUS #/AREA URNS HPF: ABNORMAL /HPF
WBC #/AREA URNS HPF: ABNORMAL /HPF

## 2019-07-17 ENCOUNTER — OFFICE VISIT (OUTPATIENT)
Dept: NEPHROLOGY | Facility: CLINIC | Age: 64
End: 2019-07-17
Payer: COMMERCIAL

## 2019-07-17 VITALS
HEIGHT: 70 IN | BODY MASS INDEX: 27.92 KG/M2 | SYSTOLIC BLOOD PRESSURE: 136 MMHG | RESPIRATION RATE: 18 BRPM | DIASTOLIC BLOOD PRESSURE: 90 MMHG | HEART RATE: 70 BPM | WEIGHT: 195 LBS

## 2019-07-17 DIAGNOSIS — E34.9 ELEVATED PARATHYROID HORMONE: ICD-10-CM

## 2019-07-17 DIAGNOSIS — R31.21 ASYMPTOMATIC MICROSCOPIC HEMATURIA: ICD-10-CM

## 2019-07-17 DIAGNOSIS — I10 HYPERTENSION, UNSPECIFIED TYPE: ICD-10-CM

## 2019-07-17 DIAGNOSIS — N18.2 STAGE 2 CHRONIC KIDNEY DISEASE: Primary | ICD-10-CM

## 2019-07-17 DIAGNOSIS — I10 ESSENTIAL HYPERTENSION: ICD-10-CM

## 2019-07-17 DIAGNOSIS — R80.9 PROTEINURIA, UNSPECIFIED TYPE: ICD-10-CM

## 2019-07-17 DIAGNOSIS — E83.52 HYPERCALCEMIA: ICD-10-CM

## 2019-07-17 PROCEDURE — 99213 OFFICE O/P EST LOW 20 MIN: CPT | Performed by: INTERNAL MEDICINE

## 2019-07-17 RX ORDER — LISINOPRIL 20 MG/1
20 TABLET ORAL 2 TIMES DAILY
Qty: 180 TABLET | Refills: 3 | Status: SHIPPED | OUTPATIENT
Start: 2019-07-17 | End: 2020-05-15

## 2019-07-17 NOTE — PATIENT INSTRUCTIONS
1  Chronic kidney disease stage 2 in setting of prostatic adenocarcinoma - eGFR ~70s ml/min per CKD EPI equation  Last sCr 1 52 (stable)  -continue to avoid nonsteroidals(motrin, aleve, advil, ibuprofen, toradol, naproxen, celebrex, indomethacin and meloxicam)  -very low risk of progression to End stage disease   -renal u/s August 2017 shows echogenic kidneys with b/l renal cysts  The cause of this is unclear  +protein in urine   -Check BMP in 6 months      2  HTN - diastolic BP a bit above goal in office today  Have correlated home BP cuff in past which appears to be accurate  Continue low salt diet   -continue lisinopril 20mg twice daily  No longer on amlodipine 5mg daily  -Goal BP < 140/90  Call office if BP at home persistently above goal       3  Proteinuria - noted on UA  UpCr 455mg/g, slightly higher than prior  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  F/u repeat UpCr  UA shows 1+ protein       4  Prostate cancer - Undergoing active surveillance by urology  Last PSA 3 7 - stable     5  microscopic hematuria - does not appear to be glomerular in origin and UA shows only 0-2 RBCs as of 11/2017  Repeat UA with microscopy showed 2+ blood, 1+ protein with only 0-2 RBCs  -repeat urinalysis with microscopy shows 0-2 RBCs again  -May have underlying TBM vs IgAN but without significant proteinuria, no need for renal biopsy       6  Hypercalcemia  - uncorrected calcium 10 as of 7/8/19  Phos/mag normal  PTH a bit high at 77  Will check vitamin D  Recent SPEP shows IgG kappa monoclonal band  There was a faint band in the past on SPEP dating back to 2017  Do not see immunofixation results  Will repeat SPEP with IF      RTC in 6 months

## 2019-07-17 NOTE — PROGRESS NOTES
NEPHROLOGY OUTPATIENT PROGRESS NOTE   Lindsey Mera 66 y o  male MRN: [id number]  DATE: 7/17/2019  Reason for visit:   Chief Complaint   Patient presents with    Follow-up    Chronic Kidney Disease        Patient Instructions   1  Chronic kidney disease stage 2 in setting of prostatic adenocarcinoma - eGFR ~70s ml/min per CKD EPI equation  Last sCr 1 52 (stable)  -continue to avoid nonsteroidals(motrin, aleve, advil, ibuprofen, toradol, naproxen, celebrex, indomethacin and meloxicam)  -very low risk of progression to End stage disease   -renal u/s August 2017 shows echogenic kidneys with b/l renal cysts  The cause of this is unclear  +protein in urine   -Check BMP in 6 months      2  HTN - diastolic BP a bit above goal in office today  Have correlated home BP cuff in past which appears to be accurate  Continue low salt diet   -continue lisinopril 20mg twice daily  No longer on amlodipine 5mg daily  -Goal BP < 140/90  Call office if BP at home persistently above goal       3  Proteinuria - noted on UA  UpCr 455mg/g, slightly higher than prior  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  F/u repeat UpCr  UA shows 1+ protein       4  Prostate cancer - Undergoing active surveillance by urology  Last PSA 3 7 - stable     5  microscopic hematuria - does not appear to be glomerular in origin and UA shows only 0-2 RBCs as of 11/2017  Repeat UA with microscopy showed 2+ blood, 1+ protein with only 0-2 RBCs  -repeat urinalysis with microscopy shows 0-2 RBCs again  -May have underlying TBM vs IgAN but without significant proteinuria, no need for renal biopsy       6  Hypercalcemia  - uncorrected calcium 10 as of 7/8/19  Phos/mag normal  PTH a bit high at 77  Will check vitamin D  Recent SPEP shows IgG kappa monoclonal band  There was a faint band in the past on SPEP dating back to 2017  Do not see immunofixation results  Will repeat SPEP with IF      RTC in 6 months           Michelle De Oliveira was seen today for follow-up and chronic kidney disease  Diagnoses and all orders for this visit:    Stage 2 chronic kidney disease  -     Comprehensive metabolic panel; Future    Essential hypertension    Asymptomatic microscopic hematuria    Proteinuria, unspecified type  -     Immunofixation IgA,IgG,IgM Qt  Immunofixation Serum Immunoglobulin; Future  -     Protein / creatinine ratio, urine; Future    Hypercalcemia  -     Immunofixation IgA,IgG,IgM Qt  Immunofixation Serum Immunoglobulin; Future  -     Protein electrophoresis, serum; Future    Elevated parathyroid hormone  -     Vitamin D 25 hydroxy; Future    Hypertension, unspecified type  -     lisinopril (ZESTRIL) 20 mg tablet; Take 1 tablet (20 mg total) by mouth 2 (two) times a day        Assessment/Plan:  1  Chronic kidney disease stage 2 in setting of prostatic adenocarcinoma - eGFR ~70s ml/min per CKD EPI equation  Last sCr 1 52 (stable)  -continue to avoid nonsteroidals(motrin, aleve, advil, ibuprofen, toradol, naproxen, celebrex, indomethacin and meloxicam)  -very low risk of progression to End stage disease   -renal u/s August 2017 shows echogenic kidneys with b/l renal cysts  The cause of this is unclear  +protein in urine   -Check BMP in 6 months      2  HTN - diastolic BP a bit above goal in office today  Have correlated home BP cuff in past which appears to be accurate  Continue low salt diet   -continue lisinopril 20mg twice daily  No longer on amlodipine 5mg daily  -Goal BP < 140/90  Call office if BP at home persistently above goal       3  Proteinuria - noted on UA  UpCr 455mg/g, slightly higher than prior  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  F/u repeat UpCr  UA shows 1+ protein       4  Prostate cancer - Undergoing active surveillance by urology  Last PSA 3 7 - stable     5  microscopic hematuria - does not appear to be glomerular in origin and UA shows only 0-2 RBCs as of 11/2017   Repeat UA with microscopy showed 2+ blood, 1+ protein with only 0-2 RBCs  -repeat urinalysis with microscopy shows 0-2 RBCs again  -May have underlying TBM vs IgAN but without significant proteinuria, no need for renal biopsy       6  Hypercalcemia  - uncorrected calcium 10 as of 7/8/19  Phos/mag normal  PTH a bit high at 77  Will check vitamin D  Recent SPEP shows IgG kappa monoclonal band  There was a faint band in the past on SPEP dating back to 2017  Do not see immunofixation results  Will repeat SPEP with IF      RTC in 6 months  SUBJECTIVE / INTERVAL HISTORY:  66 y o  male presents in follow up of CKD  Malsam Vega denies any recent illness/hospitalizations/medication changes since last office visit  Denies NSAID use  BP at home has been labile  Sometimes the BP will be 057-123 systolic  Stays well hydrated  Review of Systems   Constitutional: Negative for chills and fever  HENT: Negative for sore throat  Eyes: Negative for visual disturbance  Respiratory: Negative for cough and shortness of breath  Cardiovascular: Negative for chest pain and leg swelling  Gastrointestinal: Negative for abdominal pain, constipation, diarrhea, nausea and vomiting  Endocrine: Negative for polyuria  Genitourinary: Negative for decreased urine volume, difficulty urinating, dysuria and hematuria  Musculoskeletal: Negative for back pain and myalgias  Skin: Negative for rash  Neurological: Negative for dizziness, light-headedness and numbness  Psychiatric/Behavioral: Negative for confusion  OBJECTIVE:  /90   Pulse 70   Resp 18   Ht 5' 10" (1 778 m)   Wt 88 5 kg (195 lb)   BMI 27 98 kg/m²  Body mass index is 27 98 kg/m²  Physical exam:  Physical Exam   Constitutional: He appears well-developed and well-nourished  No distress  HENT:   Head: Normocephalic and atraumatic  Mouth/Throat: No oropharyngeal exudate  Eyes: Right eye exhibits no discharge  Left eye exhibits no discharge  No scleral icterus     Neck: Neck supple  Cardiovascular: Normal rate, regular rhythm and normal heart sounds  Pulmonary/Chest: Effort normal and breath sounds normal  He has no wheezes  He has no rales  Abdominal: Soft  Bowel sounds are normal  He exhibits no distension  There is no tenderness  Musculoskeletal: Normal range of motion  He exhibits no edema  Neurological: He is alert  awake   Skin: Skin is warm and dry  No rash noted  He is not diaphoretic  Psychiatric: He has a normal mood and affect  His behavior is normal    Vitals reviewed  Medications:    Current Outpatient Medications:     ENSTILAR 0 005-0 064 % FOAM, APPLY TO SKIN ONCE DAILY  , Disp: , Rfl: 6    lisinopril (ZESTRIL) 20 mg tablet, Take 1 tablet (20 mg total) by mouth 2 (two) times a day, Disp: 180 tablet, Rfl: 3    lovastatin (MEVACOR) 40 MG tablet, TAKE 1 TABLET DAILY, Disp: 90 tablet, Rfl: 1    Allergies:   Allergies as of 07/17/2019 - Reviewed 07/17/2019   Allergen Reaction Noted    Amlodipine Hives 07/23/2018    Penicillins Hives 08/29/2017    Fenofibrate Itching and Rash 07/10/2015       The following portions of the patient's history were reviewed and updated as appropriate: past family history, past surgical history and problem list     Laboratory Results:  Lab Results   Component Value Date    SODIUM 138 07/08/2019    K 4 4 07/08/2019     07/08/2019    CO2 26 07/08/2019    BUN 20 07/08/2019    CREATININE 1 52 (H) 07/08/2019    GLUC 82 07/08/2019    CALCIUM 10 0 07/08/2019    CALCIUM 10 0 07/08/2019        Lab Results   Component Value Date    PTH 49 11/30/2017    PTH 49 11/30/2017    PTH 49 11/30/2017    PTH 49 11/30/2017    CALCIUM 10 0 07/08/2019    CALCIUM 10 0 07/08/2019    CAION 5 8 (H) 11/30/2017    CAION 5 8 (H) 11/30/2017    CAION 5 8 (H) 11/30/2017    CAION 5 8 (H) 11/30/2017    PHOS 2 9 11/30/2017    PHOS 2 9 11/30/2017    PHOS 2 9 11/30/2017    PHOS 2 9 11/30/2017       Portions of the record may have been created with voice recognition software   Occasional wrong word or "sound a like" substitutions may have occurred due to the inherent limitations of voice recognition software   Read the chart carefully and recognize, using context, where substitutions have occurred

## 2019-07-31 DIAGNOSIS — E78.2 MIXED HYPERLIPIDEMIA: ICD-10-CM

## 2019-07-31 RX ORDER — LOVASTATIN 40 MG/1
TABLET ORAL
Qty: 90 TABLET | Refills: 1 | Status: SHIPPED | OUTPATIENT
Start: 2019-07-31 | End: 2020-01-19

## 2019-08-08 NOTE — PROGRESS NOTES
8/9/2019      Chief Complaint   Patient presents with    Follow-up    Prostate Cancer       Assessment and Plan    66 y o  male managed by Dr Oscar Tejada    1  Small volume Deanna 6 prostate cancer on active surveillance  - PSA 3 7 (7/8/19), 4 3 (3/1/19), 3 7 (1/7/19), 4 2 (6/25/18)  - PSA very stable and MRI revealing PI-RADs 1  - recheck PSA 6 months     FU 6 months with PSA prior and JOSEPH at visit       History of Present Illness  Rodrigo Vela is a 66 y o  male here for follow up evaluation of small volume Deanna 6 prostate cancer diagnosed on an initial prostate biopsy in 2017  In 2018 a surveillance biopsy again showed 3 of 12 cores with Deanna 6 prostate cancer  He underwent multiparametric MRI of the prostate 3/13/19 which reveals PI-RADs 1  His most recent PSA is stable at 3 7 (7/8/19)  He has no LUTs, listed below  Review of Systems   Constitutional: Negative for activity change, chills and fever  Gastrointestinal: Negative for abdominal distention and abdominal pain  Musculoskeletal: Negative for back pain and gait problem  Psychiatric/Behavioral: Negative for behavioral problems and confusion  Urinary Incontinence Screening      Most Recent Value   Urinary Incontinence   Urinary Incontinence? No   Incomplete emptying? No   Urinary frequency? No   Urinary urgency? No   Urinary hesitancy? No   Dysuria (painful difficult urination)? No   Nocturia (waking up to use the bathroom)? Yes [2-3x]   Straining (having to push to go)?   No   Weak stream?  No   Intermittent stream?  No          Past Medical History  Past Medical History:   Diagnosis Date    Adenocarcinoma of prostate ([facility] [address] )     Last assessed 08/08/17    Chronic kidney disease     Colon polyp     Hyperlipidemia     Hypertension        Past Social History  Past Surgical History:   Procedure Laterality Date    ID COLONOSCOPY FLX DX W/COLLJ SPEC WHEN PFRMD N/A 8/31/2018    Procedure: COLONOSCOPY;  Surgeon: Rose Mary Delgado MD; Location: [facility]; Service: Colorectal    PROSTATE BIOPSY  02/16/2018    TONSILLECTOMY       Social History     Tobacco Use   Smoking Status Never Smoker   Smokeless Tobacco Never Used       Past Family History  Family History   Problem Relation Age of Onset    Arthritis Mother     Hyperlipidemia Mother     Hypertension Mother     Diabetes Son        Past Social history  Social History     Socioeconomic History    Marital status: /Civil Union     Spouse name: Not on file    Number of children: Not on file    Years of education: Not on file    Highest education level: Not on file   Occupational History    Occupation:  retired IT and finance   Social Needs    Financial resource strain: Not on file    Food insecurity:     Worry: Not on file     Inability: Not on file   [employer] needs:     Medical: Not on file     Non-medical: Not on file   Tobacco Use    Smoking status: Never Smoker    Smokeless tobacco: Never Used   Substance and Sexual Activity    Alcohol use: Yes     Comment: very rarely    Drug use: No    Sexual activity: Not on file   Lifestyle    Physical activity:     Days per week: Not on file     Minutes per session: Not on file    Stress: Not on file   Relationships    Social connections:     Talks on phone: Not on file     Gets together: Not on file     Attends Yazdanism service: Not on file     Active member of club or organization: Not on file     Attends meetings of clubs or organizations: Not on file     Relationship status: Not on file    Intimate partner violence:     Fear of current or ex partner: Not on file     Emotionally abused: Not on file     Physically abused: Not on file     Forced sexual activity: Not on file   Other Topics Concern    Not on file   Social History Narrative    Not on file       Current Medications  Current Outpatient Medications   Medication Sig Dispense Refill    ENSTILAR 0 005-0 064 % FOAM APPLY TO SKIN ONCE DAILY    6    lisinopril (ZESTRIL) 20 mg tablet Take 1 tablet (20 mg total) by mouth 2 (two) times a day 180 tablet 3    lovastatin (MEVACOR) 40 MG tablet TAKE 1 TABLET DAILY 90 tablet 1     No current facility-administered medications for this visit  Allergies  Allergies   Allergen Reactions    Amlodipine Hives    Penicillins Hives    Fenofibrate Itching and Rash         The following portions of the patient's history were reviewed and updated as appropriate: allergies, current medications, past medical history, past social history, past surgical history and problem list       Vitals  There were no vitals filed for this visit  Physical Exam  Constitutional   General appearance: Patient is seated and in no acute distress, well appearing and well nourished  Head and Face   Head and face: Normal     Eyes   Conjunctiva and lids: No erythema, swelling or discharge  Ears, Nose, Mouth, and Throat   Hearing: Normal     Pulmonary   Respiratory effort: No increased work of breathing or signs of respiratory distress  Cardiovascular   Examination of extremities for edema and/or varicosities: Normal     Abdomen   Abdomen: Non-tender, no masses  Musculoskeletal   Gait and station: Normal     Skin   Skin and subcutaneous tissue: Warm, dry, and intact  No visible lesions or rashes    Psychiatric   Judgment and insight: Normal  Recent and remote memory:  Normal  Mood and affect: Normal      Results  No results found for this or any previous visit (from the past 1 hour(s)) ]  Lab Results   Component Value Date    PSA 3 7 07/08/2019    PSA 4 3 (H) 03/01/2019    PSA 3 7 01/07/2019     Lab Results   Component Value Date    CALCIUM 10 0 07/08/2019    CALCIUM 10 0 07/08/2019     11/30/2017     11/30/2017     11/30/2017     11/30/2017    K 4 4 07/08/2019    CO2 26 07/08/2019     07/08/2019    BUN 20 07/08/2019    CREATININE 1 52 (H) 07/08/2019     Lab Results   Component Value Date    WBC 6 8 03/01/2019 HGB 15 8 03/01/2019    HCT 49 2 03/01/2019    MCV 81 7 03/01/2019     03/01/2019       Orders  Orders Placed This Encounter   Procedures    PSA Total, Diagnostic     Standing Status:   Future     Standing Expiration Date:   8/9/2020

## 2019-08-09 ENCOUNTER — OFFICE VISIT (OUTPATIENT)
Dept: UROLOGY | Facility: CLINIC | Age: 64
End: 2019-08-09
Payer: COMMERCIAL

## 2019-08-09 VITALS
HEART RATE: 78 BPM | HEIGHT: 70 IN | SYSTOLIC BLOOD PRESSURE: 154 MMHG | BODY MASS INDEX: 28.37 KG/M2 | DIASTOLIC BLOOD PRESSURE: 86 MMHG | WEIGHT: 198.2 LBS

## 2019-08-09 DIAGNOSIS — C61 ADENOCARCINOMA OF PROSTATE (HCC): Primary | ICD-10-CM

## 2019-08-09 PROCEDURE — 99213 OFFICE O/P EST LOW 20 MIN: CPT | Performed by: PHYSICIAN ASSISTANT

## 2019-09-05 ENCOUNTER — TELEPHONE (OUTPATIENT)
Dept: NEPHROLOGY | Facility: HOSPITAL | Age: 64
End: 2019-09-05

## 2019-09-05 LAB
25(OH)D3 SERPL-MCNC: 23 NG/ML (ref 30–100)
ALBUMIN SERPL ELPH-MCNC: 4 G/DL (ref 3.8–4.8)
ALBUMIN SERPL-MCNC: 4.1 G/DL (ref 3.6–5.1)
ALBUMIN SERPL-MCNC: 4.1 G/DL (ref 3.6–5.1)
ALBUMIN/GLOB SERPL: 1.4 (CALC) (ref 1–2.5)
ALBUMIN/GLOB SERPL: 1.5 (CALC) (ref 1–2.5)
ALP SERPL-CCNC: 53 U/L (ref 40–115)
ALP SERPL-CCNC: 55 U/L (ref 40–115)
ALPHA1 GLOB SERPL ELPH-MCNC: 0.3 G/DL (ref 0.2–0.3)
ALPHA2 GLOB SERPL ELPH-MCNC: 0.6 G/DL (ref 0.5–0.9)
ALT SERPL-CCNC: 23 U/L (ref 9–46)
ALT SERPL-CCNC: 23 U/L (ref 9–46)
AST SERPL-CCNC: 21 U/L (ref 10–35)
AST SERPL-CCNC: 22 U/L (ref 10–35)
BASOPHILS # BLD AUTO: 33 CELLS/UL (ref 0–200)
BASOPHILS NFR BLD AUTO: 0.4 %
BETA1 GLOB SERPL ELPH-MCNC: 0.4 G/DL (ref 0.4–0.6)
BETA2 GLOB SERPL ELPH-MCNC: 0.4 G/DL (ref 0.2–0.5)
BILIRUB SERPL-MCNC: 0.5 MG/DL (ref 0.2–1.2)
BILIRUB SERPL-MCNC: 0.5 MG/DL (ref 0.2–1.2)
BUN SERPL-MCNC: 24 MG/DL (ref 7–25)
BUN SERPL-MCNC: 24 MG/DL (ref 7–25)
BUN/CREAT SERPL: 15 (CALC) (ref 6–22)
BUN/CREAT SERPL: 15 (CALC) (ref 6–22)
CALCIUM SERPL-MCNC: 10 MG/DL (ref 8.6–10.3)
CALCIUM SERPL-MCNC: 9.9 MG/DL (ref 8.6–10.3)
CHLORIDE SERPL-SCNC: 104 MMOL/L (ref 98–110)
CHLORIDE SERPL-SCNC: 105 MMOL/L (ref 98–110)
CO2 SERPL-SCNC: 23 MMOL/L (ref 20–32)
CO2 SERPL-SCNC: 26 MMOL/L (ref 20–32)
CREAT SERPL-MCNC: 1.61 MG/DL (ref 0.7–1.25)
CREAT SERPL-MCNC: 1.61 MG/DL (ref 0.7–1.25)
CREAT UR-MCNC: 130 MG/DL (ref 20–320)
EOSINOPHIL # BLD AUTO: 451 CELLS/UL (ref 15–500)
EOSINOPHIL NFR BLD AUTO: 5.5 %
ERYTHROCYTE [DISTWIDTH] IN BLOOD BY AUTOMATED COUNT: 13.8 % (ref 11–15)
GAMMA GLOB SERPL ELPH-MCNC: 1.2 G/DL (ref 0.8–1.7)
GLOBULIN SER CALC-MCNC: 2.8 G/DL (CALC) (ref 1.9–3.7)
GLOBULIN SER CALC-MCNC: 2.9 G/DL (CALC) (ref 1.9–3.7)
GLUCOSE SERPL-MCNC: 80 MG/DL (ref 65–99)
GLUCOSE SERPL-MCNC: 84 MG/DL (ref 65–99)
HCT VFR BLD AUTO: 51.2 % (ref 38.5–50)
HGB BLD-MCNC: 16.3 G/DL (ref 13.2–17.1)
IGA SERPL-MCNC: 264 MG/DL (ref 20–320)
IGG SERPL-MCNC: 1274 MG/DL (ref 600–1540)
IGM SERPL-MCNC: 76 MG/DL (ref 50–300)
LDLC SERPL DIRECT ASSAY-MCNC: 106 MG/DL
LYMPHOCYTES # BLD AUTO: 1845 CELLS/UL (ref 850–3900)
LYMPHOCYTES NFR BLD AUTO: 22.5 %
M PROTEIN 1 SERPL ELPH-MCNC: 0.2 G/DL
MCH RBC QN AUTO: 26.1 PG (ref 27–33)
MCHC RBC AUTO-ENTMCNC: 31.8 G/DL (ref 32–36)
MCV RBC AUTO: 82.1 FL (ref 80–100)
MONOCYTES # BLD AUTO: 754 CELLS/UL (ref 200–950)
MONOCYTES NFR BLD AUTO: 9.2 %
NEUTROPHILS # BLD AUTO: 5117 CELLS/UL (ref 1500–7800)
NEUTROPHILS NFR BLD AUTO: 62.4 %
PLATELET # BLD AUTO: 191 THOUSAND/UL (ref 140–400)
PMV BLD REES-ECKER: 10.2 FL (ref 7.5–12.5)
POTASSIUM SERPL-SCNC: 4.2 MMOL/L (ref 3.5–5.3)
POTASSIUM SERPL-SCNC: 4.3 MMOL/L (ref 3.5–5.3)
PROT SERPL-MCNC: 6.9 G/DL (ref 6.1–8.1)
PROT SERPL-MCNC: 6.9 G/DL (ref 6.1–8.1)
PROT SERPL-MCNC: 7 G/DL (ref 6.1–8.1)
PROT UR-MCNC: 54 MG/DL (ref 5–25)
PROT/CREAT UR: 415 MG/G CREAT (ref 22–128)
RBC # BLD AUTO: 6.24 MILLION/UL (ref 4.2–5.8)
SL AMB EGFR AFRICAN AMERICAN: 53 ML/MIN/1.73M2
SL AMB EGFR AFRICAN AMERICAN: 53 ML/MIN/1.73M2
SL AMB EGFR NON AFRICAN AMERICAN: 45 ML/MIN/1.73M2
SL AMB EGFR NON AFRICAN AMERICAN: 45 ML/MIN/1.73M2
SODIUM SERPL-SCNC: 138 MMOL/L (ref 135–146)
SODIUM SERPL-SCNC: 139 MMOL/L (ref 135–146)
WBC # BLD AUTO: 8.2 THOUSAND/UL (ref 3.8–10.8)

## 2019-09-05 NOTE — TELEPHONE ENCOUNTER
----- Message from [place], [place] sent at 9/5/2019  2:04 PM EDT -----  Please let patient know vitamin d level is slightly low  He can take cholecalciferol 1000 units daily WITHOUT calcium supplement (calcium level on higher side)  Other labs stable

## 2019-09-06 NOTE — TELEPHONE ENCOUNTER
I spoke to the patient and he is aware he needs to start the Vitamin D supplement of 1000iu daily  I explained do not take one that combines the Vit D and the calcium  No further questions or concerns

## 2019-09-13 ENCOUNTER — OFFICE VISIT (OUTPATIENT)
Dept: FAMILY MEDICINE CLINIC | Facility: CLINIC | Age: 64
End: 2019-09-13
Payer: COMMERCIAL

## 2019-09-13 VITALS
BODY MASS INDEX: 28.35 KG/M2 | HEIGHT: 70 IN | HEART RATE: 76 BPM | WEIGHT: 198 LBS | DIASTOLIC BLOOD PRESSURE: 78 MMHG | SYSTOLIC BLOOD PRESSURE: 124 MMHG

## 2019-09-13 DIAGNOSIS — Z12.5 SCREENING FOR PROSTATE CANCER: ICD-10-CM

## 2019-09-13 DIAGNOSIS — N18.2 STAGE 2 CHRONIC KIDNEY DISEASE: ICD-10-CM

## 2019-09-13 DIAGNOSIS — I10 ESSENTIAL HYPERTENSION: Primary | ICD-10-CM

## 2019-09-13 DIAGNOSIS — Z12.11 SCREEN FOR COLON CANCER: ICD-10-CM

## 2019-09-13 DIAGNOSIS — E55.9 VITAMIN D DEFICIENCY: ICD-10-CM

## 2019-09-13 DIAGNOSIS — E78.2 MIXED HYPERLIPIDEMIA: ICD-10-CM

## 2019-09-13 LAB — HEMOCCULT STL QL IA: NOT DETECTED

## 2019-09-13 PROCEDURE — 3074F SYST BP LT 130 MM HG: CPT | Performed by: FAMILY MEDICINE

## 2019-09-13 PROCEDURE — 3008F BODY MASS INDEX DOCD: CPT | Performed by: FAMILY MEDICINE

## 2019-09-13 PROCEDURE — 3078F DIAST BP <80 MM HG: CPT | Performed by: FAMILY MEDICINE

## 2019-09-13 PROCEDURE — 99214 OFFICE O/P EST MOD 30 MIN: CPT | Performed by: FAMILY MEDICINE

## 2019-09-13 RX ORDER — MELATONIN
1000 DAILY
Start: 2019-09-13

## 2019-09-13 NOTE — PROGRESS NOTES
Assessment and Plan:  Follow-up 6 months with labs  Problem List Items Addressed This Visit        Cardiovascular and Mediastinum    Essential hypertension - Primary     His blood pressure is stable at 124/78 and he is on lisinopril 20 mg daily  Relevant Orders    CBC and differential    Comprehensive metabolic panel    Lipid Panel with Direct LDL reflex    TSH, 3rd generation       Genitourinary    Stage 2 chronic kidney disease     His creatinine went up from 1 5 to 1 6 and he does see Nephrology  Relevant Orders    CBC and differential    Comprehensive metabolic panel    Lipid Panel with Direct LDL reflex    TSH, 3rd generation       Other    Mixed hyperlipidemia     His LDL remains the same at 106 and he is on lovastatin 40 mg daily  Relevant Orders    CBC and differential    Comprehensive metabolic panel    Lipid Panel with Direct LDL reflex    TSH, 3rd generation      Other Visit Diagnoses     Vitamin D deficiency        Relevant Medications    cholecalciferol (VITAMIN D3) 1,000 units tablet    Other Relevant Orders    CBC and differential    Comprehensive metabolic panel    Lipid Panel with Direct LDL reflex    TSH, 3rd generation    Screen for colon cancer        Relevant Orders    CBC and differential    Comprehensive metabolic panel    Lipid Panel with Direct LDL reflex    TSH, 3rd generation    Occult Blood, Fecal Immunochemical    Screening for prostate cancer        Relevant Orders    CBC and differential    Comprehensive metabolic panel    Lipid Panel with Direct LDL reflex    TSH, 3rd generation    PSA, Total Screen    BMI 28 0-28 9,adult                     Diagnoses and all orders for this visit:    Essential hypertension  -     CBC and differential; Future  -     Comprehensive metabolic panel; Future  -     Lipid Panel with Direct LDL reflex; Future  -     TSH, 3rd generation;  Future    Mixed hyperlipidemia  -     CBC and differential; Future  -     Comprehensive metabolic panel; Future  -     Lipid Panel with Direct LDL reflex; Future  -     TSH, 3rd generation; Future    Stage 2 chronic kidney disease  -     CBC and differential; Future  -     Comprehensive metabolic panel; Future  -     Lipid Panel with Direct LDL reflex; Future  -     TSH, 3rd generation; Future    Vitamin D deficiency  -     cholecalciferol (VITAMIN D3) 1,000 units tablet; Take 1 tablet (1,000 Units total) by mouth daily  -     CBC and differential; Future  -     Comprehensive metabolic panel; Future  -     Lipid Panel with Direct LDL reflex; Future  -     TSH, 3rd generation; Future    Screen for colon cancer  -     CBC and differential; Future  -     Comprehensive metabolic panel; Future  -     Lipid Panel with Direct LDL reflex; Future  -     TSH, 3rd generation; Future  -     Occult Blood, Fecal Immunochemical; Future    Screening for prostate cancer  -     CBC and differential; Future  -     Comprehensive metabolic panel; Future  -     Lipid Panel with Direct LDL reflex; Future  -     TSH, 3rd generation; Future  -     PSA, Total Screen; Future    BMI 28 0-28 9,adult              Subjective:      Patient ID: Negra Ott is a 66 y o  male  CC:    Chief Complaint   Patient presents with    Hypertension    Chronic Kidney Disease    Hyperlipidemia     Review BW results  Pt states there are no other concerns at this time  -  [facility]       HPI:    This is a 53-year-old male who comes in for his regular 6 month visit  He is feeling well with no complaints or problems  His blood pressure is stable at 120 4/78 and his weight remains the same at 198 lb  His BMI is 28 4  Reviewing his labs, his RBC count has gone up from 6 0 to 6 2, LDL stayed the same at 106 and his creatinine went up from 1 5 to 1 6  He does see Nephrology and is followed by Nephrology        The following portions of the patient's history were reviewed and updated as appropriate: allergies, current medications, past family history, past medical history, past social history, past surgical history and problem list       Review of Systems   Constitutional: Negative  HENT: Negative  Eyes: Negative  Respiratory: Negative  Cardiovascular: Negative  Gastrointestinal: Negative  Endocrine: Negative  Genitourinary: Negative  Musculoskeletal: Negative  Skin: Negative  Allergic/Immunologic: Negative  Neurological: Negative  Hematological: Negative  Psychiatric/Behavioral: Negative  Data to review:       Objective:    Vitals:    09/13/19 0733   BP: 124/78   BP Location: Left arm   Patient Position: Sitting   Cuff Size: Large   Pulse: 76   Weight: 89 8 kg (198 lb)   Height: 5' 10" (1 778 m)        Physical Exam   Constitutional: He is oriented to person, place, and time  He appears well-developed and well-nourished  HENT:   Head: Normocephalic  Right Ear: External ear normal    Left Ear: External ear normal    Mouth/Throat: Oropharynx is clear and moist    Eyes: Pupils are equal, round, and reactive to light  Conjunctivae and EOM are normal    Neck: Normal range of motion  Neck supple  Cardiovascular: Normal rate, regular rhythm and normal heart sounds  Pulmonary/Chest: Effort normal and breath sounds normal    Abdominal: Soft  Bowel sounds are normal    Musculoskeletal: Normal range of motion  Neurological: He is alert and oriented to person, place, and time  Skin: Skin is warm  Psychiatric: He has a normal mood and affect  His behavior is normal  Judgment and thought content normal    Nursing note and vitals reviewed  BMI Counseling: Body mass index is 28 41 kg/m²   The BMI is above normal  Nutrition recommendations include reducing portion sizes, decreasing overall calorie intake, 3-5 servings of fruits/vegetables daily, reducing fast food intake, consuming healthier snacks, decreasing soda and/or juice intake, moderation in carbohydrate intake, increasing intake of lean protein and reducing intake of cholesterol  Exercise recommendations include moderate aerobic physical activity for 150 minutes/week

## 2019-09-13 NOTE — PATIENT INSTRUCTIONS

## 2019-10-11 DIAGNOSIS — N18.30 STAGE 3 CHRONIC KIDNEY DISEASE (HCC): Primary | ICD-10-CM

## 2019-10-14 ENCOUNTER — TELEPHONE (OUTPATIENT)
Dept: NEPHROLOGY | Facility: HOSPITAL | Age: 64
End: 2019-10-14

## 2019-10-14 NOTE — TELEPHONE ENCOUNTER
I spoke to the patient and he is aware he should repeat the SPEP and do a serum immunofixation  He is aware there is a M spike in the original SPEP that was done  Patient goes to [facility] and I mailed the lab orders to the patient

## 2019-10-14 NOTE — TELEPHONE ENCOUNTER
----- Message from Jayy Arteaga DO sent at 10/11/2019  4:22 PM EDT -----  M spike noted in SPEP  Will repeat SPEP along with serum immunofixation  Please ask the patient to obtain this repeat bloodwork next week  It could help explain his ongoing elevation in serum Cr  Thanks

## 2019-10-28 ENCOUNTER — TELEPHONE (OUTPATIENT)
Dept: NEPHROLOGY | Facility: CLINIC | Age: 64
End: 2019-10-28

## 2019-10-29 LAB
ALBUMIN SERPL ELPH-MCNC: 4.1 G/DL (ref 3.8–4.8)
ALPHA1 GLOB SERPL ELPH-MCNC: 0.3 G/DL (ref 0.2–0.3)
ALPHA2 GLOB SERPL ELPH-MCNC: 0.6 G/DL (ref 0.5–0.9)
BETA1 GLOB SERPL ELPH-MCNC: 0.4 G/DL (ref 0.4–0.6)
BETA2 GLOB SERPL ELPH-MCNC: 0.4 G/DL (ref 0.2–0.5)
GAMMA GLOB SERPL ELPH-MCNC: 1.2 G/DL (ref 0.8–1.7)
M PROTEIN 1 SERPL ELPH-MCNC: 0.3 G/DL
PROT SERPL-MCNC: 7 G/DL (ref 6.1–8.1)

## 2019-11-01 ENCOUNTER — TELEPHONE (OUTPATIENT)
Dept: OTHER | Facility: HOSPITAL | Age: 64
End: 2019-11-01

## 2019-11-01 ENCOUNTER — TELEPHONE (OUTPATIENT)
Dept: HEMATOLOGY ONCOLOGY | Facility: CLINIC | Age: 64
End: 2019-11-01

## 2019-11-01 DIAGNOSIS — D47.2 MONOCLONAL GAMMOPATHY: Primary | ICD-10-CM

## 2019-11-01 NOTE — TELEPHONE ENCOUNTER
Called the [facility] and set up appt for dec 5th at 11:00 in [place] with [facility] with pt, told him if he had to call to cancel or marquez his appt to call [phone]  The address is [address] [place] [address]

## 2019-11-01 NOTE — TELEPHONE ENCOUNTER
I left a message informing the patient of persistent M spike with immunofixation showing IgG kappa monoclonal band  I will place a referral to hematology  I informed him I would do this  I also asked him to call the office back with any questions

## 2019-11-04 ENCOUNTER — TELEPHONE (OUTPATIENT)
Dept: HEMATOLOGY ONCOLOGY | Facility: CLINIC | Age: 64
End: 2019-11-04

## 2019-11-04 NOTE — TELEPHONE ENCOUNTER
Patient called to confirm appt on 12-5-19 @ 11:00am and address   Confirmed appt information and location with patient

## 2019-11-21 DIAGNOSIS — I10 HYPERTENSION, UNSPECIFIED TYPE: ICD-10-CM

## 2019-11-21 RX ORDER — LISINOPRIL 20 MG/1
TABLET ORAL
Qty: 90 TABLET | Refills: 1 | Status: SHIPPED | OUTPATIENT
Start: 2019-11-21 | End: 2019-12-06 | Stop reason: SDUPTHER

## 2019-12-05 ENCOUNTER — CONSULT (OUTPATIENT)
Dept: HEMATOLOGY ONCOLOGY | Facility: CLINIC | Age: 64
End: 2019-12-05
Payer: COMMERCIAL

## 2019-12-05 VITALS
BODY MASS INDEX: 28.77 KG/M2 | WEIGHT: 201 LBS | DIASTOLIC BLOOD PRESSURE: 72 MMHG | HEART RATE: 75 BPM | RESPIRATION RATE: 16 BRPM | TEMPERATURE: 97.9 F | SYSTOLIC BLOOD PRESSURE: 130 MMHG | OXYGEN SATURATION: 97 % | HEIGHT: 70 IN

## 2019-12-05 DIAGNOSIS — D47.2 MGUS (MONOCLONAL GAMMOPATHY OF UNKNOWN SIGNIFICANCE): ICD-10-CM

## 2019-12-05 DIAGNOSIS — R71.8 ABNORMAL RBC: Primary | ICD-10-CM

## 2019-12-05 DIAGNOSIS — D47.2 MONOCLONAL GAMMOPATHY: ICD-10-CM

## 2019-12-05 PROCEDURE — 99203 OFFICE O/P NEW LOW 30 MIN: CPT | Performed by: PHYSICIAN ASSISTANT

## 2019-12-05 NOTE — PROGRESS NOTES
Oncology Outpatient Consult Note  Yazmin Munoz 60 y o  male MRN: @ Encounter: [id number]        Date:  12/5/2019      Assessment/ Plan:      1  MGUS IgG kappa; M= 0 3 g/dL 10/2019     2   Elevated Hematocrit to 51 2 9/2019  He is asked to have additional blood work  F/U thereafter for review  CC:  "High red blood cell count "      HPI:  Yazmin Munoz is a 60 y o  seen for initial consultation 12/5/2019 at the referral of Alejo Sigala DO regarding MGUS and elevated H/H       10/21/2019:  SPEP with immunofixation identified IgG kappa monoclonal band measuring 0 3 gram/deciliter    9/4/2019:  Normal quantitative immunoglobulins  Creatinine 1 61 otherwise normal CMP  Creatinine has been steadily been increasing slowly since 2016  He follows with Dr Marilu Mancini regarding his CKD  Hemoglobin 16 3, hematocrit 51 2, white blood cell count 8 2 with normal differential, platelet count 447    He feels well  Denies any fevers, chills, sweats  Weight has been stable  He has been retired since 64years old  Carmen Haver frequently with his wife who is also retired  Does not smoke or drink  He has psoriasis followed by Dr Renata Blake  He follows with Dr Janel Griffiths regarding Small volume [place] 6 prostate cancer diagnosed on an initial prostate biopsy in 2017  In 2018,a surveillance biopsy again showed 3 of 12 cores with [place] 6 prostate cancer    He is on active surveillance    Test Results:      Labs:   Lab Results   Component Value Date    HGB 16 3 09/04/2019    HCT 51 2 (H) 09/04/2019    MCV 82 1 09/04/2019     09/04/2019    WBC 8 2 09/04/2019     Lab Results   Component Value Date     11/30/2017     11/30/2017     11/30/2017     11/30/2017    K 4 2 09/04/2019     09/04/2019    CO2 26 09/04/2019    BUN 24 09/04/2019    CREATININE 1 61 (H) 09/04/2019    CALCIUM 9 9 09/04/2019    CORRECTEDCA 10 5 (H) 02/05/2018    AST 21 09/04/2019    ALT 23 09/04/2019 ALKPHOS 55 09/04/2019    PROT 7 1 11/30/2017    PROT 7 1 11/30/2017    PROT 7 1 11/30/2017    PROT 7 1 11/30/2017    BILITOT 0 5 11/30/2017    BILITOT 0 5 11/30/2017    BILITOT 0 5 11/30/2017    BILITOT 0 5 11/30/2017           Imaging:   No results found  ROS: As mentioned in HPI & Interval History otherwise 14 point ROS negative  Active Problems:   Patient Active Problem List   Diagnosis    Abnormal RBC    [facility] prostate ([facility] )    Asymptomatic microscopic hematuria    Colon polyps    Mixed hyperlipidemia    Essential hypertension    Proteinuria    Psoriasis    Stage 2 chronic kidney disease    Hypercalcemia    Viral upper respiratory tract infection    MGUS (monoclonal gammopathy of unknown significance)       Past Medical History:   Past Medical History:   Diagnosis Date    Adenocarcinoma of prostate ([facility] )     Last assessed 08/08/17    Chronic kidney disease     Colon polyp        Surgical History:   Past Surgical History:   Procedure Laterality Date    DE COLONOSCOPY FLX DX W/COLLJ SPEC WHEN PFRMD N/A 8/31/2018    Procedure: COLONOSCOPY;  Surgeon: Jeff Perez MD;  Location: [facility] GI [facility]; Service: Colorectal    PROSTATE BIOPSY  02/16/2018    TONSILLECTOMY         Family History:    Family History   Problem Relation Age of Onset    Arthritis Mother     Hyperlipidemia Mother     Hypertension Mother     Diabetes Son        Cancer-related family history is not on file      Social History:   Social History     Socioeconomic History    Marital status: /Civil Union     Spouse name: Not on file    Number of children: Not on file    Years of education: Not on file    Highest education level: Not on file   Occupational History    Occupation:  retired IT and finance   Social Needs    Financial resource strain: Not on file    Food insecurity:     Worry: Not on file     Inability: Not on file    Transportation needs:     Medical: Not on file     Non-medical: Not on file   Tobacco Use    Smoking status: Never Smoker    Smokeless tobacco: Never Used   Substance and Sexual Activity    Alcohol use: Yes     Comment: very rarely    Drug use: No    Sexual activity: Not on file   Lifestyle    Physical activity:     Days per week: Not on file     Minutes per session: Not on file    Stress: Not on file   Relationships    Social connections:     Talks on phone: Not on file     Gets together: Not on file     Attends [employer] service: Not on file     Active member of club or organization: Not on file     Attends meetings of clubs or organizations: Not on file     Relationship status: Not on file    Intimate partner violence:     Fear of current or ex partner: Not on file     Emotionally abused: Not on file     Physically abused: Not on file     Forced sexual activity: Not on file   Other Topics Concern    Not on file   Social History Narrative    Not on file       Current Medications:   Current Outpatient Medications   Medication Sig Dispense Refill    cholecalciferol (VITAMIN D3) 1,000 units tablet Take 1 tablet (1,000 Units total) by mouth daily      ENSTILAR 0 005-0 064 % FOAM APPLY TO SKIN ONCE DAILY  6    lisinopril (ZESTRIL) 20 mg tablet Take 1 tablet (20 mg total) by mouth 2 (two) times a day 180 tablet 3    lisinopril (ZESTRIL) 20 mg tablet TAKE 1 TABLET BY MOUTH EVERY DAY 90 tablet 1    lovastatin (MEVACOR) 40 MG tablet TAKE 1 TABLET DAILY 90 tablet 1     No current facility-administered medications for this visit  Allergies: Allergies   Allergen Reactions    Amlodipine Hives    Penicillins Hives    Fenofibrate Itching and Rash         Physical Exam:    Body surface area is 2 09 meters squared     Ht Readings from Last 3 Encounters:   12/05/19 5' 10" (1 778 m)   09/13/19 5' 10" (1 778 m)   08/09/19 5' 10" (1 778 m)       Wt Readings from Last 3 Encounters:   12/05/19 91 2 kg (201 lb)   09/13/19 89 8 kg (198 lb)   08/09/19 89 9 kg (198 lb 3 2 oz) Temp Readings from Last 3 Encounters:   12/05/19 97 9 °F (36 6 °C) (Tympanic)   03/05/19 98 6 °F (37 °C) (Oral)   02/04/19 97 6 °F (36 4 °C)        BP Readings from Last 3 Encounters:   12/05/19 130/72   09/13/19 124/78   08/09/19 154/86         Pulse Readings from Last 3 Encounters:   12/05/19 75   09/13/19 76   08/09/19 78         Physical Exam    Physical Exam   Constitutional: He is oriented to person, place, and time  He appears well-developed and well-nourished  No distress  HENT:   Head: Normocephalic and atraumatic  Eyes: Conjunctivae are normal    Neck: Normal range of motion  Neck supple  No tracheal deviation present  Cardiovascular: Normal rate and regular rhythm  Exam reveals no gallop and no friction rub  No murmur heard  Pulmonary/Chest: Effort normal and breath sounds normal  No respiratory distress  He has no wheezes  He has no rales  He exhibits no tenderness  Abdominal: Soft  He exhibits no distension  There is no tenderness  Lymphadenopathy:     He has no cervical adenopathy  Neurological: He is alert and oriented to person, place, and time  Skin: Skin is warm and dry  He is not diaphoretic  No erythema  No pallor  Psychiatric: He has a normal mood and affect  His behavior is normal  Judgment and thought content normal    Vitals reviewed            Emergency Contacts:    [address] [place] [address], [phone],

## 2019-12-06 ENCOUNTER — OFFICE VISIT (OUTPATIENT)
Dept: FAMILY MEDICINE CLINIC | Facility: CLINIC | Age: 64
End: 2019-12-06
Payer: COMMERCIAL

## 2019-12-06 VITALS
BODY MASS INDEX: 28.35 KG/M2 | WEIGHT: 198 LBS | HEART RATE: 80 BPM | SYSTOLIC BLOOD PRESSURE: 136 MMHG | DIASTOLIC BLOOD PRESSURE: 78 MMHG | HEIGHT: 70 IN

## 2019-12-06 DIAGNOSIS — L30.9 DERMATITIS: Primary | ICD-10-CM

## 2019-12-06 PROCEDURE — 99213 OFFICE O/P EST LOW 20 MIN: CPT | Performed by: FAMILY MEDICINE

## 2019-12-06 PROCEDURE — 3008F BODY MASS INDEX DOCD: CPT | Performed by: FAMILY MEDICINE

## 2019-12-06 PROCEDURE — 1036F TOBACCO NON-USER: CPT | Performed by: FAMILY MEDICINE

## 2019-12-06 RX ORDER — MOMETASONE FUROATE 1 MG/G
CREAM TOPICAL DAILY
Qty: 45 G | Refills: 0 | Status: SHIPPED | OUTPATIENT
Start: 2019-12-06 | End: 2020-05-14 | Stop reason: ALTCHOICE

## 2019-12-06 RX ORDER — PREDNISONE 10 MG/1
TABLET ORAL
Qty: 36 TABLET | Refills: 0 | Status: SHIPPED | OUTPATIENT
Start: 2019-12-06 | End: 2020-05-14 | Stop reason: ALTCHOICE

## 2019-12-06 NOTE — ASSESSMENT & PLAN NOTE
Patient was placed on Elocon cream to apply to the areas that her very pruritic  Was also placed on prednisone 10 mg tablets:  Five tablets for 2 days, 4 tablets for 3 days, 3 tablets for 3 days, 2 tablets for 2 days and 1 tablet for 1 day

## 2019-12-06 NOTE — PROGRESS NOTES
Assessment and Plan:  Follow-up in 6 days  If no better dermatology consult with Dr Zee Beebe  Problem List Items Addressed This Visit        Musculoskeletal and Integument    Dermatitis - Primary       Patient was placed on Elocon cream to apply to the areas that her very pruritic  Was also placed on prednisone 10 mg tablets:  Five tablets for 2 days, 4 tablets for 3 days, 3 tablets for 3 days, 2 tablets for 2 days and 1 tablet for 1 day  Relevant Medications    mometasone (ELOCON) 0 1 % cream    predniSONE 10 mg tablet                 Diagnoses and all orders for this visit:    Dermatitis  -     mometasone (ELOCON) 0 1 % cream; Apply topically daily  -     predniSONE 10 mg tablet; Five tablets for 2 days, 4 tablets for 3 days, 3 tablets for 3 days, 2 tablets for 2 days, 1 tablet for 1 day  Subjective:      Patient ID: Gustavo Young is a 60 y o  male  CC:    Chief Complaint   Patient presents with    Rash     Rash on lower back, buttocks, groin, inner thighs  Rash is raised and very itchy  Symptoms started 5 days ago  - [facility]       HPI:    This is a 30-year-old male who comes in with a rash on his mid to lower back and buttocks for the last 5 days  It is also located in his groin  It is very pruritic and he has been using Benadryl at bedtime which helps slightly  Nobody in the house has the rash  His blood pressure is 136/78 and his weight is down 3 lb from the previous visit to 193 lb  His BMI is 28 4  The following portions of the patient's history were reviewed and updated as appropriate: allergies, current medications, past family history, past medical history, past social history, past surgical history and problem list       Review of Systems   Skin: Positive for rash          Located from midback 2 buttocks and in groin         Data to review:       Objective:    Vitals:    12/06/19 1000   BP: 136/78   BP Location: Left arm   Patient Position: Sitting   Cuff Size: Large   Pulse: 80   Weight: 89 8 kg (198 lb)   Height: 5' 10" (1 778 m)        Physical Exam   Constitutional: He is oriented to person, place, and time  He appears well-developed and well-nourished  HENT:   Head: Normocephalic  Right Ear: External ear normal    Left Ear: External ear normal    Mouth/Throat: Oropharynx is clear and moist    Eyes: Pupils are equal, round, and reactive to light  Conjunctivae and EOM are normal    Neck: Normal range of motion  Neck supple  Cardiovascular: Normal rate, regular rhythm and normal heart sounds  Pulmonary/Chest: Effort normal and breath sounds normal    Abdominal: Soft  Bowel sounds are normal    Musculoskeletal: Normal range of motion  Neurological: He is alert and oriented to person, place, and time  Skin: Skin is warm  Rash noted  There is erythema  Macular / papular rash located mid back to buttocks and groin  This rash is not to be confused with his psoriasis  Psychiatric: He has a normal mood and affect  His behavior is normal  Judgment and thought content normal    Nursing note and vitals reviewed  Body mass index is 28 41 kg/m²

## 2019-12-23 LAB
ALBUMIN SERPL-MCNC: 4 G/DL (ref 3.6–5.1)
ALBUMIN/GLOB SERPL: 1.4 (CALC) (ref 1–2.5)
ALP SERPL-CCNC: 53 U/L (ref 40–115)
ALT SERPL-CCNC: 18 U/L (ref 9–46)
ASSAY DETAILS: NORMAL
AST SERPL-CCNC: 14 U/L (ref 10–35)
BASOPHILS # BLD AUTO: 26 CELLS/UL (ref 0–200)
BASOPHILS NFR BLD AUTO: 0.2 %
BILIRUB SERPL-MCNC: 0.5 MG/DL (ref 0.2–1.2)
BUN SERPL-MCNC: 31 MG/DL (ref 7–25)
BUN/CREAT SERPL: 22 (CALC) (ref 6–22)
CALCIUM SERPL-MCNC: 10.2 MG/DL (ref 8.6–10.3)
CALR EXON 9 MUT ANL BLD/T: NOT DETECTED
CHLORIDE SERPL-SCNC: 101 MMOL/L (ref 98–110)
CLINICAL INFO: NORMAL
CO2 SERPL-SCNC: 27 MMOL/L (ref 20–32)
CREAT SERPL-MCNC: 1.41 MG/DL (ref 0.7–1.25)
CSF3R EXON 14/17 MUTATION: NOT DETECTED
EOSINOPHIL # BLD AUTO: 299 CELLS/UL (ref 15–500)
EOSINOPHIL NFR BLD AUTO: 2.3 %
EPO SERPL-ACNC: 5.4 MIU/ML (ref 2.6–18.5)
ERYTHROCYTE [DISTWIDTH] IN BLOOD BY AUTOMATED COUNT: 14.7 % (ref 11–15)
GLOBULIN SER CALC-MCNC: 2.8 G/DL (CALC) (ref 1.9–3.7)
GLUCOSE SERPL-MCNC: 70 MG/DL (ref 65–99)
HCT VFR BLD AUTO: 53.7 % (ref 38.5–50)
HGB BLD-MCNC: 17.1 G/DL (ref 13.2–17.1)
IGA SERPL-MCNC: 247 MG/DL (ref 70–320)
IGG SERPL-MCNC: 1200 MG/DL (ref 600–1540)
IGM SERPL-MCNC: 85 MG/DL (ref 50–300)
JAK2 EXON 12 MUT ANL BLD/T: NOT DETECTED
JAK2 V617F MUTATION: NOT DETECTED
KAPPA LC FREE SER-MCNC: 14.7 MG/L (ref 3.3–19.4)
KAPPA LC FREE/LAMBDA FREE SER: 1.32 {RATIO} (ref 0.26–1.65)
LAMBDA LC FREE SERPL-MCNC: 11.1 MG/L (ref 5.7–26.3)
LYMPHOCYTES # BLD AUTO: 1963 CELLS/UL (ref 850–3900)
LYMPHOCYTES NFR BLD AUTO: 15.1 %
MCH RBC QN AUTO: 25.9 PG (ref 27–33)
MCHC RBC AUTO-ENTMCNC: 31.8 G/DL (ref 32–36)
MCV RBC AUTO: 81.4 FL (ref 80–100)
MICRODELETION SYND BLD/T FISH: NORMAL
MONOCYTES # BLD AUTO: 1014 CELLS/UL (ref 200–950)
MONOCYTES NFR BLD AUTO: 7.8 %
MPL EXON 10 MUTATION: NOT DETECTED
NEUTROPHILS # BLD AUTO: 9698 CELLS/UL (ref 1500–7800)
NEUTROPHILS NFR BLD AUTO: 74.6 %
PLATELET # BLD AUTO: 284 THOUSAND/UL (ref 140–400)
PMV BLD REES-ECKER: 10.5 FL (ref 7.5–12.5)
POTASSIUM SERPL-SCNC: 4.6 MMOL/L (ref 3.5–5.3)
PROT SERPL-MCNC: 6.8 G/DL (ref 6.1–8.1)
RBC # BLD AUTO: 6.6 MILLION/UL (ref 4.2–5.8)
SL AMB BLOCK/SAMPLE NUMBER: NORMAL
SL AMB EGFR AFRICAN AMERICAN: 61 ML/MIN/1.73M2
SL AMB EGFR NON AFRICAN AMERICAN: 53 ML/MIN/1.73M2
SODIUM SERPL-SCNC: 134 MMOL/L (ref 135–146)
SPECIMEN SOURCE: NORMAL
WBC # BLD AUTO: 13 THOUSAND/UL (ref 3.8–10.8)

## 2019-12-26 ENCOUNTER — TELEPHONE (OUTPATIENT)
Dept: FAMILY MEDICINE CLINIC | Facility: CLINIC | Age: 64
End: 2019-12-26

## 2019-12-26 DIAGNOSIS — L30.9 DERMATITIS: Primary | ICD-10-CM

## 2019-12-26 NOTE — TELEPHONE ENCOUNTER
PT CALLED IN BECAUSE HE CONTINUES TO HAVE SKIN ISSUES ON HIS BACK  HE DISCUSS THIS WITH PURNIMA AND WAS TOLD TO CALL IN IF CONTINUES FOR A REFERRAL TO [facility]ERM FOR DR Anton Diaz

## 2020-01-07 ENCOUNTER — OFFICE VISIT (OUTPATIENT)
Dept: NEPHROLOGY | Facility: CLINIC | Age: 65
End: 2020-01-07
Payer: COMMERCIAL

## 2020-01-07 VITALS
WEIGHT: 197 LBS | RESPIRATION RATE: 16 BRPM | HEIGHT: 70 IN | HEART RATE: 74 BPM | BODY MASS INDEX: 28.2 KG/M2 | SYSTOLIC BLOOD PRESSURE: 136 MMHG | DIASTOLIC BLOOD PRESSURE: 86 MMHG

## 2020-01-07 DIAGNOSIS — D72.828 OTHER ELEVATED WHITE BLOOD CELL (WBC) COUNT: ICD-10-CM

## 2020-01-07 DIAGNOSIS — R31.29 MICROSCOPIC HEMATURIA: ICD-10-CM

## 2020-01-07 DIAGNOSIS — I10 ESSENTIAL HYPERTENSION: ICD-10-CM

## 2020-01-07 DIAGNOSIS — E83.52 HYPERCALCEMIA: ICD-10-CM

## 2020-01-07 DIAGNOSIS — N18.2 STAGE 2 CHRONIC KIDNEY DISEASE: Primary | ICD-10-CM

## 2020-01-07 DIAGNOSIS — R80.9 PROTEINURIA, UNSPECIFIED TYPE: ICD-10-CM

## 2020-01-07 PROCEDURE — 99214 OFFICE O/P EST MOD 30 MIN: CPT | Performed by: INTERNAL MEDICINE

## 2020-01-07 PROCEDURE — 3079F DIAST BP 80-89 MM HG: CPT | Performed by: INTERNAL MEDICINE

## 2020-01-07 PROCEDURE — 3075F SYST BP GE 130 - 139MM HG: CPT | Performed by: INTERNAL MEDICINE

## 2020-01-07 NOTE — PATIENT INSTRUCTIONS
1  Chronic kidney disease stage 2 in setting of prostatic adenocarcinoma - eGFR ~70s ml/min per CKD EPI equation  Last sCr 1 41 as of Dec  16, 2019  This is stable    -continue to avoid nonsteroidals(motrin, aleve, advil, ibuprofen, toradol, naproxen, celebrex, indomethacin and meloxicam)  -very low risk of progression to End stage disease   -renal u/s August 2017 shows echogenic kidneys with b/l renal cysts  The cause of this is unclear  +protein in urine   -Check BMP in 6 months as well as urine protein:Cr     2  HTN - BP acceptable in office  Have correlated home BP cuff in past which appears to be accurate  Continue low salt diet   -continue lisinopril 20mg twice daily  No longer on amlodipine 5mg daily  -Goal BP < 140/90  Call office if BP at home persistently above goal       3  Proteinuria - noted on UA  Prior UpCr 455mg/g, slightly higher than prior  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  Will repeat UpCr  UA shows 1+ protein       4  Prostate cancer - Undergoing active surveillance by urology  Last PSA 3 7 - stable     5  microscopic hematuria - does not appear to be glomerular in origin and UA shows only 0-2 RBCs as of 11/2017  Repeat UA with microscopy showed 2+ blood, 1+ protein with only 0-2 RBCs  -repeat urinalysis with microscopy shows 0-2 RBCs again  -May have underlying TBM vs IgAN but without significant proteinuria, no need for renal biopsy       6  Hypercalcemia  - corrected calcium 10 2 as of 12/16/19  Phos/mag normal  PTH a bit high at 77  F/u vitamin D  Recent SPEP shows IgG kappa monoclonal band  There was a faint band in the past on SPEP dating back to 2017  IF confirmed IgG kappa monoclonal band  Has seen hematology    7  Leukocytosis - will repeat CBC to ensure this has resolved       RTC in 1 year

## 2020-01-07 NOTE — PROGRESS NOTES
NEPHROLOGY OUTPATIENT PROGRESS NOTE   Miguel Wood 60 y o  male MRN: [id number]  DATE: 1/7/2020  Reason for visit:   Chief Complaint   Patient presents with    Chronic Kidney Disease    Follow-up        Patient Instructions   1  Chronic kidney disease stage 2 in setting of prostatic adenocarcinoma - eGFR ~70s ml/min per CKD EPI equation  Last sCr 1 41 as of Dec  16, 2019  This is stable    -continue to avoid nonsteroidals(motrin, aleve, advil, ibuprofen, toradol, naproxen, celebrex, indomethacin and meloxicam)  -very low risk of progression to End stage disease   -renal u/s August 2017 shows echogenic kidneys with b/l renal cysts  The cause of this is unclear  +protein in urine   -Check BMP in 6 months as well as urine protein:Cr     2  HTN - BP acceptable in office  Have correlated home BP cuff in past which appears to be accurate  Continue low salt diet   -continue lisinopril 20mg twice daily  No longer on amlodipine 5mg daily  -Goal BP < 140/90  Call office if BP at home persistently above goal       3  Proteinuria - noted on UA  Prior UpCr 455mg/g, slightly higher than prior  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  Will repeat UpCr  UA shows 1+ protein       4  Prostate cancer - Undergoing active surveillance by urology  Last PSA 3 7 - stable     5  microscopic hematuria - does not appear to be glomerular in origin and UA shows only 0-2 RBCs as of 11/2017  Repeat UA with microscopy showed 2+ blood, 1+ protein with only 0-2 RBCs  -repeat urinalysis with microscopy shows 0-2 RBCs again  -May have underlying TBM vs IgAN but without significant proteinuria, no need for renal biopsy       6  Hypercalcemia  - corrected calcium 10 2 as of 12/16/19  Phos/mag normal  PTH a bit high at 77  F/u vitamin D  Recent SPEP shows IgG kappa monoclonal band  There was a faint band in the past on SPEP dating back to 2017  IF confirmed IgG kappa monoclonal band  Has seen hematology    7  Leukocytosis - will repeat CBC to ensure this has resolved       RTC in 1 year  Shelly Crenshaw was seen today for chronic kidney disease and follow-up  Diagnoses and all orders for this visit:    Stage 2 chronic kidney disease  -     Basic metabolic panel; Future    Essential hypertension    Proteinuria, unspecified type  -     Protein / creatinine ratio, urine; Future    Hypercalcemia  -     Vitamin D 25 hydroxy; Future    Microscopic hematuria    Other elevated white blood cell (WBC) count  -     CBC and differential; Future        Assessment/Plan:  1  Chronic kidney disease stage 2 in setting of prostatic adenocarcinoma - eGFR ~70s ml/min per CKD EPI equation  Last sCr 1 41 as of Dec  16, 2019  This is stable    -continue to avoid nonsteroidals(motrin, aleve, advil, ibuprofen, toradol, naproxen, celebrex, indomethacin and meloxicam)  -very low risk of progression to End stage disease   -renal u/s August 2017 shows echogenic kidneys with b/l renal cysts  The cause of this is unclear  +protein in urine   -Check BMP in 6 months as well as urine protein:Cr     2  HTN - BP acceptable in office  Have correlated home BP cuff in past which appears to be accurate  Continue low salt diet   -continue lisinopril 20mg twice daily  No longer on amlodipine 5mg daily  -Goal BP < 140/90  Call office if BP at home persistently above goal       3  Proteinuria - noted on UA  Prior UpCr 455mg/g, slightly higher than prior  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  Will repeat UpCr  UA shows 1+ protein       4  Prostate cancer - Undergoing active surveillance by urology  Last PSA 3 7 - stable     5  microscopic hematuria - does not appear to be glomerular in origin and UA shows only 0-2 RBCs as of 11/2017  Repeat UA with microscopy showed 2+ blood, 1+ protein with only 0-2 RBCs     -repeat urinalysis with microscopy shows 0-2 RBCs again  -May have underlying TBM vs IgAN but without significant proteinuria, no need for renal biopsy       6  Hypercalcemia  - corrected calcium 10 2 as of 12/16/19  Phos/mag normal  PTH a bit high at 77  F/u vitamin D  Recent SPEP shows IgG kappa monoclonal band  There was a faint band in the past on SPEP dating back to 2017  IF confirmed IgG kappa monoclonal band  Has seen hematology    7  Leukocytosis - will repeat CBC to ensure this has resolved       RTC in 1 year  SUBJECTIVE / INTERVAL HISTORY:  60 y o  male presents in follow up of CKD  Nuno Reyes denies any recent illness/hospitalizations/medication changes since last office visit  Denies NSAID use  HTN - BP has been at goal at home  Had a rash that is still present  Has psoriasis  Review of Systems   Constitutional: Negative for chills and fever  HENT: Negative for sore throat  Eyes: Negative for visual disturbance  Respiratory: Negative for cough and shortness of breath  Cardiovascular: Negative for chest pain and leg swelling  Gastrointestinal: Negative for abdominal pain, constipation, diarrhea, nausea and vomiting  Endocrine: Negative for polyuria  Genitourinary: Negative for decreased urine volume, difficulty urinating, dysuria and hematuria  Musculoskeletal: Negative for back pain and myalgias  Skin: Positive for rash  Neurological: Negative for dizziness, light-headedness and numbness  Psychiatric/Behavioral: Negative for confusion  OBJECTIVE:  /86 (BP Location: Left arm, Patient Position: Sitting, Cuff Size: Large)   Pulse 74   Resp 16   Ht 5' 10" (1 778 m)   Wt 89 4 kg (197 lb)   BMI 28 27 kg/m²  Body mass index is 28 27 kg/m²  Physical exam:  Physical Exam   Constitutional: He appears well-developed and well-nourished  No distress  HENT:   Head: Normocephalic and atraumatic  Mouth/Throat: No oropharyngeal exudate  Eyes: Right eye exhibits no discharge  Left eye exhibits no discharge  No scleral icterus  Neck: Neck supple  No thyromegaly present  Cardiovascular: Normal rate, regular rhythm and normal heart sounds  Pulmonary/Chest: Effort normal and breath sounds normal  He has no wheezes  He has no rales  Abdominal: Soft  Bowel sounds are normal  He exhibits no distension  There is no tenderness  Musculoskeletal: Normal range of motion  He exhibits no edema  Lymphadenopathy:     He has no cervical adenopathy  Neurological: He is alert  awake   Skin: Skin is warm and dry  Rash (over abdomen, dark pigmentation) noted  He is not diaphoretic  Psychiatric: He has a normal mood and affect  His behavior is normal    Vitals reviewed  Medications:    Current Outpatient Medications:     cholecalciferol (VITAMIN D3) 1,000 units tablet, Take 1 tablet (1,000 Units total) by mouth daily, Disp: , Rfl:     ENSTILAR 0 005-0 064 % FOAM, APPLY TO SKIN ONCE DAILY  , Disp: , Rfl: 6    lisinopril (ZESTRIL) 20 mg tablet, Take 1 tablet (20 mg total) by mouth 2 (two) times a day, Disp: 180 tablet, Rfl: 3    lovastatin (MEVACOR) 40 MG tablet, TAKE 1 TABLET DAILY, Disp: 90 tablet, Rfl: 1    mometasone (ELOCON) 0 1 % cream, Apply topically daily, Disp: 45 g, Rfl: 0    predniSONE 10 mg tablet, Five tablets for 2 days, 4 tablets for 3 days, 3 tablets for 3 days, 2 tablets for 2 days, 1 tablet for 1 day  (Patient not taking: Reported on 1/7/2020), Disp: 36 tablet, Rfl: 0    Allergies:   Allergies as of 01/07/2020 - Reviewed 01/07/2020   Allergen Reaction Noted    Amlodipine Hives 07/23/2018    Penicillins Hives 08/29/2017    Fenofibrate Itching and Rash 07/10/2015       The following portions of the patient's history were reviewed and updated as appropriate: past family history, past surgical history and problem list     Laboratory Results:  Lab Results   Component Value Date    SODIUM 134 (L) 12/16/2019    K 4 6 12/16/2019     12/16/2019    CO2 27 12/16/2019    BUN 31 (H) 12/16/2019    CREATININE 1 41 (H) 12/16/2019    GLUC 70 12/16/2019    CALCIUM 10 2 12/16/2019        Lab Results   Component Value Date    PTH 49 11/30/2017    PTH 49 11/30/2017    PTH 49 11/30/2017    PTH 49 11/30/2017    CALCIUM 10 2 12/16/2019    CAION 5 8 (H) 11/30/2017    CAION 5 8 (H) 11/30/2017    CAION 5 8 (H) 11/30/2017    CAION 5 8 (H) 11/30/2017    PHOS 2 9 11/30/2017    PHOS 2 9 11/30/2017    PHOS 2 9 11/30/2017    PHOS 2 9 11/30/2017       Portions of the record may have been created with voice recognition software   Occasional wrong word or "sound a like" substitutions may have occurred due to the inherent limitations of voice recognition software   Read the chart carefully and recognize, using context, where substitutions have occurred

## 2020-01-19 DIAGNOSIS — E78.2 MIXED HYPERLIPIDEMIA: ICD-10-CM

## 2020-01-19 RX ORDER — LOVASTATIN 40 MG/1
TABLET ORAL
Qty: 90 TABLET | Refills: 0 | Status: SHIPPED | OUTPATIENT
Start: 2020-01-19 | End: 2020-04-15

## 2020-01-21 ENCOUNTER — TELEPHONE (OUTPATIENT)
Dept: HEMATOLOGY ONCOLOGY | Facility: CLINIC | Age: 65
End: 2020-01-21

## 2020-01-21 NOTE — TELEPHONE ENCOUNTER
Patient called to confirm their appointment  Appointment confirmed for [address]               Appointment date and time:1-23-20 @ 1:40               [place] location:  [facility]              Patient verbalized understanding of above

## 2020-01-23 ENCOUNTER — OFFICE VISIT (OUTPATIENT)
Dept: HEMATOLOGY ONCOLOGY | Facility: CLINIC | Age: 65
End: 2020-01-23
Payer: COMMERCIAL

## 2020-01-23 VITALS
HEIGHT: 70 IN | BODY MASS INDEX: 28.72 KG/M2 | RESPIRATION RATE: 18 BRPM | WEIGHT: 200.6 LBS | SYSTOLIC BLOOD PRESSURE: 160 MMHG | TEMPERATURE: 97.6 F | HEART RATE: 139 BPM | DIASTOLIC BLOOD PRESSURE: 84 MMHG | OXYGEN SATURATION: 98 %

## 2020-01-23 DIAGNOSIS — R71.8 ABNORMAL RBC: ICD-10-CM

## 2020-01-23 DIAGNOSIS — D47.2 MGUS (MONOCLONAL GAMMOPATHY OF UNKNOWN SIGNIFICANCE): Primary | ICD-10-CM

## 2020-01-23 PROCEDURE — 99214 OFFICE O/P EST MOD 30 MIN: CPT | Performed by: INTERNAL MEDICINE

## 2020-01-23 NOTE — PROGRESS NOTES
Hematology Outpatient Follow - Up Note  Gustavo Young 60 y o  male MRN: @ Encounter: [id number]        Date:  1/23/2020        Assessment/ Plan:    1  IgG kappa monoclonal gammopathy of undetermined significance, M protein 0 3 grams/deciliter, no evidence of end-organ damage, the patient had history of psoriasis might be related to this continue follow-up every 6 months with CBC, CMP, SPEP, free light chain no need for bone marrow biopsy    2  Elevated hemoglobin, RBC, low MCV, rule out hemoglobinopathy, will order hemoglobin electrophoresis, he has normal erythropoietin might be related to sleep apnea as well I suggest sleep apnea study            HPI:  Gustavo Young is a 60 y o  seen for initial consultation 12/5/2019 at the referral of Sole eMrrill DO regarding MGUS and elevated H/H        10/21/2019:  SPEP with immunofixation identified IgG kappa monoclonal band measuring 0 3 gram/deciliter     9/4/2019:  Normal quantitative immunoglobulins  Creatinine 1 61 otherwise normal CMP  Creatinine has been steadily been increasing slowly since 2016  He follows with Dr Flaco Carter regarding his CKD  Hemoglobin 16 3, hematocrit 51 2, white blood cell count 8 2 with normal differential, platelet count 727     He feels well  Denies any fevers, chills, sweats  Weight has been stable  He has been retired since 64years old  Ladariusdon Wallis frequently with his wife who is also retired      Does not smoke or drink      He has psoriasis followed by Dr Zee Beebe        He follows with Dr Patsy Patiño regarding Small volume [place] 6 prostate cancer diagnosed on an initial prostate biopsy in 2017  In 2018,a surveillance biopsy again showed 3 of 12 cores with [place] 6 prostate cancer    He is on active surveillance    Workup for MGUS showed normal quantitative immunoglobulins, kappa light chain 14 7, lambda light chain 11 1, erythropoietin 5 4, negative for JAK2 mutation profile as well as MPL 1, CALR for polycythemia        Interval History:        Previous Treatment:         Test Results:    Imaging: No results found  Labs:   Lab Results   Component Value Date    WBC 13 0 (H) 12/16/2019    HGB 17 1 12/16/2019    HCT 53 7 (H) 12/16/2019    MCV 81 4 12/16/2019     12/16/2019     Lab Results   Component Value Date     11/30/2017     11/30/2017     11/30/2017     11/30/2017    K 4 6 12/16/2019     12/16/2019    CO2 27 12/16/2019    BUN 31 (H) 12/16/2019    CREATININE 1 41 (H) 12/16/2019    CALCIUM 10 2 12/16/2019    CORRECTEDCA 10 5 (H) 02/05/2018    AST 14 12/16/2019    ALT 18 12/16/2019    ALKPHOS 53 12/16/2019    PROT 7 1 11/30/2017    PROT 7 1 11/30/2017    PROT 7 1 11/30/2017    PROT 7 1 11/30/2017    BILITOT 0 5 11/30/2017    BILITOT 0 5 11/30/2017    BILITOT 0 5 11/30/2017    BILITOT 0 5 11/30/2017       No results found for: IRON, TIBC, FERRITIN    Lab Results   Component Value Date    [id number] 422 08/08/2017         ROS: Review of Systems   Constitutional: Negative  Negative for appetite change, chills, diaphoresis, fatigue, fever and unexpected weight change  HENT:   Negative for hearing loss, lump/mass, mouth sores, nosebleeds, sore throat, trouble swallowing and voice change  Eyes: Negative  Negative for eye problems and icterus  Respiratory: Negative  Negative for chest tightness, cough, hemoptysis and shortness of breath  Cardiovascular: Negative for chest pain and leg swelling  Gastrointestinal: Negative for abdominal distention, abdominal pain, blood in stool, constipation, diarrhea and nausea  Endocrine: Negative  Genitourinary: Negative for dysuria, frequency, hematuria and pelvic pain  Musculoskeletal: Negative  Negative for arthralgias, back pain, flank pain, gait problem, myalgias and neck stiffness  Skin: Negative for itching and rash     Neurological: Negative for dizziness, gait problem, headaches, light-headedness, numbness and speech difficulty  Hematological: Negative for adenopathy  Does not bruise/bleed easily  Psychiatric/Behavioral: Negative for confusion, decreased concentration, depression and sleep disturbance  The patient is not nervous/anxious  Current Medications: Reviewed  Allergies: Reviewed  PMH/FH/SH:  Reviewed      Physical Exam:    Body surface area is 2 09 meters squared  Wt Readings from Last 3 Encounters:   20 91 kg (200 lb 9 6 oz)   20 89 4 kg (197 lb)   19 89 8 kg (198 lb)        Temp Readings from Last 3 Encounters:   20 97 6 °F (36 4 °C) (Tympanic Core)   19 97 9 °F (36 6 °C) (Tympanic)   19 98 6 °F (37 °C) (Oral)        BP Readings from Last 3 Encounters:   20 160/84   20 136/86   19 136/78         Pulse Readings from Last 3 Encounters:   20 (!) 139   20 74   19 80        Physical Exam   Constitutional: He is oriented to person, place, and time  He appears well-developed and well-nourished  No distress  HENT:   Head: Normocephalic and atraumatic  Eyes: Conjunctivae are normal    Neck: Normal range of motion  Neck supple  No tracheal deviation present  Cardiovascular: Normal rate and regular rhythm  Exam reveals no gallop and no friction rub  No murmur heard  Pulmonary/Chest: Effort normal and breath sounds normal  No respiratory distress  He has no wheezes  He has no rales  He exhibits no tenderness  Abdominal: Soft  He exhibits no distension  There is no tenderness  Musculoskeletal: He exhibits no edema  Lymphadenopathy:     He has no cervical adenopathy  Neurological: He is alert and oriented to person, place, and time  Skin: Skin is warm and dry  He is not diaphoretic  No erythema  No pallor  Psychiatric: He has a normal mood and affect  His behavior is normal  Judgment and thought content normal    Vitals reviewed  ECO    Goals and Barriers:  Current Goal: Minimize effects of disease  Barriers: None  Patient's Capacity to Self Care:  Patient is able to self care      Code Status: @[facility]@

## 2020-02-07 LAB — PSA SERPL-MCNC: 4.3 NG/ML

## 2020-02-13 NOTE — PROGRESS NOTES
2/14/2020      Chief Complaint   Patient presents with    Follow-up    Prostate Cancer       Assessment and Plan    60 y o  male managed by Dr Nel Noriega    1  Small volume [place] 6 prostate cancer on active surveillance  - PSA 4 3 (2/6/20), 3 7 (7/8/19), 4 3 (3/1/19), 3 7 (1/7/19), 4 2 (6/25/18)  - PSA very stable, JOSEPH with no nodules, and MRI revealing a PI-RADs 1 score (3/13/19)  - will continue active surveillance, re-check PSA 6 months      FU 6 months with PSA prior and JOSEPH at visit       History of Present Illness  Екатерина Ashraf is a 60 y o  male here for follow up evaluation of small volume [place] 6 prostate cancer  His initial biopsy was performed 2/17/2012 and was positive for [place] 6 prostate cancer involving 25 and 30 percent of the cores  Deldaisha Reis underwent a confirmatory biopsy  2/16/2018  This revealed revealed 3 of 12 cores positive for [place] 6 cancer between 5 and 30 percent of the cores  He then underwent multiparametric MRI of the prostate 3/13/19 which revealed a PI-RADs 1 score  His most recent PSA is stable at 4 3 (2/6/20)  Does mention he has hesitancy with the first void in the morning but this is normal and stable  Otherwise has no LUTs and is doing well  Review of Systems   Constitutional: Negative for activity change, chills and fever  Gastrointestinal: Negative for abdominal distention and abdominal pain  Musculoskeletal: Negative for back pain and gait problem  Psychiatric/Behavioral: Negative for behavioral problems and confusion  Past Medical History  Past Medical History:   Diagnosis Date    Adenocarcinoma of prostate ([facility] [address] )     Last assessed 08/08/17    Chronic kidney disease     Colon polyp        Past Social History  Past Surgical History:   Procedure Laterality Date    AL COLONOSCOPY FLX DX W/COLLJ SPEC WHEN PFRMD N/A 8/31/2018    Procedure: COLONOSCOPY;  Surgeon: Sandra Sky MD;  Location: [facility];   Service: Colorectal    PROSTATE BIOPSY  02/16/2018    TONSILLECTOMY       Social History     Tobacco Use   Smoking Status Never Smoker   Smokeless Tobacco Never Used       Past Family History  Family History   Problem Relation Age of Onset    Arthritis Mother     Hyperlipidemia Mother     Hypertension Mother     Diabetes Son        Past Social history  Social History     Socioeconomic History    Marital status: /Civil Union     Spouse name: Not on file    Number of children: Not on file    Years of education: Not on file    Highest education level: Not on file   Occupational History    Occupation:  retired IT and finance   Social Needs    Financial resource strain: Not on file    Food insecurity:     Worry: Not on file     Inability: Not on file   Spotzot needs:     Medical: Not on file     Non-medical: Not on file   Tobacco Use    Smoking status: Never Smoker    Smokeless tobacco: Never Used   Substance and Sexual Activity    Alcohol use: Yes     Comment: very rarely    Drug use: No    Sexual activity: Not on file   Lifestyle    Physical activity:     Days per week: Not on file     Minutes per session: Not on file    Stress: Not on file   Relationships    Social connections:     Talks on phone: Not on file     Gets together: Not on file     Attends [employer] service: Not on file     Active member of club or organization: Not on file     Attends meetings of clubs or organizations: Not on file     Relationship status: Not on file    Intimate partner violence:     Fear of current or ex partner: Not on file     Emotionally abused: Not on file     Physically abused: Not on file     Forced sexual activity: Not on file   Other Topics Concern    Not on file   Social History Narrative    Not on file       Current Medications  Current Outpatient Medications   Medication Sig Dispense Refill    cholecalciferol (VITAMIN D3) 1,000 units tablet Take 1 tablet (1,000 Units total) by mouth daily      ENSTILAR 0 005-0 064 % FOAM APPLY TO SKIN ONCE DAILY   6    lisinopril (ZESTRIL) 20 mg tablet Take 1 tablet (20 mg total) by mouth 2 (two) times a day 180 tablet 3    lovastatin (MEVACOR) 40 MG tablet TAKE 1 TABLET DAILY 90 tablet 0    mometasone (ELOCON) 0 1 % cream Apply topically daily (Patient not taking: Reported on 2/14/2020) 45 g 0    predniSONE 10 mg tablet Five tablets for 2 days, 4 tablets for 3 days, 3 tablets for 3 days, 2 tablets for 2 days, 1 tablet for 1 day  (Patient not taking: Reported on 2/14/2020) 36 tablet 0     No current facility-administered medications for this visit  Allergies  Allergies   Allergen Reactions    Amlodipine Hives    Penicillins Hives    Fenofibrate Itching and Rash         The following portions of the patient's history were reviewed and updated as appropriate: allergies, current medications, past medical history, past social history, past surgical history and problem list       Vitals  Vitals:    02/14/20 0847   BP: 118/58   BP Location: Left arm   Patient Position: Sitting   Cuff Size: Adult   Pulse: 58   Weight: 90 7 kg (200 lb)   Height: 5' 10" (1 778 m)         Physical Exam  Constitutional   General appearance: Patient is seated and in no acute distress, well appearing and well nourished  Head and Face   Head and face: Normal     Eyes   Conjunctiva and lids: No erythema, swelling or discharge  Ears, Nose, Mouth, and Throat   Hearing: Normal     Pulmonary   Respiratory effort: No increased work of breathing or signs of respiratory distress  Cardiovascular   Examination of extremities for edema and/or varicosities: Normal     Abdomen   Abdomen: Non-tender, no masses  Genitourinary   Digital rectal exam of prostate: smooth, nontender, 50+g prostate with no nodules  Musculoskeletal   Gait and station: Normal     Skin   Skin and subcutaneous tissue: Warm, dry, and intact  No visible lesions or rashes    Psychiatric   Judgment and insight: Normal  Recent and remote memory:  Normal  Mood and affect: Normal      Results  No results found for this or any previous visit (from the past 1 hour(s)) ]  Lab Results   Component Value Date    PSA 4 3 (H) 02/06/2020    PSA 3 7 07/08/2019    PSA 4 3 (H) 03/01/2019     Lab Results   Component Value Date    CALCIUM 10 2 12/16/2019     11/30/2017     11/30/2017     11/30/2017     11/30/2017    K 4 6 12/16/2019    CO2 27 12/16/2019     12/16/2019    BUN 31 (H) 12/16/2019    CREATININE 1 41 (H) 12/16/2019     Lab Results   Component Value Date    WBC 13 0 (H) 12/16/2019    HGB 17 1 12/16/2019    HCT 53 7 (H) 12/16/2019    MCV 81 4 12/16/2019     12/16/2019       Orders  No orders of the defined types were placed in this encounter

## 2020-02-14 ENCOUNTER — OFFICE VISIT (OUTPATIENT)
Dept: UROLOGY | Facility: CLINIC | Age: 65
End: 2020-02-14
Payer: COMMERCIAL

## 2020-02-14 VITALS
SYSTOLIC BLOOD PRESSURE: 118 MMHG | DIASTOLIC BLOOD PRESSURE: 58 MMHG | HEART RATE: 58 BPM | WEIGHT: 200 LBS | HEIGHT: 70 IN | BODY MASS INDEX: 28.63 KG/M2

## 2020-02-14 DIAGNOSIS — C61 PROSTATE CANCER (HCC): Primary | ICD-10-CM

## 2020-02-14 PROCEDURE — 3008F BODY MASS INDEX DOCD: CPT | Performed by: PHYSICIAN ASSISTANT

## 2020-02-14 PROCEDURE — 1036F TOBACCO NON-USER: CPT | Performed by: PHYSICIAN ASSISTANT

## 2020-02-14 PROCEDURE — 3074F SYST BP LT 130 MM HG: CPT | Performed by: PHYSICIAN ASSISTANT

## 2020-02-14 PROCEDURE — 3078F DIAST BP <80 MM HG: CPT | Performed by: PHYSICIAN ASSISTANT

## 2020-02-14 PROCEDURE — 99213 OFFICE O/P EST LOW 20 MIN: CPT | Performed by: PHYSICIAN ASSISTANT

## 2020-04-15 DIAGNOSIS — E78.2 MIXED HYPERLIPIDEMIA: ICD-10-CM

## 2020-04-15 RX ORDER — LOVASTATIN 40 MG/1
TABLET ORAL
Qty: 90 TABLET | Refills: 0 | Status: SHIPPED | OUTPATIENT
Start: 2020-04-15 | End: 2020-07-13

## 2020-05-09 LAB
ALBUMIN SERPL-MCNC: 4.1 G/DL (ref 3.6–5.1)
ALBUMIN/GLOB SERPL: 1.4 (CALC) (ref 1–2.5)
ALP SERPL-CCNC: 54 U/L (ref 35–144)
ALT SERPL-CCNC: 13 U/L (ref 9–46)
AST SERPL-CCNC: 15 U/L (ref 10–35)
BASOPHILS # BLD AUTO: 44 CELLS/UL (ref 0–200)
BASOPHILS NFR BLD AUTO: 0.5 %
BILIRUB SERPL-MCNC: 0.6 MG/DL (ref 0.2–1.2)
BUN SERPL-MCNC: 26 MG/DL (ref 7–25)
BUN/CREAT SERPL: 16 (CALC) (ref 6–22)
CALCIUM SERPL-MCNC: 10.1 MG/DL (ref 8.6–10.3)
CHLORIDE SERPL-SCNC: 103 MMOL/L (ref 98–110)
CHOLEST SERPL-MCNC: 215 MG/DL
CHOLEST/HDLC SERPL: 5.2 (CALC)
CO2 SERPL-SCNC: 26 MMOL/L (ref 20–32)
CREAT SERPL-MCNC: 1.59 MG/DL (ref 0.7–1.25)
EOSINOPHIL # BLD AUTO: 317 CELLS/UL (ref 15–500)
EOSINOPHIL NFR BLD AUTO: 3.6 %
ERYTHROCYTE [DISTWIDTH] IN BLOOD BY AUTOMATED COUNT: 13.4 % (ref 11–15)
GLOBULIN SER CALC-MCNC: 2.9 G/DL (CALC) (ref 1.9–3.7)
GLUCOSE SERPL-MCNC: 83 MG/DL (ref 65–99)
HCT VFR BLD AUTO: 51.8 % (ref 38.5–50)
HDLC SERPL-MCNC: 41 MG/DL
HGB BLD-MCNC: 16.6 G/DL (ref 13.2–17.1)
LDLC SERPL CALC-MCNC: 147 MG/DL (CALC)
LYMPHOCYTES # BLD AUTO: 1936 CELLS/UL (ref 850–3900)
LYMPHOCYTES NFR BLD AUTO: 22 %
MCH RBC QN AUTO: 26.2 PG (ref 27–33)
MCHC RBC AUTO-ENTMCNC: 32 G/DL (ref 32–36)
MCV RBC AUTO: 81.7 FL (ref 80–100)
MONOCYTES # BLD AUTO: 783 CELLS/UL (ref 200–950)
MONOCYTES NFR BLD AUTO: 8.9 %
NEUTROPHILS # BLD AUTO: 5720 CELLS/UL (ref 1500–7800)
NEUTROPHILS NFR BLD AUTO: 65 %
NONHDLC SERPL-MCNC: 174 MG/DL (CALC)
PLATELET # BLD AUTO: 210 THOUSAND/UL (ref 140–400)
PMV BLD REES-ECKER: 10.4 FL (ref 7.5–12.5)
POTASSIUM SERPL-SCNC: 4.4 MMOL/L (ref 3.5–5.3)
PROT SERPL-MCNC: 7 G/DL (ref 6.1–8.1)
PSA SERPL-MCNC: 5.6 NG/ML
RBC # BLD AUTO: 6.34 MILLION/UL (ref 4.2–5.8)
SL AMB EGFR AFRICAN AMERICAN: 53 ML/MIN/1.73M2
SL AMB EGFR NON AFRICAN AMERICAN: 46 ML/MIN/1.73M2
SODIUM SERPL-SCNC: 139 MMOL/L (ref 135–146)
TRIGL SERPL-MCNC: 145 MG/DL
TSH SERPL-ACNC: 1.59 MIU/L (ref 0.4–4.5)
WBC # BLD AUTO: 8.8 THOUSAND/UL (ref 3.8–10.8)

## 2020-05-14 ENCOUNTER — OFFICE VISIT (OUTPATIENT)
Dept: FAMILY MEDICINE CLINIC | Facility: CLINIC | Age: 65
End: 2020-05-14
Payer: COMMERCIAL

## 2020-05-14 VITALS
DIASTOLIC BLOOD PRESSURE: 78 MMHG | BODY MASS INDEX: 28.92 KG/M2 | WEIGHT: 202 LBS | HEIGHT: 70 IN | HEART RATE: 76 BPM | SYSTOLIC BLOOD PRESSURE: 138 MMHG | TEMPERATURE: 97.3 F

## 2020-05-14 DIAGNOSIS — E83.52 HYPERCALCEMIA: ICD-10-CM

## 2020-05-14 DIAGNOSIS — N18.2 STAGE 2 CHRONIC KIDNEY DISEASE: ICD-10-CM

## 2020-05-14 DIAGNOSIS — R71.8 ABNORMAL RBC: ICD-10-CM

## 2020-05-14 DIAGNOSIS — I10 ESSENTIAL HYPERTENSION: Primary | ICD-10-CM

## 2020-05-14 DIAGNOSIS — D47.2 MGUS (MONOCLONAL GAMMOPATHY OF UNKNOWN SIGNIFICANCE): ICD-10-CM

## 2020-05-14 DIAGNOSIS — E78.2 MIXED HYPERLIPIDEMIA: ICD-10-CM

## 2020-05-14 DIAGNOSIS — Z12.11 SCREEN FOR COLON CANCER: ICD-10-CM

## 2020-05-14 DIAGNOSIS — C61 ADENOCARCINOMA OF PROSTATE (HCC): ICD-10-CM

## 2020-05-14 PROBLEM — J06.9 VIRAL UPPER RESPIRATORY TRACT INFECTION: Status: RESOLVED | Noted: 2019-03-05 | Resolved: 2020-05-14

## 2020-05-14 PROCEDURE — 3008F BODY MASS INDEX DOCD: CPT | Performed by: FAMILY MEDICINE

## 2020-05-14 PROCEDURE — 3078F DIAST BP <80 MM HG: CPT | Performed by: FAMILY MEDICINE

## 2020-05-14 PROCEDURE — 3075F SYST BP GE 130 - 139MM HG: CPT | Performed by: FAMILY MEDICINE

## 2020-05-14 PROCEDURE — 1036F TOBACCO NON-USER: CPT | Performed by: FAMILY MEDICINE

## 2020-05-14 PROCEDURE — 99214 OFFICE O/P EST MOD 30 MIN: CPT | Performed by: FAMILY MEDICINE

## 2020-05-15 DIAGNOSIS — I10 HYPERTENSION, UNSPECIFIED TYPE: ICD-10-CM

## 2020-05-15 RX ORDER — LISINOPRIL 20 MG/1
TABLET ORAL
Qty: 90 TABLET | Refills: 1 | Status: SHIPPED | OUTPATIENT
Start: 2020-05-15 | End: 2020-11-09

## 2020-07-13 DIAGNOSIS — E78.2 MIXED HYPERLIPIDEMIA: ICD-10-CM

## 2020-07-13 RX ORDER — LOVASTATIN 40 MG/1
TABLET ORAL
Qty: 90 TABLET | Refills: 0 | Status: SHIPPED | OUTPATIENT
Start: 2020-07-13 | End: 2020-10-06

## 2020-07-17 LAB
ALBUMIN SERPL ELPH-MCNC: 3.9 G/DL (ref 3.8–4.8)
ALBUMIN SERPL-MCNC: 4 G/DL (ref 3.6–5.1)
ALBUMIN/GLOB SERPL: 1.4 (CALC) (ref 1–2.5)
ALP SERPL-CCNC: 50 U/L (ref 35–144)
ALPHA1 GLOB SERPL ELPH-MCNC: 0.3 G/DL (ref 0.2–0.3)
ALPHA2 GLOB SERPL ELPH-MCNC: 0.7 G/DL (ref 0.5–0.9)
ALT SERPL-CCNC: 13 U/L (ref 9–46)
AST SERPL-CCNC: 17 U/L (ref 10–35)
BASOPHILS # BLD AUTO: 41 CELLS/UL (ref 0–200)
BASOPHILS NFR BLD AUTO: 0.5 %
BETA1 GLOB SERPL ELPH-MCNC: 0.4 G/DL (ref 0.4–0.6)
BETA2 GLOB SERPL ELPH-MCNC: 0.4 G/DL (ref 0.2–0.5)
BILIRUB SERPL-MCNC: 0.5 MG/DL (ref 0.2–1.2)
BUN SERPL-MCNC: 25 MG/DL (ref 7–25)
BUN/CREAT SERPL: 17 (CALC) (ref 6–22)
CALCIUM SERPL-MCNC: 9.9 MG/DL (ref 8.6–10.3)
CHLORIDE SERPL-SCNC: 104 MMOL/L (ref 98–110)
CO2 SERPL-SCNC: 28 MMOL/L (ref 20–32)
CREAT SERPL-MCNC: 1.51 MG/DL (ref 0.7–1.25)
EOSINOPHIL # BLD AUTO: 300 CELLS/UL (ref 15–500)
EOSINOPHIL NFR BLD AUTO: 3.7 %
ERYTHROCYTE [DISTWIDTH] IN BLOOD BY AUTOMATED COUNT: 14.5 % (ref 11–15)
ERYTHROCYTE [DISTWIDTH] IN BLOOD BY AUTOMATED COUNT: 14.5 % (ref 11–15)
GAMMA GLOB SERPL ELPH-MCNC: 1.2 G/DL (ref 0.8–1.7)
GLOBULIN SER CALC-MCNC: 2.8 G/DL (CALC) (ref 1.9–3.7)
GLUCOSE SERPL-MCNC: 82 MG/DL (ref 65–99)
HCT VFR BLD AUTO: 50.1 % (ref 38.5–50)
HCT VFR BLD AUTO: 50.1 % (ref 38.5–50)
HGB A MFR BLD: 96.8 %
HGB A2 MFR BLD: 2.2 % (ref 1.8–3.5)
HGB BLD-MCNC: 16 G/DL (ref 13.2–17.1)
HGB BLD-MCNC: 16 G/DL (ref 13.2–17.1)
HGB F MFR BLD: <1 %
HGB FRACT BLD-IMP: ABNORMAL
IGA SERPL-MCNC: 218 MG/DL (ref 70–320)
IGG SERPL-MCNC: 1295 MG/DL (ref 600–1540)
IGM SERPL-MCNC: 75 MG/DL (ref 50–300)
KAPPA LC FREE SER-MCNC: 29.6 MG/L (ref 3.3–19.4)
KAPPA LC FREE/LAMBDA FREE SER: 1.79 {RATIO} (ref 0.26–1.65)
LAMBDA LC FREE SERPL-MCNC: 16.5 MG/L (ref 5.7–26.3)
LYMPHOCYTES # BLD AUTO: 1750 CELLS/UL (ref 850–3900)
LYMPHOCYTES NFR BLD AUTO: 21.6 %
M PROTEIN 1 SERPL ELPH-MCNC: 0.3 G/DL
MCH RBC QN AUTO: 26.1 PG (ref 27–33)
MCH RBC QN AUTO: 26.1 PG (ref 27–33)
MCHC RBC AUTO-ENTMCNC: 31.9 G/DL (ref 32–36)
MCV RBC AUTO: 81.9 FL (ref 80–100)
MCV RBC AUTO: 81.9 FL (ref 80–100)
MONOCYTES # BLD AUTO: 964 CELLS/UL (ref 200–950)
MONOCYTES NFR BLD AUTO: 11.9 %
NEUTROPHILS # BLD AUTO: 5046 CELLS/UL (ref 1500–7800)
NEUTROPHILS NFR BLD AUTO: 62.3 %
PLATELET # BLD AUTO: 168 THOUSAND/UL (ref 140–400)
PMV BLD REES-ECKER: 10.3 FL (ref 7.5–12.5)
POTASSIUM SERPL-SCNC: 4.3 MMOL/L (ref 3.5–5.3)
PROT SERPL-MCNC: 6.8 G/DL (ref 6.1–8.1)
PROT SERPL-MCNC: 6.8 G/DL (ref 6.1–8.1)
RBC # BLD AUTO: 6.12 MILLION/UL (ref 4.2–5.8)
RBC # BLD AUTO: 6.12 MILLION/UL (ref 4.2–5.8)
SL AMB EGFR AFRICAN AMERICAN: 57 ML/MIN/1.73M2
SL AMB EGFR NON AFRICAN AMERICAN: 49 ML/MIN/1.73M2
SODIUM SERPL-SCNC: 138 MMOL/L (ref 135–146)
WBC # BLD AUTO: 8.1 THOUSAND/UL (ref 3.8–10.8)

## 2020-07-21 ENCOUNTER — TELEPHONE (OUTPATIENT)
Dept: HEMATOLOGY ONCOLOGY | Facility: CLINIC | Age: 65
End: 2020-07-21

## 2020-07-21 NOTE — TELEPHONE ENCOUNTER
KUSUM informing patient that his appt on 7/23/20 needed to be R/S  His new appt is 9/3/20 at 11:40 am with Dr Leopoldo Prose at the [facility] location  Patient was instructed to call our office if the new appt does not work for his schedule

## 2020-07-22 ENCOUNTER — TELEPHONE (OUTPATIENT)
Dept: HEMATOLOGY ONCOLOGY | Facility: CLINIC | Age: 65
End: 2020-07-22

## 2020-09-02 ENCOUNTER — TELEPHONE (OUTPATIENT)
Dept: UROLOGY | Facility: CLINIC | Age: 65
End: 2020-09-02

## 2020-09-02 NOTE — TELEPHONE ENCOUNTER
Mailed appointment letter to patient address on file to rescheduled appointment on 10/02/2020 to 10/16/2020 @ 2:30 pm

## 2020-09-03 ENCOUNTER — OFFICE VISIT (OUTPATIENT)
Dept: HEMATOLOGY ONCOLOGY | Facility: CLINIC | Age: 65
End: 2020-09-03
Payer: COMMERCIAL

## 2020-09-03 VITALS
TEMPERATURE: 97.8 F | RESPIRATION RATE: 18 BRPM | HEIGHT: 70 IN | DIASTOLIC BLOOD PRESSURE: 90 MMHG | SYSTOLIC BLOOD PRESSURE: 132 MMHG | BODY MASS INDEX: 28.35 KG/M2 | HEART RATE: 78 BPM | WEIGHT: 198 LBS | OXYGEN SATURATION: 98 %

## 2020-09-03 DIAGNOSIS — N18.2 STAGE 2 CHRONIC KIDNEY DISEASE: ICD-10-CM

## 2020-09-03 DIAGNOSIS — D47.2 MGUS (MONOCLONAL GAMMOPATHY OF UNKNOWN SIGNIFICANCE): Primary | ICD-10-CM

## 2020-09-03 PROCEDURE — 99214 OFFICE O/P EST MOD 30 MIN: CPT | Performed by: INTERNAL MEDICINE

## 2020-09-03 NOTE — PROGRESS NOTES
Hematology Outpatient Follow - Up Note  Yon Damico 60 y o  male MRN: @ Encounter: [id number]        Date:  9/3/2020        Assessment/ Plan:    1  IgG kappa monoclonal gammopathy of undetermined significance, M protein stable at 0 3 gram/deciliter, he had a history of psoriasis, might be related to continuous inflammatory process, will continue to follow-up in 6 months with CBC, CMP, SPEP, free light chain, no need for bone marrow biopsy    2  Elevated hemoglobin most likely secondary to obstructive sleep apnea, hemo globin electrophoresis was reported normal, also JAK2 mutation, MPL 1, CALR mutation were normal             HPI:  Nancy wynne 60 y o  seen for initial consultation 12/5/2019 at the referral of Jessi Mckenna DO regarding MGUS and elevated H/H        10/21/2019:  SPEP with immunofixation identified IgG kappa monoclonal band measuring 0 3 gram/deciliter     9/4/2019:  Normal quantitative immunoglobulins   Creatinine 1 61 otherwise normal CMP    Creatinine has been steadily been increasing slowly since 2016   He follows with Dr Luiza Wilkins regarding his CKD     Hemoglobin 16 3, hematocrit 51 2, white blood cell count 8 2 with normal differential, platelet count 896     He feels well   Denies any fevers, chills, sweats  Audria Pronto has been stable     He has been retired since 64years old  Abhi Guthrie frequently with his wife who is also retired      Does not smoke or drink      He has psoriasis followed by Dr Kelly Nguyen        He follows with Dr Carson Mills regarding Small volume [place] 6 prostate cancer diagnosed on an initial prostate biopsy in 2017   In 2018,a surveillance biopsy again showed 3 of 12 cores with [place] 6 prostate cancer   He is on active surveillance     Workup for MGUS showed normal quantitative immunoglobulins, kappa light chain 14 7, lambda light chain 11 1, erythropoietin 5 4, negative for JAK2 mutation profile as well as MPL 1, CALR for polycythemia, hemoglobin electrophoresis normal     Interval History:        Previous Treatment:         Test Results:    Imaging: No results found  Labs:   Lab Results   Component Value Date    WBC 8 1 07/13/2020    HGB 16 0 07/13/2020    HGB 16 0 07/13/2020    HCT 50 1 (H) 07/13/2020    HCT 50 1 (H) 07/13/2020    MCV 81 9 07/13/2020    MCV 81 9 07/13/2020     07/13/2020     Lab Results   Component Value Date     11/30/2017     11/30/2017     11/30/2017     11/30/2017    K 4 3 07/13/2020     07/13/2020    CO2 28 07/13/2020    BUN 25 07/13/2020    CREATININE 1 51 (H) 07/13/2020    CALCIUM 9 9 07/13/2020    CORRECTEDCA 10 5 (H) 02/05/2018    AST 17 07/13/2020    ALT 13 07/13/2020    ALKPHOS 50 07/13/2020    PROT 7 1 11/30/2017    PROT 7 1 11/30/2017    PROT 7 1 11/30/2017    PROT 7 1 11/30/2017    BILITOT 0 5 11/30/2017    BILITOT 0 5 11/30/2017    BILITOT 0 5 11/30/2017    BILITOT 0 5 11/30/2017       No results found for: IRON, TIBC, FERRITIN    Lab Results   Component Value Date    [id number] 422 08/08/2017         ROS: Review of Systems   Constitutional: Negative  Negative for appetite change, chills, diaphoresis, fatigue, fever and unexpected weight change  HENT:   Negative for hearing loss, lump/mass, mouth sores, nosebleeds, sore throat, trouble swallowing and voice change  Eyes: Negative  Negative for eye problems and icterus  Respiratory: Negative  Negative for chest tightness, cough, hemoptysis and shortness of breath  Cardiovascular: Negative for chest pain and leg swelling  Gastrointestinal: Negative for abdominal distention, abdominal pain, blood in stool, constipation, diarrhea and nausea  Endocrine: Negative  Genitourinary: Negative for dysuria, frequency, hematuria and pelvic pain  Musculoskeletal: Negative  Negative for arthralgias, back pain, flank pain, gait problem, myalgias and neck stiffness  Skin: Negative for itching and rash     Neurological: Negative for dizziness, gait problem, headaches, light-headedness, numbness and speech difficulty  Hematological: Negative for adenopathy  Does not bruise/bleed easily  Psychiatric/Behavioral: Negative for confusion, decreased concentration, depression and sleep disturbance  The patient is not nervous/anxious  Current Medications: Reviewed  Allergies: Reviewed  PMH/FH/SH:  Reviewed      Physical Exam:    Body surface area is 2 08 meters squared  Wt Readings from Last 3 Encounters:   09/03/20 89 8 kg (198 lb)   05/14/20 91 6 kg (202 lb)   02/14/20 90 7 kg (200 lb)        Temp Readings from Last 3 Encounters:   09/03/20 97 8 °F (36 6 °C) (Tympanic)   05/14/20 (!) 97 3 °F (36 3 °C) (Tympanic)   01/23/20 97 6 °F (36 4 °C) (Tympanic Core)        BP Readings from Last 3 Encounters:   09/03/20 132/90   05/14/20 138/78   02/14/20 118/58         Pulse Readings from Last 3 Encounters:   09/03/20 78   05/14/20 76   02/14/20 58        Physical Exam  Vitals signs reviewed  Constitutional:       General: He is not in acute distress  Appearance: He is well-developed  He is not diaphoretic  HENT:      Head: Normocephalic and atraumatic  Eyes:      Conjunctiva/sclera: Conjunctivae normal    Neck:      Musculoskeletal: Normal range of motion and neck supple  Trachea: No tracheal deviation  Cardiovascular:      Rate and Rhythm: Normal rate and regular rhythm  Heart sounds: No murmur  No friction rub  No gallop  Pulmonary:      Effort: Pulmonary effort is normal  No respiratory distress  Breath sounds: Normal breath sounds  No wheezing or rales  Chest:      Chest wall: No tenderness  Abdominal:      General: There is no distension  Palpations: Abdomen is soft  Tenderness: There is no abdominal tenderness  Musculoskeletal:      Right lower leg: Edema present  Left lower leg: Edema present  Lymphadenopathy:      Cervical: No cervical adenopathy  Skin:     General: Skin is warm and dry  Coloration: Skin is not pale  Findings: No erythema  Neurological:      Mental Status: He is alert and oriented to person, place, and time  Psychiatric:         Behavior: Behavior normal          Thought Content: Thought content normal          Judgment: Judgment normal          ECO    Goals and Barriers:  Current Goal: Minimize effects of disease  Barriers: None  Patient's Capacity to Self Care:  Patient is able to self care      Code Status: [unfilled]

## 2020-10-06 DIAGNOSIS — E78.2 MIXED HYPERLIPIDEMIA: ICD-10-CM

## 2020-10-06 LAB — PSA SERPL-MCNC: 3.2 NG/ML

## 2020-10-06 RX ORDER — LOVASTATIN 40 MG/1
TABLET ORAL
Qty: 90 TABLET | Refills: 0 | Status: SHIPPED | OUTPATIENT
Start: 2020-10-06 | End: 2021-01-04

## 2020-10-16 ENCOUNTER — OFFICE VISIT (OUTPATIENT)
Dept: UROLOGY | Facility: CLINIC | Age: 65
End: 2020-10-16
Payer: COMMERCIAL

## 2020-10-16 VITALS
WEIGHT: 198 LBS | SYSTOLIC BLOOD PRESSURE: 160 MMHG | HEART RATE: 78 BPM | HEIGHT: 70 IN | DIASTOLIC BLOOD PRESSURE: 78 MMHG | TEMPERATURE: 96.9 F | BODY MASS INDEX: 28.35 KG/M2

## 2020-10-16 DIAGNOSIS — C61 PROSTATE CANCER (HCC): Primary | ICD-10-CM

## 2020-10-16 PROCEDURE — 3078F DIAST BP <80 MM HG: CPT | Performed by: UROLOGY

## 2020-10-16 PROCEDURE — 99214 OFFICE O/P EST MOD 30 MIN: CPT | Performed by: UROLOGY

## 2020-10-16 PROCEDURE — 1036F TOBACCO NON-USER: CPT | Performed by: UROLOGY

## 2020-10-26 ENCOUNTER — IMMUNIZATIONS (OUTPATIENT)
Dept: FAMILY MEDICINE CLINIC | Facility: CLINIC | Age: 65
End: 2020-10-26
Payer: COMMERCIAL

## 2020-10-26 DIAGNOSIS — Z23 ENCOUNTER FOR IMMUNIZATION: ICD-10-CM

## 2020-10-26 PROCEDURE — 90682 RIV4 VACC RECOMBINANT DNA IM: CPT

## 2020-10-26 PROCEDURE — 90471 IMMUNIZATION ADMIN: CPT

## 2020-11-09 DIAGNOSIS — I10 HYPERTENSION, UNSPECIFIED TYPE: ICD-10-CM

## 2020-11-09 RX ORDER — LISINOPRIL 20 MG/1
TABLET ORAL
Qty: 90 TABLET | Refills: 1 | Status: SHIPPED | OUTPATIENT
Start: 2020-11-09 | End: 2021-01-06

## 2020-11-24 LAB
ALBUMIN SERPL-MCNC: 4 G/DL (ref 3.6–5.1)
ALBUMIN/GLOB SERPL: 1.4 (CALC) (ref 1–2.5)
ALP SERPL-CCNC: 52 U/L (ref 35–144)
ALT SERPL-CCNC: 15 U/L (ref 9–46)
AST SERPL-CCNC: 17 U/L (ref 10–35)
BASOPHILS # BLD AUTO: 43 CELLS/UL (ref 0–200)
BASOPHILS NFR BLD AUTO: 0.6 %
BILIRUB SERPL-MCNC: 0.6 MG/DL (ref 0.2–1.2)
BUN SERPL-MCNC: 22 MG/DL (ref 7–25)
BUN/CREAT SERPL: 15 (CALC) (ref 6–22)
CALCIUM SERPL-MCNC: 9.9 MG/DL (ref 8.6–10.3)
CHLORIDE SERPL-SCNC: 104 MMOL/L (ref 98–110)
CHOLEST SERPL-MCNC: 177 MG/DL
CHOLEST/HDLC SERPL: 4.7 (CALC)
CO2 SERPL-SCNC: 27 MMOL/L (ref 20–32)
CREAT SERPL-MCNC: 1.47 MG/DL (ref 0.7–1.25)
EOSINOPHIL # BLD AUTO: 270 CELLS/UL (ref 15–500)
EOSINOPHIL NFR BLD AUTO: 3.8 %
ERYTHROCYTE [DISTWIDTH] IN BLOOD BY AUTOMATED COUNT: 13.8 % (ref 11–15)
GLOBULIN SER CALC-MCNC: 2.8 G/DL (CALC) (ref 1.9–3.7)
GLUCOSE SERPL-MCNC: 82 MG/DL (ref 65–99)
HCT VFR BLD AUTO: 51.2 % (ref 38.5–50)
HDLC SERPL-MCNC: 38 MG/DL
HGB BLD-MCNC: 16.2 G/DL (ref 13.2–17.1)
LDLC SERPL CALC-MCNC: 110 MG/DL (CALC)
LYMPHOCYTES # BLD AUTO: 1448 CELLS/UL (ref 850–3900)
LYMPHOCYTES NFR BLD AUTO: 20.4 %
MCH RBC QN AUTO: 26.1 PG (ref 27–33)
MCHC RBC AUTO-ENTMCNC: 31.6 G/DL (ref 32–36)
MCV RBC AUTO: 82.4 FL (ref 80–100)
MONOCYTES # BLD AUTO: 682 CELLS/UL (ref 200–950)
MONOCYTES NFR BLD AUTO: 9.6 %
NEUTROPHILS # BLD AUTO: 4658 CELLS/UL (ref 1500–7800)
NEUTROPHILS NFR BLD AUTO: 65.6 %
NONHDLC SERPL-MCNC: 139 MG/DL (CALC)
PLATELET # BLD AUTO: 173 THOUSAND/UL (ref 140–400)
PMV BLD REES-ECKER: 10.6 FL (ref 7.5–12.5)
POTASSIUM SERPL-SCNC: 4.2 MMOL/L (ref 3.5–5.3)
PROT SERPL-MCNC: 6.8 G/DL (ref 6.1–8.1)
RBC # BLD AUTO: 6.21 MILLION/UL (ref 4.2–5.8)
SL AMB EGFR AFRICAN AMERICAN: 58 ML/MIN/1.73M2
SL AMB EGFR NON AFRICAN AMERICAN: 50 ML/MIN/1.73M2
SODIUM SERPL-SCNC: 138 MMOL/L (ref 135–146)
TRIGL SERPL-MCNC: 172 MG/DL
WBC # BLD AUTO: 7.1 THOUSAND/UL (ref 3.8–10.8)

## 2020-12-03 LAB — HEMOCCULT STL QL IA: NOT DETECTED

## 2020-12-16 ENCOUNTER — OFFICE VISIT (OUTPATIENT)
Dept: FAMILY MEDICINE CLINIC | Facility: CLINIC | Age: 65
End: 2020-12-16
Payer: COMMERCIAL

## 2020-12-16 VITALS
DIASTOLIC BLOOD PRESSURE: 82 MMHG | TEMPERATURE: 97.5 F | BODY MASS INDEX: 28.49 KG/M2 | HEART RATE: 78 BPM | WEIGHT: 199 LBS | HEIGHT: 70 IN | SYSTOLIC BLOOD PRESSURE: 140 MMHG

## 2020-12-16 DIAGNOSIS — E78.2 MIXED HYPERLIPIDEMIA: ICD-10-CM

## 2020-12-16 DIAGNOSIS — C61 ADENOCARCINOMA OF PROSTATE (HCC): ICD-10-CM

## 2020-12-16 DIAGNOSIS — I10 ESSENTIAL HYPERTENSION: Primary | ICD-10-CM

## 2020-12-16 PROCEDURE — 3008F BODY MASS INDEX DOCD: CPT | Performed by: PHYSICIAN ASSISTANT

## 2020-12-16 PROCEDURE — 3077F SYST BP >= 140 MM HG: CPT | Performed by: PHYSICIAN ASSISTANT

## 2020-12-16 PROCEDURE — 1036F TOBACCO NON-USER: CPT | Performed by: PHYSICIAN ASSISTANT

## 2020-12-16 PROCEDURE — 99214 OFFICE O/P EST MOD 30 MIN: CPT | Performed by: PHYSICIAN ASSISTANT

## 2020-12-16 PROCEDURE — 3079F DIAST BP 80-89 MM HG: CPT | Performed by: PHYSICIAN ASSISTANT

## 2021-01-02 LAB
25(OH)D3 SERPL-MCNC: 29 NG/ML (ref 30–100)
BASOPHILS # BLD AUTO: 38 CELLS/UL (ref 0–200)
BASOPHILS NFR BLD AUTO: 0.4 %
CREAT UR-MCNC: 90 MG/DL (ref 20–320)
EOSINOPHIL # BLD AUTO: 263 CELLS/UL (ref 15–500)
EOSINOPHIL NFR BLD AUTO: 2.8 %
ERYTHROCYTE [DISTWIDTH] IN BLOOD BY AUTOMATED COUNT: 13.8 % (ref 11–15)
HCT VFR BLD AUTO: 49.2 % (ref 38.5–50)
HGB BLD-MCNC: 15.3 G/DL (ref 13.2–17.1)
LYMPHOCYTES # BLD AUTO: 1842 CELLS/UL (ref 850–3900)
LYMPHOCYTES NFR BLD AUTO: 19.6 %
MCH RBC QN AUTO: 25.9 PG (ref 27–33)
MCHC RBC AUTO-ENTMCNC: 31.1 G/DL (ref 32–36)
MCV RBC AUTO: 83.2 FL (ref 80–100)
MONOCYTES # BLD AUTO: 1166 CELLS/UL (ref 200–950)
MONOCYTES NFR BLD AUTO: 12.4 %
NEUTROPHILS # BLD AUTO: 6091 CELLS/UL (ref 1500–7800)
NEUTROPHILS NFR BLD AUTO: 64.8 %
PLATELET # BLD AUTO: 183 THOUSAND/UL (ref 140–400)
PMV BLD REES-ECKER: 10.4 FL (ref 7.5–12.5)
PROT UR-MCNC: 92 MG/DL (ref 5–25)
PROT/CREAT UR: 1.02 MG/MG CREAT (ref 0.02–0.13)
PROT/CREAT UR: 1022 MG/G CREAT (ref 22–128)
RBC # BLD AUTO: 5.91 MILLION/UL (ref 4.2–5.8)
WBC # BLD AUTO: 9.4 THOUSAND/UL (ref 3.8–10.8)

## 2021-01-04 DIAGNOSIS — E78.2 MIXED HYPERLIPIDEMIA: ICD-10-CM

## 2021-01-04 RX ORDER — LOVASTATIN 40 MG/1
TABLET ORAL
Qty: 90 TABLET | Refills: 0 | Status: SHIPPED | OUTPATIENT
Start: 2021-01-04 | End: 2021-03-30

## 2021-01-06 ENCOUNTER — OFFICE VISIT (OUTPATIENT)
Dept: NEPHROLOGY | Facility: CLINIC | Age: 66
End: 2021-01-06
Payer: COMMERCIAL

## 2021-01-06 VITALS
SYSTOLIC BLOOD PRESSURE: 144 MMHG | RESPIRATION RATE: 16 BRPM | TEMPERATURE: 97.8 F | DIASTOLIC BLOOD PRESSURE: 88 MMHG | BODY MASS INDEX: 28.49 KG/M2 | HEIGHT: 70 IN | HEART RATE: 73 BPM | WEIGHT: 199 LBS

## 2021-01-06 DIAGNOSIS — R31.29 MICROSCOPIC HEMATURIA: ICD-10-CM

## 2021-01-06 DIAGNOSIS — E83.52 HYPERCALCEMIA: ICD-10-CM

## 2021-01-06 DIAGNOSIS — I10 HYPERTENSION, UNSPECIFIED TYPE: ICD-10-CM

## 2021-01-06 DIAGNOSIS — I10 ESSENTIAL HYPERTENSION: ICD-10-CM

## 2021-01-06 DIAGNOSIS — R80.9 PROTEINURIA, UNSPECIFIED TYPE: ICD-10-CM

## 2021-01-06 DIAGNOSIS — D47.2 MGUS (MONOCLONAL GAMMOPATHY OF UNKNOWN SIGNIFICANCE): ICD-10-CM

## 2021-01-06 DIAGNOSIS — N18.2 STAGE 2 CHRONIC KIDNEY DISEASE: Primary | ICD-10-CM

## 2021-01-06 PROCEDURE — 3079F DIAST BP 80-89 MM HG: CPT | Performed by: INTERNAL MEDICINE

## 2021-01-06 PROCEDURE — 99214 OFFICE O/P EST MOD 30 MIN: CPT | Performed by: INTERNAL MEDICINE

## 2021-01-06 PROCEDURE — 3008F BODY MASS INDEX DOCD: CPT | Performed by: INTERNAL MEDICINE

## 2021-01-06 PROCEDURE — 3077F SYST BP >= 140 MM HG: CPT | Performed by: INTERNAL MEDICINE

## 2021-01-06 PROCEDURE — 1036F TOBACCO NON-USER: CPT | Performed by: INTERNAL MEDICINE

## 2021-01-06 RX ORDER — LISINOPRIL 40 MG/1
20 TABLET ORAL DAILY
Qty: 90 TABLET | Refills: 1 | Status: SHIPPED | OUTPATIENT
Start: 2021-01-06 | End: 2021-12-14 | Stop reason: SDUPTHER

## 2021-01-06 NOTE — PATIENT INSTRUCTIONS
1  Chronic kidney disease stage 2 in setting of prostatic adenocarcinoma - eGFR ~70s ml/min per CKD EPI equation  Last sCr 1 47 as of 11/23/20 This is stable    -continue to avoid nonsteroidals(motrin, aleve, advil, ibuprofen, toradol, naproxen, celebrex, indomethacin and meloxicam)  -very low risk of progression to End stage disease   -renal u/s August 2017 shows echogenic kidneys with b/l renal cysts  The cause of this is unclear  +protein in urine   -Check BMP in 3 months as well as urine protein:Cr and microalb:Cr     2  HTN - BP elevated in office  Have correlated home BP cuff in past which appears to be accurate  Continue low salt diet  -on lisinopril 20mg daily  No longer on amlodipine 5mg daily  -will increase lisinopril to 40mg daily  -Goal BP < 130/80  Call office if BP at home persistently above goal       3  Proteinuria - noted on UA  Prior UpCr 1022mg/g, higher than prior  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  UA shows 1+ protein    -Will increase lisinopril 20mg to 40mg daily       4  Prostate cancer - Undergoing active surveillance by urology  Last PSA 3 7 - stable     5  microscopic hematuria - does not appear to be glomerular in origin and UA shows only 0-2 RBCs as of 11/2017  Repeat UA with microscopy showed 2+ blood, 1+ protein with only 0-2 RBCs as of July 2019   -May have underlying TBM vs IgAN  -?if rise in UpCr as no longer on twice daily lisinopril  ACEi lowers proteinuria  -Expect improvement with higher dose lisinopril       6  Hypercalcemia  - resolved as of 11/23/20  Vitamin D insufficiency noted     RTC in 6 months  Start lisinopril 40mg daily  Check BMP next week to monitor potassium  Repeat BMP, UA and UpCr in 3 months

## 2021-01-06 NOTE — PROGRESS NOTES
NEPHROLOGY OUTPATIENT PROGRESS NOTE   Jan Snellen 63 y o  male MRN: [id number]  DATE: 1/6/2021  Reason for visit:   Chief Complaint   Patient presents with    Chronic Kidney Disease    Follow-up        Patient Instructions   1  Chronic kidney disease stage 2 in setting of prostatic adenocarcinoma - eGFR ~70s ml/min per CKD EPI equation  Last sCr 1 47 as of 11/23/20 This is stable    -continue to avoid nonsteroidals(motrin, aleve, advil, ibuprofen, toradol, naproxen, celebrex, indomethacin and meloxicam)  -very low risk of progression to End stage disease   -renal u/s August 2017 shows echogenic kidneys with b/l renal cysts  The cause of this is unclear  +protein in urine   -Check BMP in 3 months as well as urine protein:Cr and microalb:Cr     2  HTN - BP elevated in office  Have correlated home BP cuff in past which appears to be accurate  Continue low salt diet  -on lisinopril 20mg daily  No longer on amlodipine 5mg daily  -will increase lisinopril to 40mg daily  -Goal BP < 130/80  Call office if BP at home persistently above goal       3  Proteinuria - noted on UA  Prior UpCr 1022mg/g, higher than prior  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  UA shows 1+ protein    -Will increase lisinopril 20mg to 40mg daily       4  Prostate cancer - Undergoing active surveillance by urology  Last PSA 3 7 - stable     5  microscopic hematuria - does not appear to be glomerular in origin and UA shows only 0-2 RBCs as of 11/2017  Repeat UA with microscopy showed 2+ blood, 1+ protein with only 0-2 RBCs as of July 2019   -May have underlying TBM vs IgAN  -?if rise in UpCr as no longer on twice daily lisinopril  ACEi lowers proteinuria  -Expect improvement with higher dose lisinopril       6  Hypercalcemia  - resolved as of 11/23/20  Vitamin D insufficiency noted     RTC in 6 months  Start lisinopril 40mg daily  Check BMP next week to monitor potassium    Repeat BMP, UA and UpCr in 3 deed[place] Stoner was seen today for chronic kidney disease and follow-up  Diagnoses and all orders for this visit:    Stage 2 chronic kidney disease  -     Basic metabolic panel; Future  -     Basic metabolic panel; Future  -     Urinalysis with microscopic; Future  -     Protein / creatinine ratio, urine; Future    Essential hypertension    Microscopic hematuria  -     Urinalysis with microscopic; Future    Proteinuria, unspecified type  -     Protein / creatinine ratio, urine; Future    Hypercalcemia    MGUS (monoclonal gammopathy of unknown significance)    Hypertension, unspecified type  -     lisinopril (ZESTRIL) 40 mg tablet; Take 0 5 tablets (20 mg total) by mouth daily        Assessment/Plan:  1  Chronic kidney disease stage 2 in setting of prostatic adenocarcinoma - eGFR ~70s ml/min per CKD EPI equation  Last sCr 1 47 as of 11/23/20 This is stable    -continue to avoid nonsteroidals(motrin, aleve, advil, ibuprofen, toradol, naproxen, celebrex, indomethacin and meloxicam)  -very low risk of progression to End stage disease   -renal u/s August 2017 shows echogenic kidneys with b/l renal cysts  The cause of this is unclear  +protein in urine   -Check BMP in 3 months as well as urine protein:Cr and microalb:Cr     2  HTN - BP elevated in office  Have correlated home BP cuff in past which appears to be accurate  Continue low salt diet  -on lisinopril 20mg daily  No longer on amlodipine 5mg daily  -will increase lisinopril to 40mg daily  -Goal BP < 130/80  Call office if BP at home persistently above goal       3  Proteinuria - noted on UA  Prior UpCr 1022mg/g, higher than prior  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  UA shows 1+ protein    -Will increase lisinopril 20mg to 40mg daily       4  Prostate cancer - Undergoing active surveillance by urology   Last PSA 3 7 - stable     5  microscopic hematuria - does not appear to be glomerular in origin and UA shows only 0-2 RBCs as of 11/2017  Repeat UA with microscopy showed 2+ blood, 1+ protein with only 0-2 RBCs as of July 2019   -May have underlying TBM vs IgAN  -?if rise in UpCr as no longer on twice daily lisinopril  ACEi lowers proteinuria  -Expect improvement with higher dose lisinopril       6  Hypercalcemia  - resolved as of 11/23/20  Vitamin D insufficiency noted     RTC in 6 months  Start lisinopril 40mg daily  Check BMP next week to monitor potassium  Repeat BMP, UA and UpCr in 3 months  SUBJECTIVE / INTERVAL HISTORY:  63 y o  male presents in follow up of CKD  Nuno Reyes denies any recent illness/hospitalizations/medication changes since last office visit  Denies NSAID use  HTN - BP has been 130-140  Just had a grand child, the first     Review of Systems   Constitutional: Negative for chills and fever  HENT: Negative for sore throat  Eyes: Negative for visual disturbance  Respiratory: Negative for cough and shortness of breath  Cardiovascular: Negative for chest pain and leg swelling  Gastrointestinal: Negative for abdominal pain, constipation, diarrhea, nausea and vomiting  Endocrine: Negative for polyuria  Genitourinary: Negative for decreased urine volume, difficulty urinating and dysuria  Musculoskeletal: Negative for back pain and myalgias  Skin: Positive for rash (with psoriasis back of the arms and back)  Neurological: Negative for dizziness, light-headedness and numbness  Psychiatric/Behavioral: Negative for confusion  OBJECTIVE:  /88 (BP Location: Left arm, Patient Position: Sitting, Cuff Size: Large)   Pulse 73   Temp 97 8 °F (36 6 °C) (Temporal)   Resp 16   Ht 5' 10" (1 778 m)   Wt 90 3 kg (199 lb)   BMI 28 55 kg/m²  Body mass index is 28 55 kg/m²  Physical exam:  Physical Exam  Vitals signs reviewed  Constitutional:       General: He is not in acute distress  Appearance: Normal appearance  He is well-developed  He is not diaphoretic     HENT: Head: Normocephalic and atraumatic  Mouth/Throat:      Mouth: Mucous membranes are moist       Pharynx: No oropharyngeal exudate  Eyes:      General: No scleral icterus  Right eye: No discharge  Left eye: No discharge  Comments: +eyeglasses   Neck:      Musculoskeletal: Neck supple  Thyroid: No thyromegaly  Cardiovascular:      Rate and Rhythm: Normal rate and regular rhythm  Heart sounds: Normal heart sounds  Pulmonary:      Effort: Pulmonary effort is normal       Breath sounds: Normal breath sounds  No wheezing or rales  Abdominal:      General: Bowel sounds are normal  There is no distension  Palpations: Abdomen is soft  Tenderness: There is no abdominal tenderness  Musculoskeletal: Normal range of motion  General: No swelling  Lymphadenopathy:      Cervical: No cervical adenopathy  Skin:     General: Skin is warm and dry  Findings: No rash  Neurological:      General: No focal deficit present  Mental Status: He is alert  Comments: awake   Psychiatric:         Mood and Affect: Mood normal          Behavior: Behavior normal          Medications:    Current Outpatient Medications:     cholecalciferol (VITAMIN D3) 1,000 units tablet, Take 1 tablet (1,000 Units total) by mouth daily, Disp: , Rfl:     ENSTILAR 0 005-0 064 % FOAM, APPLY TO SKIN ONCE DAILY  , Disp: , Rfl: 6    lisinopril (ZESTRIL) 40 mg tablet, Take 0 5 tablets (20 mg total) by mouth daily, Disp: 90 tablet, Rfl: 1    lovastatin (MEVACOR) 40 MG tablet, TAKE 1 TABLET BY MOUTH EVERY DAY, Disp: 90 tablet, Rfl: 0    Allergies:   Allergies as of 01/06/2021 - Reviewed 01/06/2021   Allergen Reaction Noted    Amlodipine Hives 07/23/2018    Penicillins Hives 08/29/2017    Fenofibrate Itching and Rash 07/10/2015       The following portions of the patient's history were reviewed and updated as appropriate: past family history, past surgical history and problem list     Laboratory Results:  Lab Results   Component Value Date    SODIUM 138 11/23/2020    K 4 2 11/23/2020     11/23/2020    CO2 27 11/23/2020    BUN 22 11/23/2020    CREATININE 1 47 (H) 11/23/2020    GLUC 82 11/23/2020    CALCIUM 9 9 11/23/2020        Lab Results   Component Value Date    PTH 49 11/30/2017    PTH 49 11/30/2017    PTH 49 11/30/2017    PTH 49 11/30/2017    CALCIUM 9 9 11/23/2020    CAION 5 8 (H) 11/30/2017    CAION 5 8 (H) 11/30/2017    CAION 5 8 (H) 11/30/2017    CAION 5 8 (H) 11/30/2017    PHOS 2 9 11/30/2017    PHOS 2 9 11/30/2017    PHOS 2 9 11/30/2017    PHOS 2 9 11/30/2017       Portions of the record may have been created with voice recognition software   Occasional wrong word or "sound a like" substitutions may have occurred due to the inherent limitations of voice recognition software   Read the chart carefully and recognize, using context, where substitutions have occurred

## 2021-01-12 LAB
BUN SERPL-MCNC: 23 MG/DL (ref 7–25)
BUN/CREAT SERPL: 15 (CALC) (ref 6–22)
CALCIUM SERPL-MCNC: 10.1 MG/DL (ref 8.6–10.3)
CHLORIDE SERPL-SCNC: 103 MMOL/L (ref 98–110)
CO2 SERPL-SCNC: 29 MMOL/L (ref 20–32)
CREAT SERPL-MCNC: 1.58 MG/DL (ref 0.7–1.25)
GLUCOSE SERPL-MCNC: 80 MG/DL (ref 65–99)
POTASSIUM SERPL-SCNC: 4.1 MMOL/L (ref 3.5–5.3)
SL AMB EGFR AFRICAN AMERICAN: 53 ML/MIN/1.73M2
SL AMB EGFR NON AFRICAN AMERICAN: 46 ML/MIN/1.73M2
SODIUM SERPL-SCNC: 139 MMOL/L (ref 135–146)

## 2021-03-30 DIAGNOSIS — E78.2 MIXED HYPERLIPIDEMIA: ICD-10-CM

## 2021-03-30 RX ORDER — LOVASTATIN 40 MG/1
TABLET ORAL
Qty: 90 TABLET | Refills: 0 | Status: SHIPPED | OUTPATIENT
Start: 2021-03-30 | End: 2021-06-23

## 2021-04-06 LAB — PSA SERPL-MCNC: 4.4 NG/ML

## 2021-04-13 ENCOUNTER — IMMUNIZATIONS (OUTPATIENT)
Dept: FAMILY MEDICINE CLINIC | Facility: HOSPITAL | Age: 66
End: 2021-04-13

## 2021-04-13 DIAGNOSIS — Z23 ENCOUNTER FOR IMMUNIZATION: Primary | ICD-10-CM

## 2021-04-13 PROCEDURE — 91301 SARS-COV-2 / COVID-19 MRNA VACCINE (MODERNA) 100 MCG: CPT

## 2021-04-13 PROCEDURE — 0011A SARS-COV-2 / COVID-19 MRNA VACCINE (MODERNA) 100 MCG: CPT

## 2021-04-16 ENCOUNTER — OFFICE VISIT (OUTPATIENT)
Dept: UROLOGY | Facility: CLINIC | Age: 66
End: 2021-04-16
Payer: COMMERCIAL

## 2021-04-16 VITALS
HEART RATE: 70 BPM | WEIGHT: 196 LBS | SYSTOLIC BLOOD PRESSURE: 140 MMHG | BODY MASS INDEX: 28.06 KG/M2 | DIASTOLIC BLOOD PRESSURE: 72 MMHG | HEIGHT: 70 IN

## 2021-04-16 DIAGNOSIS — C61 PROSTATE CANCER (HCC): Primary | ICD-10-CM

## 2021-04-16 DIAGNOSIS — N52.9 ERECTILE DYSFUNCTION, UNSPECIFIED ERECTILE DYSFUNCTION TYPE: ICD-10-CM

## 2021-04-16 PROCEDURE — 3077F SYST BP >= 140 MM HG: CPT | Performed by: PHYSICIAN ASSISTANT

## 2021-04-16 PROCEDURE — 3008F BODY MASS INDEX DOCD: CPT | Performed by: PHYSICIAN ASSISTANT

## 2021-04-16 PROCEDURE — 3078F DIAST BP <80 MM HG: CPT | Performed by: PHYSICIAN ASSISTANT

## 2021-04-16 PROCEDURE — 1036F TOBACCO NON-USER: CPT | Performed by: PHYSICIAN ASSISTANT

## 2021-04-16 PROCEDURE — 99214 OFFICE O/P EST MOD 30 MIN: CPT | Performed by: PHYSICIAN ASSISTANT

## 2021-04-16 RX ORDER — SILDENAFIL CITRATE 20 MG/1
TABLET ORAL
Qty: 30 TABLET | Refills: 0 | Status: SHIPPED | OUTPATIENT
Start: 2021-04-16 | End: 2022-04-14 | Stop reason: SDUPTHER

## 2021-04-16 RX ORDER — LISINOPRIL 20 MG/1
20 TABLET ORAL DAILY
COMMUNITY
Start: 2021-02-06 | End: 2021-05-05

## 2021-04-16 RX ORDER — SILDENAFIL CITRATE 20 MG/1
TABLET ORAL
Qty: 30 TABLET | Refills: 0 | Status: SHIPPED | OUTPATIENT
Start: 2021-04-16 | End: 2021-04-16

## 2021-04-16 NOTE — PROGRESS NOTES
4/16/2021      Chief Complaint   Patient presents with    Follow-up    Prostate Cancer         Assessment and Plan    63 y o  male    1  Small volume [place] 3+3=6 prostate cancer  - initial diagnosis 2012  - last biopsy 2018  - last mpMRI 2019 pirads 2  - last JOSEPH October 2020  - next mpMRI March 2022 with biopsy thereafter  Lab Results   Component Value Date    PSA 4 4 (H) 04/05/2021    PSA 3 2 10/05/2020    PSA 5 6 (H) 05/08/2020     2  ED  - mild  - begin sildenafil 20mg prn    Return 6 months PSA/JOSEPH  Sildenafil trial sent to [facility], may call for update/refills prn  History of Present Illness  Des Nolen is a 63 y o  male here for evaluation of 6 month prostate cancer followup  Small volume [place] 3+3=6 prostate cancer diagnosed on initial TRUS biopsy 2012  Most recent surveillance biopsy 2018 revealed 3 of 12 cores with [place] 6 prostate cancer ranging 5-30% of the cores  mpMRI in March 2019 revealed pirads 2 scoring and no focal lesion  JOSEPH at last visit October 2020 revealed smooth firm prostate without nodule approx 60-80g  He returns today with PSA 4 4  No urinary complaints or hematuria  Next MRI is due March 2022, with biopsy thereafter  He does admit to increasing difficulty maintaining erections, feels he is able to obtain approximately 75% rigid erection  He has not previously used any PDE5 inhibitors or mechanical devices  He is interested in sildenafil  We reviewed the dosing starting with 20 mg on an empty stomach 1 hour prior to sex  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 and feeling well  Review of Systems   Constitutional: Negative for activity change, appetite change, chills, fever and unexpected weight change  HENT: Negative  Respiratory: Negative  Negative for shortness of breath  Cardiovascular: Negative  Negative for chest pain  Gastrointestinal: Negative for abdominal pain, diarrhea, nausea and vomiting  Endocrine: Negative  Genitourinary: Negative for decreased urine volume, difficulty urinating, dysuria, flank pain, frequency, hematuria, penile pain, scrotal swelling, testicular pain and urgency  Musculoskeletal: Negative for back pain and gait problem  Skin: Negative  Allergic/Immunologic: Negative  Neurological: Negative  Hematological: Negative for adenopathy  Does not bruise/bleed easily  Vitals  Vitals:    04/16/21 0819   BP: 140/72   BP Location: Left arm   Patient Position: Sitting   Cuff Size: Adult   Pulse: 70   Weight: 88 9 kg (196 lb)   Height: 5' 10" (1 778 m)       Physical Exam  Vitals signs and nursing note reviewed  Constitutional:       General: He is not in acute distress  Appearance: He is well-developed  He is not diaphoretic  HENT:      Head: Normocephalic and atraumatic  Pulmonary:      Effort: Pulmonary effort is normal    Skin:     General: Skin is warm and dry  Neurological:      Mental Status: He is alert and oriented to person, place, and time        Gait: Gait normal    Psychiatric:         Speech: Speech normal          Behavior: Behavior normal            Past History  Past Medical History:   Diagnosis Date    Adenocarcinoma of prostate ([facility] [address] )     Last assessed 08/08/17    Chronic kidney disease     Colon polyp     Psoriasis      Social History     Socioeconomic History    Marital status: /Civil Union     Spouse name: None    Number of children: None    Years of education: None    Highest education level: None   Occupational History    Occupation:  retired IT and finance   Social Needs    Financial resource strain: None    Food insecurity     Worry: None     Inability: None    Transportation needs     Medical: None     Non-medical: None   Tobacco Use    Smoking status: Never Smoker    Smokeless tobacco: Never Used   Substance and Sexual Activity    Alcohol use: Yes     Comment: very rarely    Drug use: No    Sexual activity: None   Lifestyle    Physical activity     Days per week: None     Minutes per session: None    Stress: None   Relationships    Social connections     Talks on phone: None     Gets together: None     Attends Sabianist service: None     Active member of club or organization: None     Attends meetings of clubs or organizations: None     Relationship status: None    Intimate partner violence     Fear of current or ex partner: None     Emotionally abused: None     Physically abused: None     Forced sexual activity: None   Other Topics Concern    None   Social History Narrative    None     Social History     Tobacco Use   Smoking Status Never Smoker   Smokeless Tobacco Never Used     Family History   Problem Relation Age of Onset    Arthritis Mother     Hyperlipidemia Mother     Hypertension Mother     Diabetes Son        The following portions of the patient's history were reviewed and updated as appropriate: allergies, current medications, past medical history, past social history, past surgical history and problem list     Results  No results found for this or any previous visit (from the past 1 hour(s)) ]  Lab Results   Component Value Date    PSA 4 4 (H) 04/05/2021    PSA 3 2 10/05/2020    PSA 5 6 (H) 05/08/2020    PSA 4 3 (H) 02/06/2020     Lab Results   Component Value Date    CALCIUM 10 1 01/12/2021     11/30/2017     11/30/2017     11/30/2017     11/30/2017    K 4 1 01/12/2021    CO2 29 01/12/2021     01/12/2021    BUN 23 01/12/2021    CREATININE 1 58 (H) 01/12/2021     Lab Results   Component Value Date    WBC 9 4 12/31/2020    HGB 15 3 12/31/2020    HCT 49 2 12/31/2020    MCV 83 2 12/31/2020     12/31/2020

## 2021-05-05 DIAGNOSIS — I10 ESSENTIAL (PRIMARY) HYPERTENSION: ICD-10-CM

## 2021-05-05 RX ORDER — LISINOPRIL 20 MG/1
TABLET ORAL
Qty: 90 TABLET | Refills: 1 | Status: SHIPPED | OUTPATIENT
Start: 2021-05-05 | End: 2021-09-16 | Stop reason: ALTCHOICE

## 2021-05-13 ENCOUNTER — IMMUNIZATIONS (OUTPATIENT)
Dept: FAMILY MEDICINE CLINIC | Facility: HOSPITAL | Age: 66
End: 2021-05-13

## 2021-05-13 DIAGNOSIS — Z23 ENCOUNTER FOR IMMUNIZATION: Primary | ICD-10-CM

## 2021-05-13 PROCEDURE — 91301 SARS-COV-2 / COVID-19 MRNA VACCINE (MODERNA) 100 MCG: CPT

## 2021-05-13 PROCEDURE — 0012A SARS-COV-2 / COVID-19 MRNA VACCINE (MODERNA) 100 MCG: CPT

## 2021-05-27 ENCOUNTER — TELEPHONE (OUTPATIENT)
Dept: HEMATOLOGY ONCOLOGY | Facility: CLINIC | Age: 66
End: 2021-05-27

## 2021-05-27 NOTE — TELEPHONE ENCOUNTER
I called the patient to inform him that Linda Escort won't be available on 6/3 and that the patient was rescheduled for 6/10 @ 8:30 am @ [address], [place]  I then voiced to call the office if for some reason the appointment does not work

## 2021-06-07 LAB
ALBUMIN SERPL ELPH-MCNC: 4 G/DL (ref 3.8–4.8)
ALBUMIN SERPL-MCNC: 4.1 G/DL (ref 3.6–5.1)
ALBUMIN/GLOB SERPL: 1.4 (CALC) (ref 1–2.5)
ALP SERPL-CCNC: 53 U/L (ref 35–144)
ALPHA1 GLOB SERPL ELPH-MCNC: 0.3 G/DL (ref 0.2–0.3)
ALPHA2 GLOB SERPL ELPH-MCNC: 0.7 G/DL (ref 0.5–0.9)
ALT SERPL-CCNC: 13 U/L (ref 9–46)
AST SERPL-CCNC: 18 U/L (ref 10–35)
BASOPHILS # BLD AUTO: 50 CELLS/UL (ref 0–200)
BASOPHILS NFR BLD AUTO: 0.6 %
BETA1 GLOB SERPL ELPH-MCNC: 0.4 G/DL (ref 0.4–0.6)
BETA2 GLOB SERPL ELPH-MCNC: 0.4 G/DL (ref 0.2–0.5)
BILIRUB SERPL-MCNC: 0.5 MG/DL (ref 0.2–1.2)
BUN SERPL-MCNC: 29 MG/DL (ref 7–25)
BUN/CREAT SERPL: 19 (CALC) (ref 6–22)
CALCIUM SERPL-MCNC: 10.2 MG/DL (ref 8.6–10.3)
CHLORIDE SERPL-SCNC: 105 MMOL/L (ref 98–110)
CO2 SERPL-SCNC: 31 MMOL/L (ref 20–32)
CREAT SERPL-MCNC: 1.53 MG/DL (ref 0.7–1.25)
EOSINOPHIL # BLD AUTO: 302 CELLS/UL (ref 15–500)
EOSINOPHIL NFR BLD AUTO: 3.6 %
ERYTHROCYTE [DISTWIDTH] IN BLOOD BY AUTOMATED COUNT: 14 % (ref 11–15)
GAMMA GLOB SERPL ELPH-MCNC: 1.3 G/DL (ref 0.8–1.7)
GLOBULIN SER CALC-MCNC: 3 G/DL (CALC) (ref 1.9–3.7)
GLUCOSE SERPL-MCNC: 79 MG/DL (ref 65–99)
HCT VFR BLD AUTO: 50.3 % (ref 38.5–50)
HGB BLD-MCNC: 15.9 G/DL (ref 13.2–17.1)
IGA SERPL-MCNC: 203 MG/DL (ref 70–320)
IGG SERPL-MCNC: 1374 MG/DL (ref 600–1540)
IGM SERPL-MCNC: 75 MG/DL (ref 50–300)
KAPPA LC FREE SER-MCNC: 41.5 MG/L (ref 3.3–19.4)
KAPPA LC FREE/LAMBDA FREE SER: 1.9 {RATIO} (ref 0.26–1.65)
LAMBDA LC FREE SERPL-MCNC: 21.8 MG/L (ref 5.7–26.3)
LYMPHOCYTES # BLD AUTO: 2058 CELLS/UL (ref 850–3900)
LYMPHOCYTES NFR BLD AUTO: 24.5 %
M PROTEIN 1 SERPL ELPH-MCNC: 0.4 G/DL
MCH RBC QN AUTO: 26.6 PG (ref 27–33)
MCHC RBC AUTO-ENTMCNC: 31.6 G/DL (ref 32–36)
MCV RBC AUTO: 84.1 FL (ref 80–100)
MONOCYTES # BLD AUTO: 941 CELLS/UL (ref 200–950)
MONOCYTES NFR BLD AUTO: 11.2 %
NEUTROPHILS # BLD AUTO: 5048 CELLS/UL (ref 1500–7800)
NEUTROPHILS NFR BLD AUTO: 60.1 %
PLATELET # BLD AUTO: 184 THOUSAND/UL (ref 140–400)
PMV BLD REES-ECKER: 9.9 FL (ref 7.5–12.5)
POTASSIUM SERPL-SCNC: 4.6 MMOL/L (ref 3.5–5.3)
PROT SERPL-MCNC: 7.1 G/DL (ref 6.1–8.1)
PROT SERPL-MCNC: 7.1 G/DL (ref 6.1–8.1)
RBC # BLD AUTO: 5.98 MILLION/UL (ref 4.2–5.8)
SL AMB EGFR AFRICAN AMERICAN: 55 ML/MIN/1.73M2
SL AMB EGFR NON AFRICAN AMERICAN: 48 ML/MIN/1.73M2
SODIUM SERPL-SCNC: 140 MMOL/L (ref 135–146)
WBC # BLD AUTO: 8.4 THOUSAND/UL (ref 3.8–10.8)

## 2021-06-10 ENCOUNTER — OFFICE VISIT (OUTPATIENT)
Dept: HEMATOLOGY ONCOLOGY | Facility: CLINIC | Age: 66
End: 2021-06-10
Payer: COMMERCIAL

## 2021-06-10 VITALS
HEART RATE: 78 BPM | TEMPERATURE: 98 F | HEIGHT: 70 IN | BODY MASS INDEX: 27.94 KG/M2 | SYSTOLIC BLOOD PRESSURE: 140 MMHG | DIASTOLIC BLOOD PRESSURE: 76 MMHG | OXYGEN SATURATION: 98 % | WEIGHT: 195.2 LBS | RESPIRATION RATE: 16 BRPM

## 2021-06-10 DIAGNOSIS — D47.2 MGUS (MONOCLONAL GAMMOPATHY OF UNKNOWN SIGNIFICANCE): Primary | ICD-10-CM

## 2021-06-10 PROCEDURE — 3008F BODY MASS INDEX DOCD: CPT | Performed by: PHYSICIAN ASSISTANT

## 2021-06-10 PROCEDURE — 1036F TOBACCO NON-USER: CPT | Performed by: PHYSICIAN ASSISTANT

## 2021-06-10 PROCEDURE — 99214 OFFICE O/P EST MOD 30 MIN: CPT | Performed by: PHYSICIAN ASSISTANT

## 2021-06-10 NOTE — PROGRESS NOTES
Hematology/Oncology Outpatient Follow- up Note  Paxton Olivo 63 y o  male MRN: @ Encounter: [id number]        Date:  6/10/2021      Assessment / Plan:    1  IgG kappa monoclonal gammopathy of undetermined significance dx 12/2019  M protein 0 3 gram/deciliter at presentation  0 4 6/3/21  Serum kappa free light chain mildly elevated at 41 with ratio 1 9  Normal quantitative immunoglobulins 6/2021  He had a history of psoriasis, might be related to continuous inflammatory process,     will continue to follow-up in 6 months with CBC, CMP, SPEP, free light chain, no need for bone marrow biopsy     2  Elevated hemoglobin most likely secondary to obstructive sleep apnea, hemoglobin electrophoresis was reported normal   Negative JAK2 mutation, MPL 1, CALR mutation     3  CKD  Cr 1 3- 1 6  range since at least 8/2016          Small volume [place] 6 prostate cancer diagnosed on an initial prostate biopsy in 2017  In 2018, a surveillance biopsy again showed 3 of 12 cores with [place] prostate cancer  He is on active surveillance per Dr Hipolito Lynn            HPI:  Mell King a 60 y o  seen for initial consultation 12/5/2019 at the referral of Jessi Mckenna DO regarding MGUS and elevated H/H        10/21/2019:  SPEP with immunofixation identified IgG kappa monoclonal band measuring 0 3 gram/deciliter     9/4/2019:  Normal quantitative immunoglobulins   Creatinine 1 61 otherwise normal CMP    Creatinine has been steadily been increasing slowly since 2016  So Del Real follows with Dr Mya Valencia regarding his CKD     Hemoglobin 16 3, hematocrit 51 2, white blood cell count 8 2 with normal differential, platelet count 322     He feels well   Denies any fevers, chills, sweats  Lai Boubacar has been stable     He has been retired since 64years old  Ambrosio Yung frequently with his wife who is also retired      Does not smoke or drink      He has psoriasis followed by Dr Ben Cifuentes        He follows with Dr Hipolito Lynn regarding small volume [place] 6 prostate cancer diagnosed on an initial prostate biopsy in 2017  In 2018,a surveillance biopsy again showed 3 of 12 cores with [place] 6 prostate cancer   He is on active surveillance     Workup for MGUS 12/2019 showed normal quantitative immunoglobulins, kappa light chain 14 7, lambda light chain 11 1, erythropoietin 5 4, negative for JAK2 mutation profile as well as MPL 1, CALR for polycythemia, hemoglobin electrophoresis normal        Interval History:    6/3/21   Serum kappa free light chain 41 5, ratio 1 9  Normal quantitative immunoglobulins  hemoglobin 15 9, hematocrit 50 3, white blood cell count 8 4, normal differential, platelets 242    Creatinine in stable 1 5  Calcium  10 2  With albumin 4 1, total protein 7 1    Feels well  Denies HA, CP, SOB, breathing difficulty, cough  Test Results:        Labs:   Lab Results   Component Value Date    HGB 15 9 06/03/2021    HCT 50 3 (H) 06/03/2021    MCV 84 1 06/03/2021     06/03/2021    WBC 8 4 06/03/2021     Lab Results   Component Value Date     11/30/2017     11/30/2017     11/30/2017     11/30/2017    K 4 6 06/03/2021     06/03/2021    CO2 31 06/03/2021    BUN 29 (H) 06/03/2021    CREATININE 1 53 (H) 06/03/2021    CALCIUM 10 2 06/03/2021    CORRECTEDCA 10 5 (H) 02/05/2018    AST 18 06/03/2021    ALT 13 06/03/2021    ALKPHOS 53 06/03/2021    PROT 7 1 11/30/2017    PROT 7 1 11/30/2017    PROT 7 1 11/30/2017    PROT 7 1 11/30/2017    BILITOT 0 5 11/30/2017    BILITOT 0 5 11/30/2017    BILITOT 0 5 11/30/2017    BILITOT 0 5 11/30/2017       Imaging: No results found  ROS:  As mentioned in HPI & Interval History otherwise 14 point ROS negative  Allergies:    Allergies   Allergen Reactions    Amlodipine Hives    Penicillins Hives    Fenofibrate Itching and Rash     Current Medications: Reviewed  PMH/FH/SH:  Reviewed      Physical Exam:    2 07 meters squared    Ht Readings from Last 3 Encounters:   06/10/21 5' 10" (1 778 m)   04/16/21 5' 10" (1 778 m)   01/06/21 5' 10" (1 778 m)        Wt Readings from Last 3 Encounters:   04/16/21 88 9 kg (196 lb)   01/06/21 90 3 kg (199 lb)   12/16/20 90 3 kg (199 lb)        Temp Readings from Last 3 Encounters:   01/06/21 97 8 °F (36 6 °C) (Temporal)   12/16/20 97 5 °F (36 4 °C) (Temporal)   10/16/20 (!) 96 9 °F (36 1 °C)        BP Readings from Last 3 Encounters:   04/16/21 140/72   01/06/21 144/88   12/16/20 140/82           Physical Exam  Constitutional:       Appearance: He is well-developed  HENT:      Head: Normocephalic and atraumatic  Cardiovascular:      Rate and Rhythm: Normal rate and regular rhythm  Heart sounds: Normal heart sounds  No murmur  Pulmonary:      Effort: Pulmonary effort is normal  No respiratory distress  Breath sounds: Normal breath sounds  No wheezing, rhonchi or rales  Abdominal:      Palpations: Abdomen is soft  Skin:     General: Skin is warm and dry  Neurological:      Mental Status: He is alert and oriented to person, place, and time     Psychiatric:         Behavior: Behavior normal          ECOG:       Emergency Contacts:    [address] [place] [address], [phone],

## 2021-06-17 LAB
ALBUMIN SERPL-MCNC: 4.2 G/DL (ref 3.6–5.1)
ALBUMIN/GLOB SERPL: 1.6 (CALC) (ref 1–2.5)
ALP SERPL-CCNC: 52 U/L (ref 35–144)
ALT SERPL-CCNC: 14 U/L (ref 9–46)
AST SERPL-CCNC: 20 U/L (ref 10–35)
BASOPHILS # BLD AUTO: 43 CELLS/UL (ref 0–200)
BASOPHILS NFR BLD AUTO: 0.5 %
BILIRUB SERPL-MCNC: 0.7 MG/DL (ref 0.2–1.2)
BUN SERPL-MCNC: 26 MG/DL (ref 7–25)
BUN/CREAT SERPL: 16 (CALC) (ref 6–22)
CALCIUM SERPL-MCNC: 10.2 MG/DL (ref 8.6–10.3)
CHLORIDE SERPL-SCNC: 103 MMOL/L (ref 98–110)
CHOLEST SERPL-MCNC: 189 MG/DL
CHOLEST/HDLC SERPL: 4.6 (CALC)
CO2 SERPL-SCNC: 29 MMOL/L (ref 20–32)
CREAT SERPL-MCNC: 1.59 MG/DL (ref 0.7–1.25)
EOSINOPHIL # BLD AUTO: 378 CELLS/UL (ref 15–500)
EOSINOPHIL NFR BLD AUTO: 4.4 %
ERYTHROCYTE [DISTWIDTH] IN BLOOD BY AUTOMATED COUNT: 14 % (ref 11–15)
GLOBULIN SER CALC-MCNC: 2.7 G/DL (CALC) (ref 1.9–3.7)
GLUCOSE SERPL-MCNC: 80 MG/DL (ref 65–99)
HCT VFR BLD AUTO: 49.4 % (ref 38.5–50)
HDLC SERPL-MCNC: 41 MG/DL
HGB BLD-MCNC: 15.4 G/DL (ref 13.2–17.1)
LDLC SERPL CALC-MCNC: 122 MG/DL (CALC)
LYMPHOCYTES # BLD AUTO: 1978 CELLS/UL (ref 850–3900)
LYMPHOCYTES NFR BLD AUTO: 23 %
MCH RBC QN AUTO: 25.5 PG (ref 27–33)
MCHC RBC AUTO-ENTMCNC: 31.2 G/DL (ref 32–36)
MCV RBC AUTO: 81.9 FL (ref 80–100)
MONOCYTES # BLD AUTO: 1032 CELLS/UL (ref 200–950)
MONOCYTES NFR BLD AUTO: 12 %
NEUTROPHILS # BLD AUTO: 5169 CELLS/UL (ref 1500–7800)
NEUTROPHILS NFR BLD AUTO: 60.1 %
NONHDLC SERPL-MCNC: 148 MG/DL (CALC)
PLATELET # BLD AUTO: 178 THOUSAND/UL (ref 140–400)
PMV BLD REES-ECKER: 10.3 FL (ref 7.5–12.5)
POTASSIUM SERPL-SCNC: 4.4 MMOL/L (ref 3.5–5.3)
PROT SERPL-MCNC: 6.9 G/DL (ref 6.1–8.1)
RBC # BLD AUTO: 6.03 MILLION/UL (ref 4.2–5.8)
SL AMB EGFR AFRICAN AMERICAN: 53 ML/MIN/1.73M2
SL AMB EGFR NON AFRICAN AMERICAN: 46 ML/MIN/1.73M2
SODIUM SERPL-SCNC: 139 MMOL/L (ref 135–146)
TRIGL SERPL-MCNC: 138 MG/DL
TSH SERPL-ACNC: 1.75 MIU/L (ref 0.4–4.5)
WBC # BLD AUTO: 8.6 THOUSAND/UL (ref 3.8–10.8)

## 2021-06-23 ENCOUNTER — OFFICE VISIT (OUTPATIENT)
Dept: FAMILY MEDICINE CLINIC | Facility: CLINIC | Age: 66
End: 2021-06-23
Payer: COMMERCIAL

## 2021-06-23 VITALS
BODY MASS INDEX: 27.83 KG/M2 | TEMPERATURE: 98.1 F | DIASTOLIC BLOOD PRESSURE: 78 MMHG | HEIGHT: 71 IN | SYSTOLIC BLOOD PRESSURE: 132 MMHG | WEIGHT: 198.8 LBS | HEART RATE: 80 BPM

## 2021-06-23 DIAGNOSIS — H61.21 HEARING LOSS DUE TO CERUMEN IMPACTION, RIGHT: ICD-10-CM

## 2021-06-23 DIAGNOSIS — E78.2 MIXED HYPERLIPIDEMIA: ICD-10-CM

## 2021-06-23 DIAGNOSIS — I10 ESSENTIAL HYPERTENSION: Primary | ICD-10-CM

## 2021-06-23 DIAGNOSIS — C61 ADENOCARCINOMA OF PROSTATE (HCC): ICD-10-CM

## 2021-06-23 PROCEDURE — 99214 OFFICE O/P EST MOD 30 MIN: CPT | Performed by: PHYSICIAN ASSISTANT

## 2021-06-23 PROCEDURE — 3075F SYST BP GE 130 - 139MM HG: CPT | Performed by: PHYSICIAN ASSISTANT

## 2021-06-23 PROCEDURE — 69210 REMOVE IMPACTED EAR WAX UNI: CPT | Performed by: PHYSICIAN ASSISTANT

## 2021-06-23 PROCEDURE — 1036F TOBACCO NON-USER: CPT | Performed by: PHYSICIAN ASSISTANT

## 2021-06-23 PROCEDURE — 3008F BODY MASS INDEX DOCD: CPT | Performed by: PHYSICIAN ASSISTANT

## 2021-06-23 PROCEDURE — 3078F DIAST BP <80 MM HG: CPT | Performed by: PHYSICIAN ASSISTANT

## 2021-06-23 PROCEDURE — 3725F SCREEN DEPRESSION PERFORMED: CPT | Performed by: PHYSICIAN ASSISTANT

## 2021-06-23 RX ORDER — LOVASTATIN 40 MG/1
TABLET ORAL
Qty: 90 TABLET | Refills: 0 | Status: SHIPPED | OUTPATIENT
Start: 2021-06-23 | End: 2021-09-16

## 2021-06-23 NOTE — PROGRESS NOTES
BMI Counseling: Body mass index is 27 73 kg/m²  The BMI is above normal  Nutrition recommendations include reducing portion sizes

## 2021-06-23 NOTE — PROGRESS NOTES
Assessment and Plan:  Patient Instructions   Assessment/plan:  1  Benign essential hypertension-presently stable with lisinopril, no medication changes  2  Hyperlipidemia-blood work was reviewed in detail with patient  LDL cholesterol is up a little bit at 122  He will continue lovastatin 40 mg daily and increase exercise  3  Chronic kidney disease-stage III-presently stable per Dr Federico Cowan  4  MGUS-stable per Dr Glen Love  5  Adenocarcinoma of the prostate-stable with PSA of 4 4  Follows with Dr Yoon Champagne  6  Cerumen impaction-lavage in the office with success  See procedure note  Problem List Items Addressed This Visit        Cardiovascular and Mediastinum    Essential hypertension - Primary    Relevant Orders    CBC and differential    Comprehensive metabolic panel    Lipid Panel with Direct LDL reflex    TSH, 3rd generation       Genitourinary    Adenocarcinoma of prostate ([facility] )    Relevant Orders    CBC and differential    Comprehensive metabolic panel    Lipid Panel with Direct LDL reflex    TSH, 3rd generation       Other    Mixed hyperlipidemia    Relevant Orders    CBC and differential    Comprehensive metabolic panel    Lipid Panel with Direct LDL reflex    TSH, 3rd generation      Other Visit Diagnoses     Hearing loss due to cerumen impaction, right        Relevant Orders    Ear cerumen removal                 Diagnoses and all orders for this visit:    Essential hypertension  -     CBC and differential; Future  -     Comprehensive metabolic panel; Future  -     Lipid Panel with Direct LDL reflex; Future  -     TSH, 3rd generation; Future    Mixed hyperlipidemia  -     CBC and differential; Future  -     Comprehensive metabolic panel; Future  -     Lipid Panel with Direct LDL reflex; Future  -     TSH, 3rd generation; Future    Adenocarcinoma of prostate ([facility] )  -     CBC and differential; Future  -     Comprehensive metabolic panel; Future  -     Lipid Panel with Direct LDL reflex;  Future  - TSH, 3rd generation; Future    Hearing loss due to cerumen impaction, right  -     Ear cerumen removal              Subjective:      Patient ID: Miles Alex is a 63 y o  male  CC:    Chief Complaint   Patient presents with    Follow-up     Pt here for 6 mth  f/u to chronic conditions,lab results  HPI:    HPI:  This is a 82-year-old gentleman that presents to the office for follow-up of chronic health conditions including hypertension, hyperlipidemia, prostate cancer, and monoclonal gammopathy of undetermined significance  He does continue to follow with Nephrology for stage 3 chronic kidney disease  He also has been following with Dr Emmanuel Garcia for prostate cancer and was last stable with PSA 4 4  He follows with Dr Katerina Ramos for MGUS  He does complain of some fullness and tinnitus in his right ear today that has been going on for few weeks  The following portions of the patient's history were reviewed and updated as appropriate: allergies, current medications, past family history, past medical history, past social history, past surgical history and problem list       Review of Systems   Constitutional: Negative for chills, fatigue and fever  HENT: Positive for tinnitus  Negative for congestion, ear pain and sinus pressure  Eyes: Negative for visual disturbance  Respiratory: Negative for cough, chest tightness and shortness of breath  Cardiovascular: Negative for chest pain and palpitations  Gastrointestinal: Negative for diarrhea, nausea and vomiting  Endocrine: Negative for polyuria  Genitourinary: Negative for dysuria and frequency  Musculoskeletal: Negative for arthralgias and myalgias  Skin: Negative for pallor and rash  Neurological: Negative for dizziness, weakness, light-headedness, numbness and headaches  Psychiatric/Behavioral: Negative for agitation, behavioral problems and sleep disturbance  All other systems reviewed and are negative          Data to review:       Objective:    Vitals:    06/23/21 0752   BP: 132/78   Pulse: 80   Temp: 98 1 °F (36 7 °C)   Weight: 90 2 kg (198 lb 12 8 oz)   Height: 5' 11" (1 803 m)        Physical Exam  Constitutional:       General: He is not in acute distress  Appearance: He is well-developed  HENT:      Head: Normocephalic and atraumatic  Right Ear: There is impacted cerumen  Left Ear: Tympanic membrane normal    Eyes:      Conjunctiva/sclera: Conjunctivae normal    Cardiovascular:      Rate and Rhythm: Normal rate and regular rhythm  Pulmonary:      Effort: Pulmonary effort is normal    Abdominal:      General: Abdomen is flat  Bowel sounds are normal  There is no distension  Palpations: Abdomen is soft  There is no mass  Musculoskeletal:         General: Normal range of motion  Cervical back: Normal range of motion  Skin:     General: Skin is warm  Findings: No rash  Neurological:      Mental Status: He is alert and oriented to person, place, and time  Psychiatric:         Mood and Affect: Mood normal          Ear cerumen removal    Date/Time: 6/23/2021 8:17 AM  Performed by: Delores Joyce PA-C  Authorized by: Delores Joyce PA-C   Universal Protocol:  Consent: Verbal consent obtained  Risks and benefits: risks, benefits and alternatives were discussed  Consent given by: patient  Time out: Immediately prior to procedure a "time out" was called to verify the correct patient, procedure, equipment, support staff and site/side marked as required    Timeout called at: 6/23/2021 8:10 AM   Patient understanding: patient states understanding of the procedure being performed  Patient consent: the patient's understanding of the procedure matches consent given  Procedure consent: procedure consent matches procedure scheduled  Patient identity confirmed: verbally with patient      Patient location:  Clinic  Procedure details:     Local anesthetic:  None    Location:  R ear    Procedure type: irrigation with instrumentation      Instrumentation: curette    Post-procedure details:     Complication:  None    Hearing quality:  Improved    Patient tolerance of procedure:   Tolerated well, no immediate complications

## 2021-06-23 NOTE — PATIENT INSTRUCTIONS
Assessment/plan:  1  Benign essential hypertension-presently stable with lisinopril, no medication changes  2  Hyperlipidemia-blood work was reviewed in detail with patient  LDL cholesterol is up a little bit at 122  He will continue lovastatin 40 mg daily and increase exercise  3  Chronic kidney disease-stage III-presently stable per Dr Mattie Sosa  4  MGUS-stable per Dr Vinicius Tinajero  5  Adenocarcinoma of the prostate-stable with PSA of 4 4  Follows with Dr Julio Fuller  6  Cerumen impaction-lavage in the office with success  See procedure note

## 2021-09-09 LAB
APPEARANCE UR: CLEAR
BACTERIA UR QL AUTO: ABNORMAL /HPF
BILIRUB UR QL STRIP: NEGATIVE
BUN SERPL-MCNC: 26 MG/DL (ref 7–25)
BUN/CREAT SERPL: 17 (CALC) (ref 6–22)
CALCIUM SERPL-MCNC: 10.4 MG/DL (ref 8.6–10.3)
CHLORIDE SERPL-SCNC: 105 MMOL/L (ref 98–110)
CO2 SERPL-SCNC: 28 MMOL/L (ref 20–32)
COLOR UR: YELLOW
CREAT SERPL-MCNC: 1.55 MG/DL (ref 0.7–1.25)
CREAT UR-MCNC: 124 MG/DL (ref 20–320)
GLUCOSE SERPL-MCNC: 82 MG/DL (ref 65–99)
GLUCOSE UR QL STRIP: NEGATIVE
HGB UR QL STRIP: ABNORMAL
HYALINE CASTS #/AREA URNS LPF: ABNORMAL /LPF
KETONES UR QL STRIP: NEGATIVE
LEUKOCYTE ESTERASE UR QL STRIP: NEGATIVE
NITRITE UR QL STRIP: NEGATIVE
PH UR STRIP: 5.5 [PH] (ref 5–8)
POTASSIUM SERPL-SCNC: 4.2 MMOL/L (ref 3.5–5.3)
PROT UR QL STRIP: ABNORMAL
PROT UR-MCNC: 80 MG/DL (ref 5–25)
PROT/CREAT UR: 0.65 MG/MG CREAT (ref 0.02–0.13)
PROT/CREAT UR: 645 MG/G CREAT (ref 22–128)
RBC #/AREA URNS HPF: ABNORMAL /HPF
SL AMB EGFR AFRICAN AMERICAN: 54 ML/MIN/1.73M2
SL AMB EGFR NON AFRICAN AMERICAN: 47 ML/MIN/1.73M2
SODIUM SERPL-SCNC: 139 MMOL/L (ref 135–146)
SP GR UR STRIP: 1.01 (ref 1–1.03)
SQUAMOUS #/AREA URNS HPF: ABNORMAL /HPF
WBC #/AREA URNS HPF: ABNORMAL /HPF

## 2021-09-16 ENCOUNTER — OFFICE VISIT (OUTPATIENT)
Dept: NEPHROLOGY | Facility: CLINIC | Age: 66
End: 2021-09-16
Payer: COMMERCIAL

## 2021-09-16 VITALS
WEIGHT: 192.4 LBS | HEART RATE: 81 BPM | DIASTOLIC BLOOD PRESSURE: 86 MMHG | SYSTOLIC BLOOD PRESSURE: 148 MMHG | RESPIRATION RATE: 16 BRPM | HEIGHT: 71 IN | BODY MASS INDEX: 26.94 KG/M2

## 2021-09-16 DIAGNOSIS — N18.2 STAGE 2 CHRONIC KIDNEY DISEASE: Primary | ICD-10-CM

## 2021-09-16 DIAGNOSIS — R80.9 PROTEINURIA, UNSPECIFIED TYPE: ICD-10-CM

## 2021-09-16 DIAGNOSIS — E78.2 MIXED HYPERLIPIDEMIA: ICD-10-CM

## 2021-09-16 DIAGNOSIS — I10 ESSENTIAL HYPERTENSION: ICD-10-CM

## 2021-09-16 PROCEDURE — 99214 OFFICE O/P EST MOD 30 MIN: CPT | Performed by: INTERNAL MEDICINE

## 2021-09-16 PROCEDURE — 1036F TOBACCO NON-USER: CPT | Performed by: INTERNAL MEDICINE

## 2021-09-16 PROCEDURE — 3008F BODY MASS INDEX DOCD: CPT | Performed by: INTERNAL MEDICINE

## 2021-09-16 PROCEDURE — 3079F DIAST BP 80-89 MM HG: CPT | Performed by: INTERNAL MEDICINE

## 2021-09-16 PROCEDURE — 3077F SYST BP >= 140 MM HG: CPT | Performed by: INTERNAL MEDICINE

## 2021-09-16 RX ORDER — LOVASTATIN 40 MG/1
TABLET ORAL
Qty: 90 TABLET | Refills: 0 | Status: SHIPPED | OUTPATIENT
Start: 2021-09-16 | End: 2021-12-10

## 2021-09-16 RX ORDER — CLOBETASOL PROPIONATE 0.5 MG/G
1 CREAM TOPICAL 2 TIMES DAILY
COMMUNITY
Start: 2021-08-30 | End: 2022-06-28

## 2021-09-16 NOTE — PATIENT INSTRUCTIONS
1  Chronic kidney disease stage 2/3 in setting of prostatic adenocarcinoma - eGFR ~50s ml/min per CKD EPI equation  Last sCr 1 55 as of 9/8/21  This is stable    -continue to avoid nonsteroidals(motrin, aleve, advil, ibuprofen, toradol, naproxen, celebrex, indomethacin and meloxicam)  -renal u/s August 2017 shows echogenic kidneys with b/l renal cysts  The cause of this is unclear  +trace protein in urine, quantified protein 0 645g  -Check BMP in 3 months as well as urine protein:Cr and microalb:Cr     2  HTN - BP elevated in office  Have correlated home BP cuff in past which appears to be accurate  Continue low salt diet  -on lisinopril 40mg daily  No longer on amlodipine 5mg daily  -Goal BP < 130/80  Call office if BP at home persistently above goal    -monitor BP twice daily  Do not check BP immediately after waking up as it tends to run higher in everyone then  Must check BP after at least 30 minutes of rest  Call office with BP readings next week  BP log provided       3  Proteinuria - noted on UA  Now lower than previous  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   UA shows trace+ protein    -Will continue lisinopril 40mg daily       4  Prostate cancer - Undergoing active surveillance by urology  Last PSA 4 4 - stable     5  microscopic hematuria - does not appear to be glomerular in origin and UA shows only 0-2 RBCs  -May have underlying TBM vs IgAN     6  Hypercalcemia  - corrected calcium 10 4 as of 9/8/21  On cholecalciferol 1000 units daily  Will check PTH and vitamin-D levels  Last 25 hydroxy vitamin-D level 29 as of Dec  2020     RTC in 6 months    Repeat BMP, UA and UpCr in 3 months along with PTH and vitamin D levels  Repeat BMP again prior to next office visit

## 2021-10-04 ENCOUNTER — TELEPHONE (OUTPATIENT)
Dept: NEPHROLOGY | Facility: CLINIC | Age: 66
End: 2021-10-04

## 2021-10-11 LAB — PSA SERPL-MCNC: 4.2 NG/ML

## 2021-10-21 ENCOUNTER — OFFICE VISIT (OUTPATIENT)
Dept: UROLOGY | Facility: CLINIC | Age: 66
End: 2021-10-21
Payer: COMMERCIAL

## 2021-10-21 VITALS
SYSTOLIC BLOOD PRESSURE: 132 MMHG | HEIGHT: 71 IN | DIASTOLIC BLOOD PRESSURE: 74 MMHG | WEIGHT: 194 LBS | BODY MASS INDEX: 27.16 KG/M2 | HEART RATE: 83 BPM

## 2021-10-21 DIAGNOSIS — C61 PROSTATE CANCER (HCC): Primary | ICD-10-CM

## 2021-10-21 PROCEDURE — 3078F DIAST BP <80 MM HG: CPT | Performed by: PHYSICIAN ASSISTANT

## 2021-10-21 PROCEDURE — 3075F SYST BP GE 130 - 139MM HG: CPT | Performed by: PHYSICIAN ASSISTANT

## 2021-10-21 PROCEDURE — 1036F TOBACCO NON-USER: CPT | Performed by: PHYSICIAN ASSISTANT

## 2021-10-21 PROCEDURE — 99214 OFFICE O/P EST MOD 30 MIN: CPT | Performed by: PHYSICIAN ASSISTANT

## 2021-10-21 PROCEDURE — 3008F BODY MASS INDEX DOCD: CPT | Performed by: PHYSICIAN ASSISTANT

## 2021-10-26 ENCOUNTER — TELEPHONE (OUTPATIENT)
Dept: OTHER | Facility: OTHER | Age: 66
End: 2021-10-26

## 2021-10-26 ENCOUNTER — TELEPHONE (OUTPATIENT)
Dept: NEPHROLOGY | Facility: CLINIC | Age: 66
End: 2021-10-26

## 2021-10-26 ENCOUNTER — TELEPHONE (OUTPATIENT)
Dept: HEMATOLOGY ONCOLOGY | Facility: CLINIC | Age: 66
End: 2021-10-26

## 2021-10-27 ENCOUNTER — TELEPHONE (OUTPATIENT)
Dept: OTHER | Facility: OTHER | Age: 66
End: 2021-10-27

## 2021-10-28 ENCOUNTER — TELEPHONE (OUTPATIENT)
Dept: UROLOGY | Facility: CLINIC | Age: 66
End: 2021-10-28

## 2021-10-28 ENCOUNTER — TELEPHONE (OUTPATIENT)
Dept: SURGICAL ONCOLOGY | Facility: CLINIC | Age: 66
End: 2021-10-28

## 2021-11-02 LAB
ALBUMIN SERPL-MCNC: 4 G/DL (ref 3.6–5.1)
ALBUMIN/GLOB SERPL: 1.3 (CALC) (ref 1–2.5)
ALP SERPL-CCNC: 52 U/L (ref 35–144)
ALT SERPL-CCNC: 12 U/L (ref 9–46)
AST SERPL-CCNC: 19 U/L (ref 10–35)
BASOPHILS # BLD AUTO: 39 CELLS/UL (ref 0–200)
BASOPHILS NFR BLD AUTO: 0.5 %
BILIRUB SERPL-MCNC: 0.5 MG/DL (ref 0.2–1.2)
BUN SERPL-MCNC: 29 MG/DL (ref 7–25)
BUN/CREAT SERPL: 17 (CALC) (ref 6–22)
CALCIUM SERPL-MCNC: 10.5 MG/DL (ref 8.6–10.3)
CHLORIDE SERPL-SCNC: 101 MMOL/L (ref 98–110)
CO2 SERPL-SCNC: 32 MMOL/L (ref 20–32)
CREAT SERPL-MCNC: 1.68 MG/DL (ref 0.7–1.25)
EOSINOPHIL # BLD AUTO: 254 CELLS/UL (ref 15–500)
EOSINOPHIL NFR BLD AUTO: 3.3 %
ERYTHROCYTE [DISTWIDTH] IN BLOOD BY AUTOMATED COUNT: 13.7 % (ref 11–15)
GLOBULIN SER CALC-MCNC: 3 G/DL (CALC) (ref 1.9–3.7)
GLUCOSE SERPL-MCNC: 83 MG/DL (ref 65–139)
HAV IGM SERPL QL IA: NORMAL
HBV CORE IGM SERPL QL IA: NORMAL
HBV SURFACE AG SERPL QL IA: NORMAL
HCT VFR BLD AUTO: 46.8 % (ref 38.5–50)
HCV AB S/CO SERPL IA: 0.05
HCV AB SERPL QL IA: NORMAL
HGB BLD-MCNC: 14.8 G/DL (ref 13.2–17.1)
LYMPHOCYTES # BLD AUTO: 1471 CELLS/UL (ref 850–3900)
LYMPHOCYTES NFR BLD AUTO: 19.1 %
M TB IFN-G CD4+ BCKGRND COR BLD-ACNC: <0 IU/ML
M TB IFN-G CD4+ BCKGRND COR BLD-ACNC: <0 IU/ML
M TB IFN-G CD4+ T-CELLS BLD-ACNC: 0.02 IU/ML
M TB TUBERC IFN-G BLD QL: NEGATIVE
M TB TUBERC IGNF/MITOGEN IGNF CONTROL: >10 IU/ML
MCH RBC QN AUTO: 25.9 PG (ref 27–33)
MCHC RBC AUTO-ENTMCNC: 31.6 G/DL (ref 32–36)
MCV RBC AUTO: 82 FL (ref 80–100)
MONOCYTES # BLD AUTO: 778 CELLS/UL (ref 200–950)
MONOCYTES NFR BLD AUTO: 10.1 %
NEUTROPHILS # BLD AUTO: 5159 CELLS/UL (ref 1500–7800)
NEUTROPHILS NFR BLD AUTO: 67 %
PLATELET # BLD AUTO: 214 THOUSAND/UL (ref 140–400)
PMV BLD REES-ECKER: 9.8 FL (ref 7.5–12.5)
POTASSIUM SERPL-SCNC: 4.2 MMOL/L (ref 3.5–5.3)
PROT SERPL-MCNC: 7 G/DL (ref 6.1–8.1)
RBC # BLD AUTO: 5.71 MILLION/UL (ref 4.2–5.8)
SL AMB EGFR AFRICAN AMERICAN: 49 ML/MIN/1.73M2
SL AMB EGFR NON AFRICAN AMERICAN: 42 ML/MIN/1.73M2
SODIUM SERPL-SCNC: 138 MMOL/L (ref 135–146)
WBC # BLD AUTO: 7.7 THOUSAND/UL (ref 3.8–10.8)

## 2021-12-10 DIAGNOSIS — E78.2 MIXED HYPERLIPIDEMIA: ICD-10-CM

## 2021-12-10 RX ORDER — LOVASTATIN 40 MG/1
TABLET ORAL
Qty: 90 TABLET | Refills: 0 | Status: SHIPPED | OUTPATIENT
Start: 2021-12-10 | End: 2022-03-07

## 2021-12-14 DIAGNOSIS — I10 HYPERTENSION, UNSPECIFIED TYPE: ICD-10-CM

## 2021-12-14 RX ORDER — LISINOPRIL 40 MG/1
40 TABLET ORAL DAILY
Qty: 30 TABLET | Refills: 3 | Status: SHIPPED | OUTPATIENT
Start: 2021-12-14 | End: 2021-12-14 | Stop reason: SDUPTHER

## 2021-12-14 RX ORDER — LISINOPRIL 40 MG/1
40 TABLET ORAL DAILY
Qty: 90 TABLET | Refills: 1 | Status: SHIPPED | OUTPATIENT
Start: 2021-12-14 | End: 2022-06-13

## 2021-12-27 LAB
ALBUMIN SERPL-MCNC: 4.1 G/DL (ref 3.6–5.1)
ALBUMIN/GLOB SERPL: 1.3 (CALC) (ref 1–2.5)
ALP SERPL-CCNC: 55 U/L (ref 35–144)
ALT SERPL-CCNC: 15 U/L (ref 9–46)
AST SERPL-CCNC: 19 U/L (ref 10–35)
BASOPHILS # BLD AUTO: 30 CELLS/UL (ref 0–200)
BASOPHILS NFR BLD AUTO: 0.3 %
BILIRUB SERPL-MCNC: 0.5 MG/DL (ref 0.2–1.2)
BUN SERPL-MCNC: 25 MG/DL (ref 7–25)
BUN/CREAT SERPL: 14 (CALC) (ref 6–22)
CALCIUM SERPL-MCNC: 10.1 MG/DL (ref 8.6–10.3)
CHLORIDE SERPL-SCNC: 106 MMOL/L (ref 98–110)
CHOLEST SERPL-MCNC: 207 MG/DL
CHOLEST/HDLC SERPL: 5.8 (CALC)
CO2 SERPL-SCNC: 28 MMOL/L (ref 20–32)
CREAT SERPL-MCNC: 1.73 MG/DL (ref 0.7–1.25)
EOSINOPHIL # BLD AUTO: 436 CELLS/UL (ref 15–500)
EOSINOPHIL NFR BLD AUTO: 4.4 %
ERYTHROCYTE [DISTWIDTH] IN BLOOD BY AUTOMATED COUNT: 14.1 % (ref 11–15)
GLOBULIN SER CALC-MCNC: 3.1 G/DL (CALC) (ref 1.9–3.7)
GLUCOSE SERPL-MCNC: 84 MG/DL (ref 65–99)
HCT VFR BLD AUTO: 47.9 % (ref 38.5–50)
HDLC SERPL-MCNC: 36 MG/DL
HGB BLD-MCNC: 15.4 G/DL (ref 13.2–17.1)
LDLC SERPL CALC-MCNC: 134 MG/DL (CALC)
LYMPHOCYTES # BLD AUTO: 2218 CELLS/UL (ref 850–3900)
LYMPHOCYTES NFR BLD AUTO: 22.4 %
MCH RBC QN AUTO: 26.6 PG (ref 27–33)
MCHC RBC AUTO-ENTMCNC: 32.2 G/DL (ref 32–36)
MCV RBC AUTO: 82.6 FL (ref 80–100)
MONOCYTES # BLD AUTO: 1218 CELLS/UL (ref 200–950)
MONOCYTES NFR BLD AUTO: 12.3 %
NEUTROPHILS # BLD AUTO: 5999 CELLS/UL (ref 1500–7800)
NEUTROPHILS NFR BLD AUTO: 60.6 %
NONHDLC SERPL-MCNC: 171 MG/DL (CALC)
PLATELET # BLD AUTO: 181 THOUSAND/UL (ref 140–400)
PMV BLD REES-ECKER: 10.6 FL (ref 7.5–12.5)
POTASSIUM SERPL-SCNC: 4.3 MMOL/L (ref 3.5–5.3)
PROT SERPL-MCNC: 7.2 G/DL (ref 6.1–8.1)
RBC # BLD AUTO: 5.8 MILLION/UL (ref 4.2–5.8)
SL AMB EGFR AFRICAN AMERICAN: 47 ML/MIN/1.73M2
SL AMB EGFR NON AFRICAN AMERICAN: 41 ML/MIN/1.73M2
SODIUM SERPL-SCNC: 140 MMOL/L (ref 135–146)
TRIGL SERPL-MCNC: 232 MG/DL
TSH SERPL-ACNC: 2.13 MIU/L (ref 0.4–4.5)
WBC # BLD AUTO: 9.9 THOUSAND/UL (ref 3.8–10.8)

## 2021-12-29 ENCOUNTER — IMMUNIZATIONS (OUTPATIENT)
Dept: FAMILY MEDICINE CLINIC | Facility: HOSPITAL | Age: 66
End: 2021-12-29

## 2021-12-29 DIAGNOSIS — Z23 ENCOUNTER FOR IMMUNIZATION: Primary | ICD-10-CM

## 2021-12-29 PROCEDURE — 0064A COVID-19 MODERNA VACC 0.25 ML BOOSTER: CPT

## 2021-12-29 PROCEDURE — 91306 COVID-19 MODERNA VACC 0.25 ML BOOSTER: CPT

## 2022-01-06 ENCOUNTER — OFFICE VISIT (OUTPATIENT)
Dept: FAMILY MEDICINE CLINIC | Facility: CLINIC | Age: 67
End: 2022-01-06
Payer: COMMERCIAL

## 2022-01-06 VITALS
WEIGHT: 190.8 LBS | BODY MASS INDEX: 26.61 KG/M2 | RESPIRATION RATE: 16 BRPM | SYSTOLIC BLOOD PRESSURE: 118 MMHG | DIASTOLIC BLOOD PRESSURE: 70 MMHG

## 2022-01-06 DIAGNOSIS — C61 ADENOCARCINOMA OF PROSTATE (HCC): ICD-10-CM

## 2022-01-06 DIAGNOSIS — I10 ESSENTIAL HYPERTENSION: ICD-10-CM

## 2022-01-06 DIAGNOSIS — E78.2 MIXED HYPERLIPIDEMIA: Primary | ICD-10-CM

## 2022-01-06 DIAGNOSIS — L40.9 PSORIASIS: ICD-10-CM

## 2022-01-06 PROCEDURE — 99214 OFFICE O/P EST MOD 30 MIN: CPT | Performed by: PHYSICIAN ASSISTANT

## 2022-01-06 PROCEDURE — 3074F SYST BP LT 130 MM HG: CPT | Performed by: PHYSICIAN ASSISTANT

## 2022-01-06 PROCEDURE — 3078F DIAST BP <80 MM HG: CPT | Performed by: PHYSICIAN ASSISTANT

## 2022-01-06 NOTE — PATIENT INSTRUCTIONS
Assessment/ plan:  1  Mixed hyperlipidemia  -Not at ideal goal   Patient is on lovastatin  He is encouraged to watch diet and exercise  If his numbers are not improving next visit consider stronger statin therapy  2   Benign essential hypertension -stable with lisinopril, no medication changes  3   Adenocarcinoma of the prostate  -Stable per Urology, Dr Mechelle Lee  4  Colon polyps-patient had colonoscopy  Done in August 2018  He will be due within 5 years  Follow-up with Dr Ayala    5  Psoriasis - stable per Dermatology on [facility]

## 2022-01-06 NOTE — PROGRESS NOTES
Assessment and Plan:  Patient Instructions    Assessment/ plan:  1  Mixed hyperlipidemia  -Not at ideal goal   Patient is on lovastatin  He is encouraged to watch diet and exercise  If his numbers are not improving next visit consider stronger statin therapy  2   Benign essential hypertension -stable with lisinopril, no medication changes  3   Adenocarcinoma of the prostate  -Stable per Urology, Dr Emmanuel Garcia  4  Colon polyps-patient had colonoscopy  Done in August 2018  He will be due within 5 years  Follow-up with Dr Dylan Guillen  5  Psoriasis - stable per Dermatology on [place]      Problem List Items Addressed This Visit        Cardiovascular and Mediastinum    Essential hypertension    Relevant Orders    CBC and differential    Comprehensive metabolic panel    Lipid Panel with Direct LDL reflex    TSH, 3rd generation    PSA, Total Screen       Musculoskeletal and Integument    Psoriasis    Relevant Medications    Guselkumab 100 MG/ML SOSY    Other Relevant Orders    CBC and differential    Comprehensive metabolic panel    Lipid Panel with Direct LDL reflex    TSH, 3rd generation    PSA, Total Screen       Genitourinary    Adenocarcinoma of prostate (HCC)    Relevant Orders    CBC and differential    Comprehensive metabolic panel    Lipid Panel with Direct LDL reflex    TSH, 3rd generation    PSA, Total Screen       Other    Mixed hyperlipidemia - Primary    Relevant Orders    CBC and differential    Comprehensive metabolic panel    Lipid Panel with Direct LDL reflex    TSH, 3rd generation    PSA, Total Screen                 Diagnoses and all orders for this visit:    Mixed hyperlipidemia  -     CBC and differential; Future  -     Comprehensive metabolic panel; Future  -     Lipid Panel with Direct LDL reflex; Future  -     TSH, 3rd generation; Future  -     PSA, Total Screen; Future    Essential hypertension  -     CBC and differential; Future  -     Comprehensive metabolic panel;  Future  -     Lipid Panel with Direct LDL reflex; Future  -     TSH, 3rd generation; Future  -     PSA, Total Screen; Future    Adenocarcinoma of prostate ([facility] )  -     CBC and differential; Future  -     Comprehensive metabolic panel; Future  -     Lipid Panel with Direct LDL reflex; Future  -     TSH, 3rd generation; Future  -     PSA, Total Screen; Future    Psoriasis  -     CBC and differential; Future  -     Comprehensive metabolic panel; Future  -     Lipid Panel with Direct LDL reflex; Future  -     TSH, 3rd generation; Future  -     PSA, Total Screen; Future  -     Guselkumab 100 MG/ML SOSY; Inject per derm q 8 weeks              Subjective:      Patient ID: Reji Garcia is a 61 y o  male  CC:    No chief complaint on file  HPI:     HPI:  This is a 82-year-old gentleman that presents to the office for follow-up of chronic health conditions  He has been feeling well without any acute complaints  He does have a history of hyperlipidemia and continues on lovastatin  His blood pressure has been controlled with lisinopril  He also has history of prostate cancer and continues to follow with Dr Sue Barillas  He has also a history of psoriasis and continues to follow with dermatology  He is currently on a new injectable therapy which he is getting every 8 weeks roughly and it seems to be helpful  He also has some history of white blood cell abnormality and continues to follow with Dr Shruthi Noriega  The following portions of the patient's history were reviewed and updated as appropriate: allergies, current medications, past family history, past medical history, past social history, past surgical history and problem list       Review of Systems   Constitutional: Negative for chills, fatigue and fever  HENT: Negative for congestion, ear pain and sinus pressure  Eyes: Negative for visual disturbance  Respiratory: Negative for cough, chest tightness and shortness of breath      Cardiovascular: Negative for chest pain and palpitations  Gastrointestinal: Negative for diarrhea, nausea and vomiting  Endocrine: Negative for polyuria  Genitourinary: Negative for dysuria and frequency  Musculoskeletal: Negative for arthralgias and myalgias  Skin: Negative for pallor and rash  Neurological: Negative for dizziness, weakness, light-headedness, numbness and headaches  Psychiatric/Behavioral: Negative for agitation, behavioral problems and sleep disturbance  All other systems reviewed and are negative  Data to review:       Objective:    Vitals:    01/06/22 0748   BP: 118/70   Resp: 16   Weight: 86 5 kg (190 lb 12 8 oz)        Physical Exam  Constitutional:       General: He is not in acute distress  Appearance: He is well-developed  HENT:      Head: Normocephalic and atraumatic  Right Ear: Tympanic membrane normal       Left Ear: Tympanic membrane normal    Eyes:      Conjunctiva/sclera: Conjunctivae normal    Cardiovascular:      Rate and Rhythm: Normal rate and regular rhythm  Pulmonary:      Effort: Pulmonary effort is normal    Abdominal:      General: Abdomen is flat  Bowel sounds are normal  There is no distension  Palpations: Abdomen is soft  There is no mass  Musculoskeletal:         General: Normal range of motion  Cervical back: Normal range of motion  Skin:     General: Skin is warm  Findings: No rash  Neurological:      Mental Status: He is alert and oriented to person, place, and time  Psychiatric:         Mood and Affect: Mood normal            BMI Counseling: Body mass index is 26 61 kg/m²  The BMI is above normal  Nutrition recommendations include decreasing portion sizes  Exercise recommendations include exercising 3-5 times per week  Rationale for BMI follow-up plan is due to patient being overweight or obese

## 2022-01-12 ENCOUNTER — TELEPHONE (OUTPATIENT)
Dept: NEPHROLOGY | Facility: CLINIC | Age: 67
End: 2022-01-12

## 2022-02-17 ENCOUNTER — TELEPHONE (OUTPATIENT)
Dept: NEPHROLOGY | Facility: CLINIC | Age: 67
End: 2022-02-17

## 2022-03-05 DIAGNOSIS — E78.2 MIXED HYPERLIPIDEMIA: ICD-10-CM

## 2022-03-07 RX ORDER — LOVASTATIN 40 MG/1
TABLET ORAL
Qty: 90 TABLET | Refills: 0 | Status: SHIPPED | OUTPATIENT
Start: 2022-03-07 | End: 2022-06-06

## 2022-03-07 NOTE — TELEPHONE ENCOUNTER
Requested Prescriptions     Pending Prescriptions Disp Refills    lovastatin (MEVACOR) 40 MG tablet [Pharmacy Med Name: LOVASTATIN 40 MG TABLET] 90 tablet 0     Sig: TAKE 1 TABLET BY MOUTH EVERY DAY     LOV 1/6/22, F/U 7/14/22, Labs pending

## 2022-03-08 LAB
25(OH)D3 SERPL-MCNC: 51 NG/ML (ref 30–100)
BACTERIA UR QL AUTO: NORMAL /HPF
BUN SERPL-MCNC: 23 MG/DL (ref 7–25)
BUN/CREAT SERPL: 14 (CALC) (ref 6–22)
CALCIUM SERPL-MCNC: 10.1 MG/DL (ref 8.6–10.3)
CALCIUM SERPL-MCNC: 10.1 MG/DL (ref 8.6–10.3)
CHLORIDE SERPL-SCNC: 103 MMOL/L (ref 98–110)
CO2 SERPL-SCNC: 27 MMOL/L (ref 20–32)
CREAT SERPL-MCNC: 1.64 MG/DL (ref 0.7–1.25)
CREAT UR-MCNC: 82 MG/DL (ref 20–320)
GLUCOSE SERPL-MCNC: 79 MG/DL (ref 65–99)
HYALINE CASTS #/AREA URNS LPF: NORMAL /LPF
POTASSIUM SERPL-SCNC: 4.2 MMOL/L (ref 3.5–5.3)
PROT UR-MCNC: 92 MG/DL (ref 5–25)
PROT/CREAT UR: 1.12 MG/MG CREAT (ref 0.02–0.13)
PROT/CREAT UR: 1122 MG/G CREAT (ref 22–128)
PTH-INTACT SERPL-MCNC: 52 PG/ML (ref 14–64)
RBC #/AREA URNS HPF: NORMAL /HPF
SL AMB EGFR AFRICAN AMERICAN: 50 ML/MIN/1.73M2
SL AMB EGFR NON AFRICAN AMERICAN: 44 ML/MIN/1.73M2
SODIUM SERPL-SCNC: 139 MMOL/L (ref 135–146)
SQUAMOUS #/AREA URNS HPF: NORMAL /HPF
WBC #/AREA URNS HPF: NORMAL /HPF

## 2022-03-09 LAB
ALBUMIN SERPL ELPH-MCNC: 4.1 G/DL (ref 3.8–4.8)
ALBUMIN SERPL-MCNC: 4 G/DL (ref 3.6–5.1)
ALBUMIN/GLOB SERPL: 1.3 (CALC) (ref 1–2.5)
ALP SERPL-CCNC: 52 U/L (ref 35–144)
ALPHA1 GLOB SERPL ELPH-MCNC: 0.3 G/DL (ref 0.2–0.3)
ALPHA2 GLOB SERPL ELPH-MCNC: 0.6 G/DL (ref 0.5–0.9)
ALT SERPL-CCNC: 12 U/L (ref 9–46)
AST SERPL-CCNC: 19 U/L (ref 10–35)
BASOPHILS # BLD AUTO: 28 CELLS/UL (ref 0–200)
BASOPHILS NFR BLD AUTO: 0.4 %
BETA1 GLOB SERPL ELPH-MCNC: 0.4 G/DL (ref 0.4–0.6)
BETA2 GLOB SERPL ELPH-MCNC: 0.3 G/DL (ref 0.2–0.5)
BILIRUB SERPL-MCNC: 0.6 MG/DL (ref 0.2–1.2)
BUN SERPL-MCNC: 24 MG/DL (ref 7–25)
BUN/CREAT SERPL: 15 (CALC) (ref 6–22)
CALCIUM SERPL-MCNC: 10.2 MG/DL (ref 8.6–10.3)
CHLORIDE SERPL-SCNC: 104 MMOL/L (ref 98–110)
CO2 SERPL-SCNC: 27 MMOL/L (ref 20–32)
CREAT SERPL-MCNC: 1.63 MG/DL (ref 0.7–1.25)
EOSINOPHIL # BLD AUTO: 341 CELLS/UL (ref 15–500)
EOSINOPHIL NFR BLD AUTO: 4.8 %
ERYTHROCYTE [DISTWIDTH] IN BLOOD BY AUTOMATED COUNT: 14.2 % (ref 11–15)
GAMMA GLOB SERPL ELPH-MCNC: 1.3 G/DL (ref 0.8–1.7)
GLOBULIN SER CALC-MCNC: 3 G/DL (CALC) (ref 1.9–3.7)
GLUCOSE SERPL-MCNC: 77 MG/DL (ref 65–99)
HCT VFR BLD AUTO: 46.5 % (ref 38.5–50)
HGB BLD-MCNC: 15.1 G/DL (ref 13.2–17.1)
IGA SERPL-MCNC: 188 MG/DL (ref 70–320)
IGG SERPL-MCNC: 1391 MG/DL (ref 600–1540)
IGM SERPL-MCNC: 58 MG/DL (ref 50–300)
KAPPA LC FREE SER-MCNC: 65.1 MG/L (ref 3.3–19.4)
KAPPA LC FREE/LAMBDA FREE SER: 3.58 {RATIO} (ref 0.26–1.65)
LAMBDA LC FREE SERPL-MCNC: 18.2 MG/L (ref 5.7–26.3)
LYMPHOCYTES # BLD AUTO: 1669 CELLS/UL (ref 850–3900)
LYMPHOCYTES NFR BLD AUTO: 23.5 %
M PROTEIN 1 SERPL ELPH-MCNC: 0.5 G/DL
MCH RBC QN AUTO: 26 PG (ref 27–33)
MCHC RBC AUTO-ENTMCNC: 32.5 G/DL (ref 32–36)
MCV RBC AUTO: 80 FL (ref 80–100)
MONOCYTES # BLD AUTO: 930 CELLS/UL (ref 200–950)
MONOCYTES NFR BLD AUTO: 13.1 %
NEUTROPHILS # BLD AUTO: 4132 CELLS/UL (ref 1500–7800)
NEUTROPHILS NFR BLD AUTO: 58.2 %
PLATELET # BLD AUTO: 168 THOUSAND/UL (ref 140–400)
PMV BLD REES-ECKER: 9.9 FL (ref 7.5–12.5)
POTASSIUM SERPL-SCNC: 4.2 MMOL/L (ref 3.5–5.3)
PROT SERPL-MCNC: 7 G/DL (ref 6.1–8.1)
PROT SERPL-MCNC: 7 G/DL (ref 6.1–8.1)
RBC # BLD AUTO: 5.81 MILLION/UL (ref 4.2–5.8)
SL AMB EGFR AFRICAN AMERICAN: 51 ML/MIN/1.73M2
SL AMB EGFR NON AFRICAN AMERICAN: 44 ML/MIN/1.73M2
SODIUM SERPL-SCNC: 138 MMOL/L (ref 135–146)
WBC # BLD AUTO: 7.1 THOUSAND/UL (ref 3.8–10.8)

## 2022-03-16 ENCOUNTER — OFFICE VISIT (OUTPATIENT)
Dept: HEMATOLOGY ONCOLOGY | Facility: CLINIC | Age: 67
End: 2022-03-16
Payer: COMMERCIAL

## 2022-03-16 VITALS
SYSTOLIC BLOOD PRESSURE: 152 MMHG | TEMPERATURE: 97 F | BODY MASS INDEX: 26.74 KG/M2 | WEIGHT: 191 LBS | OXYGEN SATURATION: 97 % | RESPIRATION RATE: 18 BRPM | HEIGHT: 71 IN | HEART RATE: 90 BPM | DIASTOLIC BLOOD PRESSURE: 90 MMHG

## 2022-03-16 DIAGNOSIS — D47.2 MGUS (MONOCLONAL GAMMOPATHY OF UNKNOWN SIGNIFICANCE): Primary | ICD-10-CM

## 2022-03-16 PROCEDURE — 99214 OFFICE O/P EST MOD 30 MIN: CPT | Performed by: INTERNAL MEDICINE

## 2022-03-16 NOTE — PROGRESS NOTES
Hematology Outpatient Follow - Up Note  Arianna Candelario 61 y o  male MRN: @ Encounter: [id number]        Date:  3/16/2022        Assessment/ Plan: IgG kappa monoclonal gammopathy of undetermined significance dx 12/2019  M protein 0 3 gram/deciliter at presentation  0 4 6/3/21  Serum kappa free light chain mildly elevated at 41 with ratio 1 9  Normal quantitative immunoglobulins 6/2021           He had a history of psoriasis, might be related to continuous inflammatory process, however kappa light chain is increasing, we have to do a bone marrow biopsy and aspirate confirm MGUS versus myeloma     Elevated hemoglobin secondary to obstructive sleep apnea, workup was negative for JAK2 mutation, MPL 1 mutation, CALR mutation hemoglobin electrophoresis was reported normal    Chronic kidney disease    Deanna score 6 prostate cancer small volume diagnosed in 2017, he is on active surveillance followed by Urology        Labs and imaging studies are reviewed by ordering provider once results are available  If there are findings that need immediate attention, you will be contacted when results available  Discussing results and the implication on your healthcare is best discussed in person at your follow-up visit  HPI:    Starrnaomi Cainmami a 61 y o  seen for initial consultation 12/5/2019 at the referral of Jessi Arreguin DO regarding MGUS and elevated H/H        10/21/2019:  SPEP with immunofixation identified IgG kappa monoclonal band measuring 0 3 gram/deciliter     9/4/2019:  Normal quantitative immunoglobulins   Creatinine 1 61 otherwise normal CMP    Creatinine has been steadily been increasing slowly since 2016  Asad Shell follows with Dr Eran Capps regarding his CKD     Hemoglobin 16 3, hematocrit 51 2, white blood cell count 8 2 with normal differential, platelet count 897     He feels well   Denies any fevers, chills, sweats  Harpal Montoya has been stable     He has been retired since 64years old  [employer] frequently with his wife who is also retired      Does not smoke or drink      He has psoriasis followed by Dr Megan Quevedo        He follows with Dr Arauz Bring volume Deanna 6 prostate cancer diagnosed on an initial prostate biopsy in 2017  In 2018,a surveillance biopsy again showed 3 of 12 cores with Deanna 6 prostate cancer   He is on active surveillance     Workup for MGUS 12/2019 showed normal quantitative immunoglobulins, kappa light chain 14 7, lambda light chain 11 1, erythropoietin 5 4, negative for JAK2 mutation profile as well as MPL 1, CALR for polycythemia, hemoglobin electrophoresis normal       Component      Latest Ref Rng & Units 11/20/2017 12/16/2019   IMMUNOGLOBULIN KAPPA FREE LIGHT CHAIN      3 3 - 19 4 mg/L 23 8 (H) 14 7   IMMUNOGLOBULIN LAMBDA FREE LIGHT CHAIN      5 7 - 26 3 mg/L 18 8 11 1   KAPPA/LAMBDA FREE LIGHT CHAIN RATIO      0 26 - 1 65 1 27 1 32     Component      Latest Ref Rng & Units 7/13/2020 6/3/2021 3/7/2022   IMMUNOGLOBULIN KAPPA FREE LIGHT CHAIN      3 3 - 19 4 mg/L 29 6 (H) 41 5 (H) 65 1 (H)   IMMUNOGLOBULIN LAMBDA FREE LIGHT CHAIN      5 7 - 26 3 mg/L 16 5 21 8 18 2   KAPPA/LAMBDA FREE LIGHT CHAIN RATIO      0 26 - 1 65 1 79 (H) 1 90 (H) 3 58 (H)       Previous Treatment:         Test Results:    Imaging: No results found      Labs:   Lab Results   Component Value Date    WBC 7 1 03/07/2022    HGB 15 1 03/07/2022    HCT 46 5 03/07/2022    MCV 80 0 03/07/2022     03/07/2022     Lab Results   Component Value Date     11/30/2017     11/30/2017     11/30/2017     11/30/2017    K 4 2 03/07/2022     03/07/2022    CO2 27 03/07/2022    BUN 23 03/07/2022    CREATININE 1 64 (H) 03/07/2022    CALCIUM 10 1 03/07/2022    CALCIUM 10 1 03/07/2022    CORRECTEDCA 10 5 (H) 02/05/2018    AST 19 03/07/2022    ALT 12 03/07/2022    ALKPHOS 52 03/07/2022    PROT 7 1 11/30/2017    PROT 7 1 11/30/2017    PROT 7 1 11/30/2017    PROT 7 1 11/30/2017 BILITOT 0 5 11/30/2017    BILITOT 0 5 11/30/2017    BILITOT 0 5 11/30/2017    BILITOT 0 5 11/30/2017       No results found for: IRON, TIBC, FERRITIN    Lab Results   Component Value Date    [id number] 422 08/08/2017         ROS: Review of Systems   Constitutional: Negative  Negative for appetite change, chills, diaphoresis, fatigue, fever and unexpected weight change  HENT:   Negative for hearing loss, lump/mass, mouth sores, nosebleeds, sore throat, trouble swallowing and voice change  Eyes: Negative  Negative for eye problems and icterus  Respiratory: Negative  Negative for chest tightness, cough, hemoptysis and shortness of breath  Cardiovascular: Negative for chest pain and leg swelling  Gastrointestinal: Negative for abdominal distention, abdominal pain, blood in stool, constipation, diarrhea and nausea  Endocrine: Negative  Genitourinary: Negative for dysuria, frequency, hematuria and pelvic pain  Musculoskeletal: Negative  Negative for arthralgias, back pain, flank pain, gait problem, myalgias and neck stiffness  Skin: Negative for itching and rash  Neurological: Negative for dizziness, gait problem, headaches, light-headedness, numbness and speech difficulty  Hematological: Negative for adenopathy  Does not bruise/bleed easily  Psychiatric/Behavioral: Negative for confusion, decreased concentration, depression and sleep disturbance  The patient is not nervous/anxious  Current Medications: Reviewed  Allergies: Reviewed  PMH/FH/SH:  Reviewed      Physical Exam:    Body surface area is 2 07 meters squared      Wt Readings from Last 3 Encounters:   03/16/22 86 6 kg (191 lb)   01/06/22 86 5 kg (190 lb 12 8 oz)   10/21/21 88 kg (194 lb)        Temp Readings from Last 3 Encounters:   03/16/22 (!) 97 °F (36 1 °C)   06/23/21 98 1 °F (36 7 °C)   06/10/21 98 °F (36 7 °C) (Tympanic)        BP Readings from Last 3 Encounters:   03/16/22 152/90   01/06/22 118/70   10/21/21 132/74         Pulse Readings from Last 3 Encounters:   22 90   10/21/21 83   21 81        Physical Exam  Vitals reviewed  Constitutional:       General: He is not in acute distress  Appearance: He is well-developed  He is not diaphoretic  HENT:      Head: Normocephalic and atraumatic  Eyes:      Conjunctiva/sclera: Conjunctivae normal    Neck:      Trachea: No tracheal deviation  Cardiovascular:      Rate and Rhythm: Normal rate and regular rhythm  Heart sounds: No murmur heard  No friction rub  No gallop  Pulmonary:      Effort: Pulmonary effort is normal  No respiratory distress  Breath sounds: Normal breath sounds  No wheezing or rales  Chest:      Chest wall: No tenderness  Abdominal:      General: There is no distension  Palpations: Abdomen is soft  Tenderness: There is no abdominal tenderness  Musculoskeletal:      Cervical back: Normal range of motion and neck supple  Right lower leg: No edema  Left lower leg: No edema  Lymphadenopathy:      Cervical: No cervical adenopathy  Skin:     General: Skin is warm and dry  Coloration: Skin is not pale  Findings: No erythema  Neurological:      Mental Status: He is alert and oriented to person, place, and time  Psychiatric:         Behavior: Behavior normal          Thought Content: Thought content normal          Judgment: Judgment normal          ECO    Goals and Barriers:  Current Goal: Minimize effects of disease  Barriers: None  Patient's Capacity to Self Care:  Patient is able to self care      Code Status: [unfilled]

## 2022-03-23 ENCOUNTER — TELEPHONE (OUTPATIENT)
Dept: NEPHROLOGY | Facility: CLINIC | Age: 67
End: 2022-03-23

## 2022-03-23 NOTE — TELEPHONE ENCOUNTER
Appointment Confirmation   Person confirmed appointment with  If not patient, name of the person Voice mail   Date and time of appointment 3/24    8:00   Patient acknowledged and will be at appointment? no    Did you advise the patient that they will need a urine sample if they are a new patient?  N/A    Did you advise the patient to bring their current medications for verification? (including any OTC) Yes    Additional Information

## 2022-03-24 ENCOUNTER — OFFICE VISIT (OUTPATIENT)
Dept: NEPHROLOGY | Facility: CLINIC | Age: 67
End: 2022-03-24
Payer: COMMERCIAL

## 2022-03-24 VITALS
HEIGHT: 71 IN | HEART RATE: 71 BPM | WEIGHT: 191 LBS | SYSTOLIC BLOOD PRESSURE: 148 MMHG | BODY MASS INDEX: 26.74 KG/M2 | OXYGEN SATURATION: 95 % | DIASTOLIC BLOOD PRESSURE: 82 MMHG

## 2022-03-24 DIAGNOSIS — R31.29 MICROSCOPIC HEMATURIA: ICD-10-CM

## 2022-03-24 DIAGNOSIS — I10 HYPERTENSION, UNSPECIFIED TYPE: ICD-10-CM

## 2022-03-24 DIAGNOSIS — D47.2 MGUS (MONOCLONAL GAMMOPATHY OF UNKNOWN SIGNIFICANCE): ICD-10-CM

## 2022-03-24 DIAGNOSIS — N18.2 STAGE 2 CHRONIC KIDNEY DISEASE: Primary | ICD-10-CM

## 2022-03-24 DIAGNOSIS — R80.9 PROTEINURIA, UNSPECIFIED TYPE: ICD-10-CM

## 2022-03-24 DIAGNOSIS — E83.52 HYPERCALCEMIA: ICD-10-CM

## 2022-03-24 PROCEDURE — 3008F BODY MASS INDEX DOCD: CPT | Performed by: PHYSICIAN ASSISTANT

## 2022-03-24 PROCEDURE — 1036F TOBACCO NON-USER: CPT | Performed by: PHYSICIAN ASSISTANT

## 2022-03-24 PROCEDURE — 3079F DIAST BP 80-89 MM HG: CPT | Performed by: PHYSICIAN ASSISTANT

## 2022-03-24 PROCEDURE — 99214 OFFICE O/P EST MOD 30 MIN: CPT | Performed by: PHYSICIAN ASSISTANT

## 2022-03-24 PROCEDURE — 3077F SYST BP >= 140 MM HG: CPT | Performed by: PHYSICIAN ASSISTANT

## 2022-03-24 NOTE — PROGRESS NOTES
OFFICE FOLLOW UP - Nephrology   Arianna Candelario 61 y o  male MRN: [id number]       ASSESSMENT/PLAN:  1  CKD stage 3:  Baseline creatinine around 1 5-1 7 with GFR in mid 50s  · Recent labs 03/07/2022: Creatinine 1 64 with GFR 50   2  Hypertension:  Blood pressure elevated today but typically is better at home  Goal blood pressure less than 130/80   · Monitor blood pressure at home for the next week and call in home readings  · Discussed proper way to take blood pressures  3  Proteinuria:  Worsening on recent labs up to 1 1 g  Possibly related to elevated blood pressure vs possible progression of MGUS to myeloma  Has upcoming bone marrow biopsy  Continue lisinopril 40 mg daily for now  4  Prostate cancer: Follows with Urology and currently on active surveillance  5  Hypercalcemia:  Improved down to 10 1  Possibly related to monoclonal gammopathy versus prostate cancer  · Vitamin-D 51, PTH 52, free light chain ratio elevated at 3 5, SPEP showed restricted band in the gammaglobulin region  6  IgG kappa MGUS: Follows with Hematology  Will be undergoing bone marrow biopsy in 1 month given worsening serum kappa free light chains      PATIENT INSTRUCTIONS:  Patient Instructions   Check blood pressure for 1 week at home and and then send in home readings   Repeat labs in 3 months and follow up in 3-4 months with Dr Barillas Marrow       HPI: Arianna Candelario is a 61 y o  male who is here for CKD follow-up  Feeling very well recently  No recent chest pain, shortness of breath, nausea, vomiting or diarrhea  No lower extremity edema  Does have an upcoming bone marrow biopsy with Hematology/Oncology in about a month  No hematuria or foaming of the urine  ROS:   A complete review of systems was done  Pertinent positives and negatives as noted in the HPI, otherwise the review of systems is negative      Allergies: Amlodipine, Penicillins, and Fenofibrate    Medications:   Current Outpatient Medications:     cholecalciferol (VITAMIN D3) 1,000 units tablet, Take 1 tablet (1,000 Units total) by mouth daily, Disp: , Rfl:     clobetasol (TEMOVATE) 0 05 % cream, Apply 1 application topically 2 (two) times a day, Disp: , Rfl:     Guselkumab 100 MG/ML SOSY, Inject per derm q 8 weeks, Disp: , Rfl:     lisinopril (ZESTRIL) 40 mg tablet, Take 1 tablet (40 mg total) by mouth daily, Disp: 90 tablet, Rfl: 1    lovastatin (MEVACOR) 40 MG tablet, TAKE 1 TABLET BY MOUTH EVERY DAY, Disp: 90 tablet, Rfl: 0    sildenafil (REVATIO) 20 mg tablet, Take 1-5 tablets by mouth on empty stomach one hour before sex, Disp: 30 tablet, Rfl: 0    Past Medical History:   Diagnosis Date    Adenocarcinoma of prostate ([facility] [address] )     Last assessed 08/08/17    Chronic kidney disease     Colon polyp     Psoriasis      Past Surgical History:   Procedure Laterality Date    NH COLONOSCOPY FLX DX W/COLLJ SPEC WHEN PFRMD N/A 8/31/2018    Procedure: COLONOSCOPY;  Surgeon: John Gates MD;  Location: [address] [place] [address] GI LAB; Service: Colorectal    PROSTATE BIOPSY  02/16/2018    TONSILLECTOMY       Family History   Problem Relation Age of Onset    Arthritis Mother     Hyperlipidemia Mother     Hypertension Mother     Diabetes Son       reports that he has never smoked  He has never used smokeless tobacco  He reports current alcohol use  He reports that he does not use drugs  Physical Exam:   Vitals:    03/24/22 0811 03/24/22 0822   BP: 150/90 148/82   BP Location: Left arm    Patient Position: Sitting    Cuff Size: Adult    Pulse: 71    SpO2: 95%    Weight: 86 6 kg (191 lb)    Height: 5' 11" (1 803 m)      Body mass index is 26 64 kg/m²      General: no acute distress   Eyes: conjunctivae pink, anicteric sclerae  ENT: mucous membranes moist  Neck: supple, no JVD  Chest: clear to auscultation bilaterally with no wheezes, rale or rhochi  CVS: regular rate and rhythm   Abdomen: soft, non-tender, non-distended  Extremities: no lower extremity edema   Skin: no rash  Neuro: awake and alert       Lab Results:  Results for orders placed or performed in visit on 03/07/22   Protein electrophoresis, serum   Result Value Ref Range    Protein, Total 7 0 6 1 - 8 1 g/dL    Albumin, Electrophoresis 4 1 3 8 - 4 8 g/dL    Alpha-1 Globulin 0 3 0 2 - 0 3 g/dL    Alpha-2 Globulin 0 6 0 5 - 0 9 g/dL    Beta 1 Globulin 0 4 0 4 - 0 6 g/dL    Beta 2 Globulin 0 3 0 2 - 0 5 g/dL    Gamma Globulin 1 3 0 8 - 1 7 g/dL    Abnormal Protein Band 1 0 5 (H) NONE DETECTED g/dL    Interpretation     Comprehensive metabolic panel   Result Value Ref Range    Glucose, Random 77 65 - 99 mg/dL    BUN 24 7 - 25 mg/dL    Creatinine 1 63 (H) 0 70 - 1 25 mg/dL    eGFR Non  44 (L) > OR = 60 mL/min/1 73m2    eGFR  51 (L) > OR = 60 mL/min/1 73m2    SL AMB BUN/CREATININE RATIO 15 6 - 22 (calc)    Sodium 138 135 - 146 mmol/L    Potassium 4 2 3 5 - 5 3 mmol/L    Chloride 104 98 - 110 mmol/L    CO2 27 20 - 32 mmol/L    Calcium 10 2 8 6 - 10 3 mg/dL    Protein, Total 7 0 6 1 - 8 1 g/dL    Albumin 4 0 3 6 - 5 1 g/dL    Globulin 3 0 1 9 - 3 7 g/dL (calc)    Albumin/Globulin Ratio 1 3 1 0 - 2 5 (calc)    TOTAL BILIRUBIN 0 6 0 2 - 1 2 mg/dL    Alkaline Phosphatase 52 35 - 144 U/L    AST 19 10 - 35 U/L    ALT 12 9 - 46 U/L   CBC and differential   Result Value Ref Range    White Blood Cell Count 7 1 3 8 - 10 8 Thousand/uL    Red Blood Cell Count 5 81 (H) 4 20 - 5 80 Million/uL    Hemoglobin 15 1 13 2 - 17 1 g/dL    HCT 46 5 38 5 - 50 0 %    MCV 80 0 80 0 - 100 0 fL    MCH 26 0 (L) 27 0 - 33 0 pg    MCHC 32 5 32 0 - 36 0 g/dL    RDW 14 2 11 0 - 15 0 %    Platelet Count 016 186 - 400 Thousand/uL    SL AMB MPV 9 9 7 5 - 12 5 fL    Neutrophils (Absolute) 4,132 1,500 - 7,800 cells/uL    Lymphocytes (Absolute) 1,669 850 - 3,900 cells/uL    Monocytes (Absolute) 930 200 - 950 cells/uL    Eosinophils (Absolute) 341 15 - 500 cells/uL    Basophils ABS 28 0 - 200 cells/uL    Neutrophils 58 2 %    Lymphocytes 23 5 %    Monocytes 13 1 %    Eosinophils 4 8 %    Basophils PCT 0 4 %   Immunoglobulins   Result Value Ref Range    IgA 188 70 - 320 mg/dL    IgG 1,391 600 - 1,540 mg/dL    IGM 58 50 - 300 mg/dL   Kappa/Lambda Light Chains Free With Ratio, Serum   Result Value Ref Range    Ig Kappa Free Light Chain 65 1 (H) 3 3 - 19 4 mg/L    Ig Lambda Free Light Chain 18 2 5 7 - 26 3 mg/L    Kappa/Lambda FluidC Ratio 3 58 (H) 0 26 - 1 65             Invalid input(s): ALBUMIN      Portions of the record may have been created with voice recognition software  Occasional wrong word or "sound a like" substitutions may have occurred due to the inherent limitations of voice recognition software  Read the chart carefully and recognize, using context, where substitutions have occurred  If you have any questions, please contact the dictating provider

## 2022-03-24 NOTE — PATIENT INSTRUCTIONS
Check blood pressure for 1 week at home and and then send in home readings   Repeat labs in 3 months and follow up in 3-4 months with Dr Alejandra Ware

## 2022-04-04 ENCOUNTER — APPOINTMENT (OUTPATIENT)
Dept: LAB | Facility: CLINIC | Age: 67
End: 2022-04-04
Payer: COMMERCIAL

## 2022-04-04 DIAGNOSIS — C61 PROSTATE CANCER (HCC): ICD-10-CM

## 2022-04-04 LAB — PSA SERPL-MCNC: 4.2 NG/ML (ref 0–4)

## 2022-04-04 PROCEDURE — 84153 ASSAY OF PSA TOTAL: CPT

## 2022-04-05 ENCOUNTER — HOSPITAL ENCOUNTER (OUTPATIENT)
Dept: RADIOLOGY | Age: 67
Discharge: HOME | End: 2022-04-05
Payer: COMMERCIAL

## 2022-04-05 DIAGNOSIS — C61 PROSTATE CANCER (HCC): ICD-10-CM

## 2022-04-05 PROCEDURE — G1004 CDSM NDSC: HCPCS

## 2022-04-05 PROCEDURE — 76377 3D RENDER W/INTRP POSTPROCES: CPT

## 2022-04-05 PROCEDURE — 72197 MRI PELVIS W/O & W/DYE: CPT

## 2022-04-05 PROCEDURE — A9585 GADOBUTROL INJECTION: HCPCS | Performed by: PHYSICIAN ASSISTANT

## 2022-04-05 RX ADMIN — GADOBUTROL 8 ML: 604.72 INJECTION INTRAVENOUS at 10:03

## 2022-04-07 ENCOUNTER — TELEPHONE (OUTPATIENT)
Dept: HEMATOLOGY ONCOLOGY | Facility: CLINIC | Age: 67
End: 2022-04-07

## 2022-04-08 ENCOUNTER — TELEPHONE (OUTPATIENT)
Dept: HEMATOLOGY ONCOLOGY | Facility: CLINIC | Age: 67
End: 2022-04-08

## 2022-04-08 NOTE — TELEPHONE ENCOUNTER
Per [employer] pa tool no pa is reqd for --    BONE MARROW BIOPSY CODES  [id number]   LEUKEMIA/LYMPHOME BY FLOW, MDS PANEL, B-CELL, CHROMOSOME ANALYSIS, CHROMOSOME FISH/CLL PANEL PROCEDURE CODE:  70373, [id number]       email sent to clinical team with this information

## 2022-04-08 NOTE — TELEPHONE ENCOUNTER
Resent email to [employer] if prior [employer] is needed for BMBX on Tuesday 4/12

## 2022-04-12 ENCOUNTER — OFFICE VISIT (OUTPATIENT)
Dept: HEMATOLOGY ONCOLOGY | Facility: CLINIC | Age: 67
End: 2022-04-12
Payer: COMMERCIAL

## 2022-04-12 ENCOUNTER — APPOINTMENT (OUTPATIENT)
Dept: LAB | Facility: HOSPITAL | Age: 67
End: 2022-04-12
Payer: COMMERCIAL

## 2022-04-12 VITALS
BODY MASS INDEX: 26.88 KG/M2 | HEIGHT: 71 IN | HEART RATE: 82 BPM | TEMPERATURE: 98.6 F | RESPIRATION RATE: 17 BRPM | SYSTOLIC BLOOD PRESSURE: 140 MMHG | WEIGHT: 192 LBS | DIASTOLIC BLOOD PRESSURE: 80 MMHG | OXYGEN SATURATION: 96 %

## 2022-04-12 DIAGNOSIS — D47.2 MGUS (MONOCLONAL GAMMOPATHY OF UNKNOWN SIGNIFICANCE): Primary | ICD-10-CM

## 2022-04-12 PROCEDURE — 88311 DECALCIFY TISSUE: CPT | Performed by: PATHOLOGY

## 2022-04-12 PROCEDURE — 88342 IMHCHEM/IMCYTCHM 1ST ANTB: CPT | Performed by: PATHOLOGY

## 2022-04-12 PROCEDURE — 88367 INSITU HYBRIDIZATION AUTO: CPT | Performed by: INTERNAL MEDICINE

## 2022-04-12 PROCEDURE — 88305 TISSUE EXAM BY PATHOLOGIST: CPT | Performed by: PATHOLOGY

## 2022-04-12 PROCEDURE — 88364 INSITU HYBRIDIZATION (FISH): CPT | Performed by: PATHOLOGY

## 2022-04-12 PROCEDURE — 3077F SYST BP >= 140 MM HG: CPT | Performed by: INTERNAL MEDICINE

## 2022-04-12 PROCEDURE — 99212 OFFICE O/P EST SF 10 MIN: CPT | Performed by: INTERNAL MEDICINE

## 2022-04-12 PROCEDURE — 88341 IMHCHEM/IMCYTCHM EA ADD ANTB: CPT | Performed by: PATHOLOGY

## 2022-04-12 PROCEDURE — 88365 INSITU HYBRIDIZATION (FISH): CPT | Performed by: PATHOLOGY

## 2022-04-12 PROCEDURE — 88360 TUMOR IMMUNOHISTOCHEM/MANUAL: CPT | Performed by: PATHOLOGY

## 2022-04-12 PROCEDURE — 85097 BONE MARROW INTERPRETATION: CPT | Performed by: PATHOLOGY

## 2022-04-12 PROCEDURE — 3079F DIAST BP 80-89 MM HG: CPT | Performed by: INTERNAL MEDICINE

## 2022-04-12 PROCEDURE — 88373 M/PHMTRC ALYS ISHQUANT/SEMIQ: CPT | Performed by: INTERNAL MEDICINE

## 2022-04-12 PROCEDURE — 88185 FLOWCYTOMETRY/TC ADD-ON: CPT

## 2022-04-12 PROCEDURE — 88374 M/PHMTRC ALYS ISHQUANT/SEMIQ: CPT | Performed by: INTERNAL MEDICINE

## 2022-04-12 PROCEDURE — 88237 TISSUE CULTURE BONE MARROW: CPT | Performed by: INTERNAL MEDICINE

## 2022-04-12 PROCEDURE — 88313 SPECIAL STAINS GROUP 2: CPT | Performed by: PATHOLOGY

## 2022-04-12 PROCEDURE — 81450 HL NEO GSAP 5-50DNA/DNA&RNA: CPT | Performed by: INTERNAL MEDICINE

## 2022-04-12 PROCEDURE — 88262 CHROMOSOME ANALYSIS 15-20: CPT | Performed by: INTERNAL MEDICINE

## 2022-04-12 PROCEDURE — 88184 FLOWCYTOMETRY/ TC 1 MARKER: CPT | Performed by: INTERNAL MEDICINE

## 2022-04-12 PROCEDURE — 38222 DX BONE MARROW BX & ASPIR: CPT | Performed by: INTERNAL MEDICINE

## 2022-04-12 NOTE — PROGRESS NOTES
Hematology Outpatient Follow - Up Note  Cisco Navarrete 61 y o  male MRN: @ Encounter: [id number]        Date:  4/12/2022        Assessment/ Plan:      I explained to the patient the need for bone marrow biopsy and aspirate, and the procedure itself, he agreed he signed the consent and will proceed      Labs and imaging studies are reviewed by ordering provider once results are available  If there are findings that need immediate attention, you will be contacted when results available  Discussing results and the implication on your healthcare is best discussed in person at your follow-up visit  HPI:    MGUS  Interval History:        Previous Treatment:         Test Results:    Imaging: MRI prostate multiparametric wo w contrast    Result Date: 4/8/2022  Narrative: MULTIPARAMETRIC MRI OF THE PROSTATE WITH AND WITHOUT CONTRAST-WITH 3-D POSTPROCESSING  INDICATION:   C61: Malignant neoplasm of prostate  COMPARISON: MRI of the prostate on 3/13/2019  PSA LEVEL: 4 2 ng/ml  4/4/2022  PRIOR BIOPSY DATE: 2/20/2018  BIOPSY RESULTS: A  Prostate, RPZ needle biopsy:       - Prostatic adenocarcinoma  See note    -Odessa score: 3+3 = 6    -Tumor extent: tumor involves two of four submitted cores    -Tumor volume: tumor represents approximately 30% and 5% of  each core    -Perineural invasion: not identified  B  Prostate, RCZ needle biopsy:       - Prostatic adenocarcinoma  See note    -Odessa score:  3+3 = 6    -Tumor extent: tumor involves one of two submitted cores    -Tumor volume: tumor represents approximately 20% of biopsy volume    -Perineural invasion:  not identified  TECHNIQUE: The following pulse sequences were obtained:  Small field-of-view axial T1-weighted and multiplanar T2-weighted images; DWI axial and ADC map; large field of view axial T2 weighted images; T1w in-phase and opposed-phase axials of entire pelvis  and dynamic 3D T1w axial before and during IV contrast injection   Imaging performed on 3 0T MRI CONTRAST:  Gadobutrol (Gadavist) 8 mL of Gadobutrol injection (SINGLE-DOSE) TECHNICAL LIMITATIONS: None  FINDINGS: PROSTATE:   Size: 6 0 x 5 8 x 6 2 cm = 111 cc  Post-biopsy hemorrhage:  None  Central gland enlargement (BPH): Marked  Focal lesions - No dominant lesion  Findings of BPH with a mostly encapsulated OR a homogenous circumscribed nodule without encapsulation OR a homogenous hypointense area between nodules  PI-RADSv2 1 Category 2 - Low (clinically significant cancer unlikely)  SEMINAL VESICLES: Unremarkable Note: Clinically significant cancer is defined on pathology/histology as Rayle score greater than or equal to 7, and/or volume of greater than or equal to 0 5 mL, and/or extraprostatic extension  URINARY BLADDER: Unremarkable  LYMPH NODES: 1 6 x 3 5 cm right external iliac lymph node, previously measured 1 1 x 2 0 cm  1 2 x 2 4 cm right inguinal lymph node, previously measured 0 7 x 1 0 cm  1 2 x 1 7 cm left external iliac lymph node, previously measured 1 2 x 1 5 cm  BONES: No suspicious osseous lesion  Impression: 1  PI-RADSv2 1 Category 2 - Low (clinically significant cancer is unlikely to be present)  2  Indeterminate right external iliac and inguinal lymph nodes, significantly increased in size since the prior study  3  No seminal vesicle invasion or pelvic osseous metastatic disease  4  Calculated prostate volume of 111 cc  Prostate gland boundaries were segmented using 3D advanced post-processing on an independent [employer] system workstation with active physician participation    Workstation performed: [id number]       Labs:   Lab Results   Component Value Date    WBC 7 1 03/07/2022    HGB 15 1 03/07/2022    HCT 46 5 03/07/2022    MCV 80 0 03/07/2022     03/07/2022     Lab Results   Component Value Date     11/30/2017     11/30/2017     11/30/2017     11/30/2017    K 4 2 03/07/2022     03/07/2022    CO2 27 03/07/2022    BUN 23 03/07/2022 CREATININE 1 64 (H) 2022    CALCIUM 10 1 2022    CALCIUM 10 1 2022    CORRECTEDCA 10 5 (H) 2018    AST 19 2022    ALT 12 2022    ALKPHOS 52 2022    PROT 7 1 2017    PROT 7 1 2017    PROT 7 1 2017    PROT 7 1 2017    BILITOT 0 5 2017    BILITOT 0 5 2017    BILITOT 0 5 2017    BILITOT 0 5 2017       No results found for: IRON, TIBC, FERRITIN    Lab Results   Component Value Date    PSCDBDKG49 559 2017         ROS: Review of Systems - Oncology       Current Medications: Reviewed  Allergies: Reviewed  PMH/FH/SH:  Reviewed      Physical Exam:    Body surface area is 2 07 meters squared  Wt Readings from Last 3 Encounters:   22 87 1 kg (192 lb)   22 86 6 kg (191 lb)   22 86 6 kg (191 lb)        Temp Readings from Last 3 Encounters:   22 98 6 °F (37 °C)   22 (!) 97 °F (36 1 °C)   21 98 1 °F (36 7 °C)        BP Readings from Last 3 Encounters:   22 140/80   22 148/82   22 152/90         Pulse Readings from Last 3 Encounters:   22 82   22 71   22 90        Physical Exam  Constitutional:       Appearance: Normal appearance  Neurological:      Mental Status: He is alert  Psychiatric:         Mood and Affect: Mood normal          Behavior: Behavior normal          Thought Content: Thought content normal          Judgment: Judgment normal          ECO    Goals and Barriers:  Current Goal: Minimize effects of disease  Barriers: None  Patient's Capacity to Self Care:  Patient is able to self care      Code Status: [unfilled]

## 2022-04-12 NOTE — PROGRESS NOTES
Bone marrow biopsy and aspirate procedure, consent obtained    Right superior posterior iliac crest prepared in normal fashion, 1% lidocaine used for local anesthesia, we proceeded with [employer] bone marrow biopsy needle and obtained a good size biopsy and aspirate, the patient tolerated procedure very well, no immediate complications, we sent for pathology and flow cytometry

## 2022-04-13 ENCOUNTER — TELEPHONE (OUTPATIENT)
Dept: HEMATOLOGY ONCOLOGY | Facility: CLINIC | Age: 67
End: 2022-04-13

## 2022-04-13 LAB — SCAN RESULT: NORMAL

## 2022-04-13 NOTE — TELEPHONE ENCOUNTER
Maria Guadaulpe from [facility] called in stating she will be faxing over abnormal results to Dr Vanessa [place] office   Susie Cooks can be reached back at [phone] option 1 for any questions

## 2022-04-14 ENCOUNTER — OFFICE VISIT (OUTPATIENT)
Dept: UROLOGY | Facility: CLINIC | Age: 67
End: 2022-04-14
Payer: COMMERCIAL

## 2022-04-14 VITALS
SYSTOLIC BLOOD PRESSURE: 130 MMHG | BODY MASS INDEX: 26.88 KG/M2 | DIASTOLIC BLOOD PRESSURE: 70 MMHG | HEART RATE: 76 BPM | HEIGHT: 71 IN | WEIGHT: 192 LBS

## 2022-04-14 DIAGNOSIS — N52.9 ERECTILE DYSFUNCTION, UNSPECIFIED ERECTILE DYSFUNCTION TYPE: ICD-10-CM

## 2022-04-14 DIAGNOSIS — C61 ADENOCARCINOMA OF PROSTATE (HCC): Primary | ICD-10-CM

## 2022-04-14 PROCEDURE — 3008F BODY MASS INDEX DOCD: CPT | Performed by: PHYSICIAN ASSISTANT

## 2022-04-14 PROCEDURE — 99214 OFFICE O/P EST MOD 30 MIN: CPT | Performed by: PHYSICIAN ASSISTANT

## 2022-04-14 PROCEDURE — 1036F TOBACCO NON-USER: CPT | Performed by: PHYSICIAN ASSISTANT

## 2022-04-14 RX ORDER — SILDENAFIL CITRATE 20 MG/1
TABLET ORAL
Qty: 30 TABLET | Refills: 3 | Status: SHIPPED | OUTPATIENT
Start: 2022-04-14

## 2022-04-14 NOTE — PROGRESS NOTES
4/14/2022      Chief Complaint   Patient presents with    Follow-up    Prostate Cancer         Assessment and Plan    61 y o  male managed by Dr Mackenzie Caceres    1  Small volume Fieldon 3+3=6 prostate cancer    Diagnosis 2012, last confirmatory biopsy 2018, MRI 2019  Presents today with updated MRI showing a massive prostate 111 cc, no focal lesion, PI-RADS two score low risk  There is an enlarging right external iliac lymph node compared to prior 1 6 x 2 0 now measuring 1 6 x 3 5 cm with other smaller right inguinal and left external iliac nodes around 1cm  Will review the images with Dr Viki Heath, likely suggest a follow-up CT pelvis with and without contrast vs PET ct scan to further evaluate the pelvic lymphadenopathy  Targeted tissue sampling may or may not be indicated  Will call the patient with the follow-up plan within the next week  Next PSA six months with JOSEPH  History of Present Illness  Molly Ozuna is a 61 y o  male here for evaluation of six-month follow-up prostate cancer  Patient was initially diagnosed with small volume Fieldon 3+3=6 prostate cancer in 2012  He has been on active surveillance protocol since that time nearing one decade  His most recent biopsy was 2018 consistent with low volume disease  He had a multiparametric MRI in 2019 without significant findings  PSA has been stable at 4 2 several years  Presents today with updated lab work  Using sildenafil 20-60 mg p r n  For erections, refill sent today  No new complaints  Voiding well  No hematuria retention issues  No family history of prostate cancer  Up-to-date with colonoscopy with next planned in one month for 5 year surveillance  He is followed by Dr Donte Cevallos of medical oncology for monoclonal gammopathy underwent a bone marrow biopsy and aspirate last week  Review of Systems   Constitutional: Negative for activity change, appetite change, chills, fever and unexpected weight change  HENT: Negative  Respiratory: Negative  Negative for shortness of breath  Cardiovascular: Negative  Negative for chest pain  Gastrointestinal: Negative for abdominal pain, diarrhea, nausea and vomiting  Endocrine: Negative  Genitourinary: Negative for decreased urine volume, difficulty urinating, dysuria, flank pain, frequency, hematuria, testicular pain and urgency  Musculoskeletal: Negative for back pain and gait problem  Skin: Negative  Allergic/Immunologic: Negative  Neurological: Negative  Hematological: Negative for adenopathy  Does not bruise/bleed easily  AUA SYMPTOM SCORE      Most Recent Value   AUA SYMPTOM SCORE    How often have you had a sensation of not emptying your bladder completely after you finished urinating? 1   How often have you had to urinate again less than two hours after you finished urinating? 2   How often have you found you stopped and started again several times when you urinate? 1   How often have you found it difficult to postpone urination? 0   How often have you had a weak urinary stream? 0   How often have you had to push or strain to begin urination? 0   How many times did you most typically get up to urinate from the time you went to bed at night until the time you got up in the morning? 3   Quality of Life: If you were to spend the rest of your life with your urinary condition just the way it is now, how would you feel about that? 2   AUA SYMPTOM SCORE 7           Vitals  Vitals:    04/14/22 0811   BP: 130/70   BP Location: Left arm   Patient Position: Sitting   Cuff Size: Adult   Pulse: 76   Weight: 87 1 kg (192 lb)   Height: 5' 11" (1 803 m)       Physical Exam  Vitals and nursing note reviewed  Constitutional:       General: He is not in acute distress  Appearance: He is well-developed  He is not diaphoretic  HENT:      Head: Normocephalic and atraumatic     Pulmonary:      Effort: Pulmonary effort is normal    Abdominal:      General: Abdomen is flat       Tenderness: There is no abdominal tenderness  Skin:     General: Skin is warm and dry  Neurological:      Mental Status: He is alert and oriented to person, place, and time        Gait: Gait normal    Psychiatric:         Speech: Speech normal          Behavior: Behavior normal            Past History  Past Medical History:   Diagnosis Date    Ad[facility] prostate ([facility] [address] )     Last assessed 08/08/17    Chronic kidney disease     Colon polyp     Psoriasis      Social History     Socioeconomic History    Marital status: /Civil Union     Spouse name: None    Number of children: None    Years of education: None    Highest education level: None   Occupational History    Occupation:  retired IT and finance   Tobacco Use    Smoking status: Never Smoker    Smokeless tobacco: Never Used   Vaping Use    Vaping Use: Never used   Substance and Sexual Activity    Alcohol use: Yes     Comment: very rarely    Drug use: No    Sexual activity: None   Other Topics Concern    None   Social History Narrative    None     Social Determinants of Health     Financial Resource Strain: Not on file   Food Insecurity: Not on file   Transportation Needs: Not on file   Physical Activity: Not on file   Stress: Not on file   Social Connections: Not on file   Intimate Partner Violence: Not on file   Housing Stability: Not on file     Social History     Tobacco Use   Smoking Status Never Smoker   Smokeless Tobacco Never Used     Family History   Problem Relation Age of Onset    Arthritis Mother     Hyperlipidemia Mother     Hypertension Mother     Diabetes Son        The following portions of the patient's history were reviewed and updated as appropriate: allergies, current medications, past medical history, past social history, past surgical history and problem list     Results  No results found for this or any previous visit (from the past 1 hour(s)) ]  Lab Results   Component Value Date    PSA 4 2 (H) 04/04/2022    PSA 4 20 (H) 10/11/2021    PSA 4 4 (H) 04/05/2021    PSA 3 2 10/05/2020     Lab Results   Component Value Date    CALCIUM 10 1 03/07/2022    CALCIUM 10 1 03/07/2022     11/30/2017     11/30/2017     11/30/2017     11/30/2017    K 4 2 03/07/2022    CO2 27 03/07/2022     03/07/2022    BUN 23 03/07/2022    CREATININE 1 64 (H) 03/07/2022     Lab Results   Component Value Date    WBC 7 1 03/07/2022    HGB 15 1 03/07/2022    HCT 46 5 03/07/2022    MCV 80 0 03/07/2022     03/07/2022

## 2022-04-18 LAB — SCAN RESULT: NORMAL

## 2022-04-19 LAB
MISCELLANEOUS LAB TEST RESULT: NORMAL
SCAN RESULT: NORMAL

## 2022-05-04 ENCOUNTER — OFFICE VISIT (OUTPATIENT)
Dept: HEMATOLOGY ONCOLOGY | Facility: CLINIC | Age: 67
End: 2022-05-04
Payer: COMMERCIAL

## 2022-05-04 VITALS
TEMPERATURE: 97.3 F | WEIGHT: 193 LBS | OXYGEN SATURATION: 98 % | DIASTOLIC BLOOD PRESSURE: 82 MMHG | HEART RATE: 88 BPM | SYSTOLIC BLOOD PRESSURE: 142 MMHG | BODY MASS INDEX: 27.02 KG/M2 | RESPIRATION RATE: 17 BRPM | HEIGHT: 71 IN

## 2022-05-04 DIAGNOSIS — D47.2 SMOLDERING MULTIPLE MYELOMA: Primary | ICD-10-CM

## 2022-05-04 DIAGNOSIS — C61 ADENOCARCINOMA OF PROSTATE (HCC): ICD-10-CM

## 2022-05-04 PROCEDURE — 99214 OFFICE O/P EST MOD 30 MIN: CPT | Performed by: INTERNAL MEDICINE

## 2022-05-04 NOTE — PROGRESS NOTES
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 with M protein of 0 3 gram/deciliter, kappa light chain 65, lambda light chain 18 with a ratio of 3 58, creatinine 1 64, normal albumin, total protein, calcium, await for PET scan, this is most likely smoldering multiple myeloma, we talked about progression into edilson multiple myeloma 10% year however the patient has psoriatic arthritis and this might be the reason for elevation of kappa light chain and stimulation of the bone marrow plasma cells     If the PET scan is normal we will watch and observe every 3 months with free light chain, CBC, CMP, quantitative immunoglobulins and SPEP     2  History of prostate cancer [place] score 6 on active surveillance by Urology    3  Chronic kidney disease  4  Elevated hemoglobin however workup negative for JAK2 mutation, MPL 1 mutation, CALR mutation and hemoglobin electrophoresis was reported normal        Labs and imaging studies are reviewed by ordering provider once results are available  If there are findings that need immediate attention, you will be contacted when results available  Discussing results and the implication on your healthcare is best discussed in person at your follow-up visit  HPI:    Cherelle Machado a 61 y o  seen for initial consultation 12/5/2019 at the referral of Jessi Enciso DO regarding MGUS and elevated H/H        10/21/2019:  SPEP with immunofixation identified IgG kappa monoclonal band measuring 0 3 gram/deciliter     9/4/2019:  Normal quantitative immunoglobulins   Creatinine 1 61 otherwise normal CMP    Creatinine has been steadily been increasing slowly since 2016  Zonia España follows with Dr Ramirez Glover regarding his CKD     Hemoglobin 16 3, hematocrit 51 2, white blood cell count 8 2 with normal differential, platelet count 191     He feels well   Denies any fevers, chills, sweats  Earlene So has been stable     He has been retired since 64years old  Nenita Cook frequently with his wife who is also retired      Does not smoke or drink      He has psoriasis followed by Dr Ybarra Place        He follows with Dr Yovany Ayon volume Deanna 6 prostate cancer diagnosed on an initial prostate biopsy in 2017  In 2018,a surveillance biopsy again showed 3 of 12 cores with [place] 6 prostate cancer   He is on active surveillance     Workup for MGUS 12/2019 showed normal quantitative immunoglobulins, kappa light chain 14 7, lambda light chain 11 1, erythropoietin 5 4, negative for JAK2 mutation profile as well as MPL 1, CALR for polycythemia, hemoglobin electrophoresis normal       Component      Latest Ref Rng & Units 11/20/2017 12/16/2019   IMMUNOGLOBULIN KAPPA FREE LIGHT CHAIN      3 3 - 19 4 mg/L 23 8 (H) 14 7   IMMUNOGLOBULIN LAMBDA FREE LIGHT CHAIN      5 7 - 26 3 mg/L 18 8 11 1   KAPPA/LAMBDA FREE LIGHT CHAIN RATIO      0 26 - 1 65 1 27 1 32      Component      Latest Ref Rng & Units 7/13/2020 6/3/2021 3/7/2022   IMMUNOGLOBULIN KAPPA FREE LIGHT CHAIN      3 3 - 19 4 mg/L 29 6 (H) 41 5 (H) 65 1 (H)   IMMUNOGLOBULIN LAMBDA FREE LIGHT CHAIN      5 7 - 26 3 mg/L 16 5 21 8 18 2   KAPPA/LAMBDA FREE LIGHT CHAIN RATIO      0 26 - 1 65 1 79 (H) 1 90 (H) 3 58 (H)         Bone marrow biopsy on April 2022 showed 12-15% plasma cells, normal cytogenetics, fish panel for multiple myeloma is normal, molecular test however showed NF1 variant of unknown clinical significance  Interval History:        Previous Treatment:         Test Results:    Imaging: MRI prostate multiparametric wo w contrast    Result Date: 4/8/2022  Narrative: MULTIPARAMETRIC MRI OF THE PROSTATE WITH AND WITHOUT CONTRAST-WITH 3-D POSTPROCESSING  INDICATION:   C61: Malignant neoplasm of prostate  COMPARISON: MRI of the prostate on 3/13/2019  PSA LEVEL: 4 2 ng/ml  4/4/2022   PRIOR BIOPSY DATE: 2/20/2018  BIOPSY RESULTS: A  Prostate, RPZ needle biopsy:       - Prostatic adenocarcinoma  See note    -Deanna score: 3+3 = 6    -Tumor extent: tumor involves two of four submitted cores    -Tumor volume: tumor represents approximately 30% and 5% of  each core    -Perineural invasion: not identified  B  Prostate, RCZ needle biopsy:       - Prostatic adenocarcinoma  See note    -Deanna score:  3+3 = 6    -Tumor extent: tumor involves one of two submitted cores    -Tumor volume: tumor represents approximately 20% of biopsy volume    -Perineural invasion:  not identified  TECHNIQUE: The following pulse sequences were obtained:  Small field-of-view axial T1-weighted and multiplanar T2-weighted images; DWI axial and ADC map; large field of view axial T2 weighted images; T1w in-phase and opposed-phase axials of entire pelvis  and dynamic 3D T1w axial before and during IV contrast injection  Imaging performed on 3 0T MRI CONTRAST:  Gadobutrol (Gadavist) 8 mL of Gadobutrol injection (SINGLE-DOSE) TECHNICAL LIMITATIONS: None  FINDINGS: PROSTATE:   Size: 6 0 x 5 8 x 6 2 cm = 111 cc  Post-biopsy hemorrhage:  None  Central gland enlargement (BPH): Marked  Focal lesions - No dominant lesion  Findings of BPH with a mostly encapsulated OR a homogenous circumscribed nodule without encapsulation OR a homogenous hypointense area between nodules  PI-RADSv2 1 Category 2 - Low (clinically significant cancer unlikely)  SEMINAL VESICLES: Unremarkable Note: Clinically significant cancer is defined on pathology/histology as [place] score greater than or equal to 7, and/or volume of greater than or equal to 0 5 mL, and/or extraprostatic extension  URINARY BLADDER: Unremarkable  LYMPH NODES: 1 6 x 3 5 cm right external iliac lymph node, previously measured 1 1 x 2 0 cm  1 2 x 2 4 cm right inguinal lymph node, previously measured 0 7 x 1 0 cm    1 2 x 1 7 cm left external iliac lymph node, previously measured 1 2 x 1 5 cm  BONES: No suspicious osseous lesion  Impression: 1  PI-RADSv2 1 Category 2 - Low (clinically significant cancer is unlikely to be present)  2  Indeterminate right external iliac and inguinal lymph nodes, significantly increased in size since the prior study  3  No seminal vesicle invasion or pelvic osseous metastatic disease  4  Calculated prostate volume of 111 cc  Prostate gland boundaries were segmented using 3D advanced post-processing on an independent [employer] system workstation with active physician participation  Workstation performed: [id number]       Labs:   Lab Results   Component Value Date    WBC 7 1 03/07/2022    HGB 15 1 03/07/2022    HCT 46 5 03/07/2022    MCV 80 0 03/07/2022     03/07/2022     Lab Results   Component Value Date     11/30/2017     11/30/2017     11/30/2017     11/30/2017    K 4 2 03/07/2022     03/07/2022    CO2 27 03/07/2022    BUN 23 03/07/2022    CREATININE 1 64 (H) 03/07/2022    CALCIUM 10 1 03/07/2022    CALCIUM 10 1 03/07/2022    CORRECTEDCA 10 5 (H) 02/05/2018    AST 19 03/07/2022    ALT 12 03/07/2022    ALKPHOS 52 03/07/2022    PROT 7 1 11/30/2017    PROT 7 1 11/30/2017    PROT 7 1 11/30/2017    PROT 7 1 11/30/2017    BILITOT 0 5 11/30/2017    BILITOT 0 5 11/30/2017    BILITOT 0 5 11/30/2017    BILITOT 0 5 11/30/2017       No results found for: IRON, TIBC, FERRITIN    Lab Results   Component Value Date    [id number] 422 08/08/2017         ROS: Review of Systems   Constitutional: Negative  Negative for appetite change, chills, diaphoresis, fatigue, fever and unexpected weight change  HENT:   Negative for hearing loss, lump/mass, mouth sores, nosebleeds, sore throat, trouble swallowing and voice change  Eyes: Negative  Negative for eye problems and icterus  Respiratory: Negative  Negative for chest tightness, cough, hemoptysis and shortness of breath  Cardiovascular: Negative for chest pain and leg swelling  Gastrointestinal: Negative for abdominal distention, abdominal pain, blood in stool, constipation, diarrhea and nausea  Endocrine: Negative  Genitourinary: Negative for dysuria, frequency, hematuria and pelvic pain  Musculoskeletal: Negative  Negative for arthralgias, back pain, flank pain, gait problem, myalgias and neck stiffness  Skin: Negative for itching and rash  Neurological: Negative for dizziness, gait problem, headaches, light-headedness, numbness and speech difficulty  Hematological: Negative for adenopathy  Does not bruise/bleed easily  Psychiatric/Behavioral: Negative for confusion, decreased concentration, depression and sleep disturbance  The patient is not nervous/anxious  Current Medications: Reviewed  Allergies: Reviewed  PMH/FH/SH:  Reviewed      Physical Exam:    Body surface area is 2 08 meters squared  Wt Readings from Last 3 Encounters:   22 87 5 kg (193 lb)   22 87 1 kg (192 lb)   22 87 1 kg (192 lb)        Temp Readings from Last 3 Encounters:   22 (!) 97 3 °F (36 3 °C)   22 98 6 °F (37 °C)   22 (!) 97 °F (36 1 °C)        BP Readings from Last 3 Encounters:   22 142/82   22 130/70   22 140/80         Pulse Readings from Last 3 Encounters:   22 88   22 76   22 82        Physical Exam  Constitutional:       Appearance: Normal appearance  He is normal weight  HENT:      Head: Normocephalic and atraumatic  Nose: Nose normal    Neurological:      General: No focal deficit present  Mental Status: He is alert and oriented to person, place, and time  Psychiatric:         Mood and Affect: Mood normal          Behavior: Behavior normal          Thought Content: Thought content normal          Judgment: Judgment normal          ECO    Goals and Barriers:  Current Goal: Minimize effects of disease  Barriers: None        Patient's Capacity to Self Care:  Patient is able to self care     Code Status: @[facility]@

## 2022-05-06 ENCOUNTER — HOSPITAL ENCOUNTER (OUTPATIENT)
Dept: RADIOLOGY | Age: 67
Discharge: HOME | End: 2022-05-06
Payer: COMMERCIAL

## 2022-05-06 DIAGNOSIS — R59.0 INGUINAL LYMPHADENOPATHY: ICD-10-CM

## 2022-05-06 DIAGNOSIS — C61 ADENOCARCINOMA OF PROSTATE (HCC): ICD-10-CM

## 2022-05-06 PROCEDURE — 78815 PET IMAGE W/CT SKULL-THIGH: CPT

## 2022-05-06 PROCEDURE — A9595 HB PIFLUFOLASTAT F-18, DIAGNOSTIC, 1 MILLICURIE: HCPCS

## 2022-05-06 PROCEDURE — G1004 CDSM NDSC: HCPCS

## 2022-05-10 ENCOUNTER — TELEPHONE (OUTPATIENT)
Dept: UROLOGY | Facility: CLINIC | Age: 67
End: 2022-05-10

## 2022-05-10 NOTE — TELEPHONE ENCOUNTER
----- Message from Karine Manzo MD sent at 5/9/2022  3:48 PM EDT -----  Appropriate for tumor board and a referral back to Marilyn Orozco  Thank you  FT   ----- Message -----  From: Federico Dalton PA-C  Sent: 5/9/2022  12:58 PM EDT  To: Karine Manzo MD    Small volume [place] 3+3=6 prostate cancer diagnosed one decade ago in 2012, has done very well on active surveillance with few confirmatory biopsies during that time  PSA is currently stable a while at 4 2, has a greater than 100 g gland  Have been monitoring with MRI  Bilateral iliac lymphadenopathy seemingly benign has enlarged some compared to years prior  He does have a history of myeloma and is established with Dr Bernadette Ram, had a recent bone marrow bx with them I believe last month as well  He has not previously had a tissue biopsy of these lymph node(s)  If you think it is appropriate for me to order an IR consult or perhaps we can discuss his case at the next tumor board?     Thanks  Mary Linder    ----- Message -----  From: Interface, Radiology Results In  Sent: 5/6/2022   2:54 PM EDT  To: Federico Dalton PA-C

## 2022-05-10 NOTE — TELEPHONE ENCOUNTER
Called and spoke with patient, reviewed pet scan results thus far and plan for multidisciplinary discussion  Will followup next week with homero  He agreed to plan

## 2022-05-11 ENCOUNTER — PATIENT OUTREACH (OUTPATIENT)
Dept: HEMATOLOGY ONCOLOGY | Facility: CLINIC | Age: 67
End: 2022-05-11

## 2022-05-11 NOTE — PROGRESS NOTES
Call to pt advising we would like to move up his [facility] visit based on PET scan that pt confirms Jeanie Martins PA-C, had discussed with him  Informed him we are reviewing his case on Tuesday, 5/27/22 and would like to discuss recommendations on 5/19/22  Unfortunately he is unavailable for that day  Provided appt for 6/28/22 but informed him we will be looking for a sooner appt with cancellations  He verbalized understanding

## 2022-05-12 ENCOUNTER — TELEPHONE (OUTPATIENT)
Dept: UROLOGY | Facility: AMBULATORY SURGERY CENTER | Age: 67
End: 2022-05-12

## 2022-05-12 NOTE — TELEPHONE ENCOUNTER
----- Message from Harriett Morgan MD sent at 5/11/2022 10:31 PM EDT -----  Briana Alamo will need to see me as well  He has been on AS for years with a stable PSA and low PIRADS MRI and G 6 disease  I would be surprised if this is mets, but needs investigation  Possibly a bx  Please put him in my schedule ASAP  Thank you and thanks Isaiah Cat  FT   ----- Message -----  From: Katrina Greco RN  Sent: 5/11/2022   2:26 PM EDT  To: Harriett Morgan MD    Scheduled for 6/28  with Dr Paula Padilla tentatively but looking for cancellations for sooner  ----- Message -----  From: Harriett Morgan MD  Sent: 5/9/2022   3:49 PM EDT  To: Rich Linder PA-C, Yordy Womack MD, #    Appropriate for tumor board and a referral back to Nila Frey  Thank you  FT   ----- Message -----  From: Rich Linder PA-C  Sent: 5/9/2022  12:58 PM EDT  To: Harriett Morgan MD    Small volume [place] 3+3=6 prostate cancer diagnosed one decade ago in 2012, has done very well on active surveillance with few confirmatory biopsies during that time  PSA is currently stable a while at 4 2, has a greater than 100 g gland  Have been monitoring with MRI  Bilateral iliac lymphadenopathy seemingly benign has enlarged some compared to years prior  He does have a history of myeloma and is established with Dr Paula Padilla, had a recent bone marrow bx with them I believe last month as well  He has not previously had a tissue biopsy of these lymph node(s)  If you think it is appropriate for me to order an IR consult or perhaps we can discuss his case at the next tumor board?     Thanks  Versa Sero    ----- Message -----  From: Interface, Radiology Results In  Sent: 5/6/2022   2:54 PM EDT  To: Rich Linder PA-C

## 2022-05-17 ENCOUNTER — DOCUMENTATION (OUTPATIENT)
Dept: HEMATOLOGY ONCOLOGY | Facility: CLINIC | Age: 67
End: 2022-05-17

## 2022-05-17 NOTE — PROGRESS NOTES
Oncology Navigator Note: Multidisciplinary Urology Case Review 5/17/2022    Diagnosis:  Percy Tenorio is a 60 yo male with a history of Smoldering Myeloma and Deanna 3+3=6 Prostate Cancer diagnosed in 2012  PSA stable at 4 2 and ongoing surveillance with MRI  Enlarging lymph nodes seen on most recent MRI  PSMA Pet showed variable mild radiotracer uptake in the previously noted prominent external iliac chain and right inguinal lymph nodes, nonspecific  Metastatic disease is not excluded  Recommendations of Group:  Referral to IR for biopsy of Lymph node         Future Appointments   Date Time Provider Hay Etienne   5/31/2022  8:00 AM Sujata Velazquez MD [facility]   6/28/2022  3:00 PM Abdirizak Álvarez MD [facility]   7/14/2022  8:00 AM Selina Reyna PA-C [facility]   10/14/2022  8:30 AM Mason Ruiz PA-C [facility]            Patient was discussed at the Multidisciplinary Urology Case review on 5/17/2022      NCCN guidelines were available for review  The final treatment plan will be left to the discretion of the patient and the treating physician  DISCLAIMERS:  TO THE TREATING PHYSICIAN:  This conference is a meeting of clinicians from various specialty areas who evaluate and discuss patients for whom a multidisciplinary treatment approach is being considered  Please note that the above opinion was a consensus of the conference attendees and is intended only to assist in quality care of the discussed patient  The responsibility for follow up on the input given during the conference, along with any final decisions regarding plan of care, is that of the patient and the patient's provider  Accordingly, appointments have only been recommended based on this information and have NOT been scheduled unless otherwise noted  TO THE PATIENT:  This summary is a brief record of major aspects of your cancer treatment   You may choose to share a copy with any of your doctors or nurses  However, this is not a detailed or comprehensive record of your care

## 2022-05-18 ENCOUNTER — PATIENT OUTREACH (OUTPATIENT)
Dept: HEMATOLOGY ONCOLOGY | Facility: CLINIC | Age: 67
End: 2022-05-18

## 2022-05-18 ENCOUNTER — PREP FOR PROCEDURE (OUTPATIENT)
Dept: INTERVENTIONAL RADIOLOGY/VASCULAR | Facility: CLINIC | Age: 67
End: 2022-05-18

## 2022-05-18 DIAGNOSIS — R59.0 PELVIC LYMPHADENOPATHY: ICD-10-CM

## 2022-05-18 DIAGNOSIS — D47.2 SMOLDERING MULTIPLE MYELOMA: ICD-10-CM

## 2022-05-18 DIAGNOSIS — C61 ADENOCARCINOMA OF PROSTATE (HCC): Primary | ICD-10-CM

## 2022-05-18 RX ORDER — SODIUM CHLORIDE 9 MG/ML
75 INJECTION, SOLUTION INTRAVENOUS CONTINUOUS
Status: CANCELLED | OUTPATIENT
Start: 2022-05-18

## 2022-05-18 NOTE — PROGRESS NOTES
Call to pt to provide update from yesterday's tumor bord discussion  Informed him that the group is recommending biopsy of the enlarging lymph nodes since they have grown and his history of prostate ca and Smoldering myeloma  He is agreeable  Informed him Dr Valerie Marques will reach out to him tomorrow to further discuss

## 2022-05-20 ENCOUNTER — TELEPHONE (OUTPATIENT)
Dept: UROLOGY | Facility: CLINIC | Age: 67
End: 2022-05-20

## 2022-05-20 NOTE — TELEPHONE ENCOUNTER
Called Angelina Palma to discuss  TB recommendations of IR CT guided needle biopsy for his enlarged LN  Prior h/o MGUS and G6 CaP managed with AS for 10 years  PSA stable at 4 and mpMRI with PIRADs 2   bx 6/13  Follow up with FT after the biopsy

## 2022-05-27 ENCOUNTER — TELEPHONE (OUTPATIENT)
Dept: NEPHROLOGY | Facility: CLINIC | Age: 67
End: 2022-05-27

## 2022-05-27 NOTE — TELEPHONE ENCOUNTER
Spoke with Patient and schedule appointment for 7/7 with David Tovar in the [facility]

## 2022-05-31 ENCOUNTER — OFFICE VISIT (OUTPATIENT)
Dept: UROLOGY | Facility: CLINIC | Age: 67
End: 2022-05-31
Payer: COMMERCIAL

## 2022-05-31 VITALS
HEIGHT: 71 IN | WEIGHT: 191 LBS | BODY MASS INDEX: 26.74 KG/M2 | DIASTOLIC BLOOD PRESSURE: 80 MMHG | SYSTOLIC BLOOD PRESSURE: 140 MMHG | HEART RATE: 71 BPM

## 2022-05-31 DIAGNOSIS — N40.0 BENIGN PROSTATIC HYPERPLASIA WITHOUT LOWER URINARY TRACT SYMPTOMS: Primary | ICD-10-CM

## 2022-05-31 LAB
SL AMB  POCT GLUCOSE, UA: ABNORMAL
SL AMB LEUKOCYTE ESTERASE,UA: ABNORMAL
SL AMB POCT BILIRUBIN,UA: ABNORMAL
SL AMB POCT BLOOD,UA: 2
SL AMB POCT CLARITY,UA: CLEAR
SL AMB POCT COLOR,UA: YELLOW
SL AMB POCT KETONES,UA: ABNORMAL
SL AMB POCT NITRITE,UA: ABNORMAL
SL AMB POCT PH,UA: 2
SL AMB POCT SPECIFIC GRAVITY,UA: 1.01
SL AMB POCT URINE PROTEIN: ABNORMAL
SL AMB POCT UROBILINOGEN: ABNORMAL

## 2022-05-31 PROCEDURE — 3008F BODY MASS INDEX DOCD: CPT | Performed by: UROLOGY

## 2022-05-31 PROCEDURE — 99215 OFFICE O/P EST HI 40 MIN: CPT | Performed by: UROLOGY

## 2022-05-31 PROCEDURE — 3077F SYST BP >= 140 MM HG: CPT | Performed by: UROLOGY

## 2022-05-31 PROCEDURE — 81002 URINALYSIS NONAUTO W/O SCOPE: CPT | Performed by: UROLOGY

## 2022-05-31 PROCEDURE — 1036F TOBACCO NON-USER: CPT | Performed by: UROLOGY

## 2022-05-31 PROCEDURE — 3079F DIAST BP 80-89 MM HG: CPT | Performed by: UROLOGY

## 2022-05-31 NOTE — H&P (VIEW-ONLY)
5/31/2022    Zelda Sakina  1955  [id number]        Assessment  Small volume Deanna 6 prostate cancer on active surveillance, 111 g prostate, right iliac and right inguinal lymphadenopathy, history multiple myeloma/MGUS      Discussion  I had a lengthy discussion with the patient his wife in the office today regarding his history of [place] 6 prostate cancer on active surveillance with stability for 10 years including a stable multiparametric MRI of the prostate  We focused our discussion time today on his iliac and inguinal lymphadenopathy  This is faintly positive on PSMA CT PET scan testing  He is scheduled to undergo CT-guided needle biopsy of the enlarged lymph nodes on June 13, 2022  He will return in follow-up after the biopsy results are known  If this is in fact prostate cancer the next step would be for androgen deprivation therapy with Lupron and likely oral ADT such as Zytiga or Xtandi  Further recommendations pending the results of his biopsy  History of Present Illness  61 y o  male with a history of small volume Deanna 6 prostate cancer identified on initial biopsy 10 years ago in 2012  His PSA since that time has remained stable between 3 and 5  He has had more than 1 multiparametric MRI of the prostate including his most recent multiparametric MRI which revealed a 111 g with PIRADS 2 scoring  Interestingly there was nonspecific enlarged right external iliac and inguinal lymph nodes  These had increased in size since his prior study  He has a history of smoldering multiple myeloma/MGUS  He follows with Dr Elyn Kanner  The results of the multiparametric MRI prompted a PSMA PET scan which revealed slight uptake within the prostate itself and mild uptake in the iliac lymph node chain  The largest iliac lymph node measures up to 3 0 x 1 7 cm  Right inguinal lymph nodes are also enlarged as well    Typically we discussed that prostate cancer does not travel in the inguinal lymph node chain and that having locally advanced/metastatic prostate cancer with a PSA of less than 4 in [place] 6 disease is low probability but potentially possible  His wife is present with him in the office today  I previously called the patient and arranged for a CT-guided needle biopsy to be performed in Interventional Radiology  This is scheduled for June 13, 2022  He has minimal lower urinary tract symptoms despite his enlarged prostate  He is not on alpha blockade or 5 alpha reductase inhibitors at this time  Most recent PSA 4 2  AUA Symptom Score      Review of Systems  Review of Systems   Constitutional: Negative  HENT: Negative  Eyes: Negative  Respiratory: Negative  Cardiovascular: Negative  Gastrointestinal: Negative  Endocrine: Negative  Genitourinary:        Per HPI   Musculoskeletal: Negative  Skin: Negative  Allergic/Immunologic: Negative  Neurological: Negative  Hematological: Negative  Psychiatric/Behavioral: Negative  Past Medical History  Past Medical History:   Diagnosis Date    Adenocarcinoma of prostate ([facility] [address] )     Last assessed 08/08/17    Chronic kidney disease     Colon polyp     Psoriasis        Past Social History  Past Surgical History:   Procedure Laterality Date    [place] COLONOSCOPY FLX DX W/COLLJ SPEC WHEN PFRMD N/A 8/31/2018    Procedure: COLONOSCOPY;  Surgeon: Damien Graves MD;  Location: [address] [place] [address] GI LAB;   Service: Colorectal    PROSTATE BIOPSY  02/16/2018    TONSILLECTOMY         Past Family History  Family History   Problem Relation Age of Onset    Arthritis Mother     Hyperlipidemia Mother     Hypertension Mother     Diabetes Son        Past Social history  Social History     Socioeconomic History    Marital status: /Civil Union     Spouse name: Not on file    Number of children: Not on file    Years of education: Not on file    Highest education level: Not on file   Occupational History    Occupation:  retired IT and finance   Tobacco Use    Smoking status: Never Smoker    Smokeless tobacco: Never Used   Vaping Use    Vaping Use: Never used   Substance and Sexual Activity    Alcohol use: Yes     Comment: very rarely    Drug use: No    Sexual activity: Not on file   Other Topics Concern    Not on file   Social History Narrative    Not on file     Social Determinants of Health     Financial Resource Strain: Not on file   Food Insecurity: Not on file   Transportation Needs: Not on file   Physical Activity: Not on file   Stress: Not on file   Social Connections: Not on file   Intimate Partner Violence: Not on file   Housing Stability: Not on file       Current Medications  Current Outpatient Medications   Medication Sig Dispense Refill    cholecalciferol (VITAMIN D3) 1,000 units tablet Take 1 tablet (1,000 Units total) by mouth daily      Guselkumab 100 MG/ML SOSY Inject per derm q 8 weeks      lisinopril (ZESTRIL) 40 mg tablet Take 1 tablet (40 mg total) by mouth daily 90 tablet 1    lovastatin (MEVACOR) 40 MG tablet TAKE 1 TABLET BY MOUTH EVERY DAY 90 tablet 0    sildenafil (REVATIO) 20 mg tablet Take 1-5 tablets by mouth on empty stomach one hour before sex 30 tablet 3    clobetasol (TEMOVATE) 0 05 % cream Apply 1 application topically 2 (two) times a day (Patient not taking: Reported on 5/31/2022)       No current facility-administered medications for this visit  Allergies  Allergies   Allergen Reactions    Amlodipine Hives    Penicillins Hives    Fenofibrate Itching and Rash       Past Medical History, Social History, Family History, medications and allergies were reviewed  Vitals  Vitals:    05/31/22 0805   BP: 140/80   Pulse: 71   Weight: 86 6 kg (191 lb)   Height: 5' 11" (1 803 m)       Physical Exam  Physical Exam  On examination he is in no acute distress  His abdomen is soft nontender nondistended  I cannot palpate inguinal adenopathy at this time    Phallus, scrotum scrotal contents are normal   There are no inguinal hernias  Meatus is normal   Phallus is circumcised  Digital rectal examination reveals an 80+ g prostate which is soft and without nodularity  Rectal examination is not concerning for advanced prostate cancer  Skin is warm  Extremities without edema    Neurologic is grossly intact and nonfocal   Gait normal   Affect normal     Results  Lab Results   Component Value Date    PSA 4 2 (H) 04/04/2022    PSA 4 20 (H) 10/11/2021    PSA 4 4 (H) 04/05/2021     Lab Results   Component Value Date    CALCIUM 10 1 03/07/2022    CALCIUM 10 1 03/07/2022     11/30/2017     11/30/2017     11/30/2017     11/30/2017    K 4 2 03/07/2022    CO2 27 03/07/2022     03/07/2022    BUN 23 03/07/2022    CREATININE 1 64 (H) 03/07/2022     Lab Results   Component Value Date    WBC 7 1 03/07/2022    HGB 15 1 03/07/2022    HCT 46 5 03/07/2022    MCV 80 0 03/07/2022     03/07/2022         Office Urine Dip  Recent Results (from the past 1 hour(s))   POCT urine dip    Collection Time: 05/31/22  8:12 AM   Result Value Ref Range    LEUKOCYTE ESTERASE,UA n     NITRITE,UA n     SL AMB POCT UROBILINOGEN n     POCT URINE PROTEIN n      PH,UA 2     BLOOD,UA 2     SPECIFIC GRAVITY,UA 1 015     KETONES,UA n     BILIRUBIN,UA n     GLUCOSE, UA n      COLOR,UA yellow     CLARITY,UA clear    ]

## 2022-05-31 NOTE — PROGRESS NOTES
5/31/2022    Regina Quigley  1955  [id number]        Assessment  Small volume Deanna 6 prostate cancer on active surveillance, 111 g prostate, right iliac and right inguinal lymphadenopathy, history multiple myeloma/MGUS      Discussion  I had a lengthy discussion with the patient his wife in the office today regarding his history of Hext 6 prostate cancer on active surveillance with stability for 10 years including a stable multiparametric MRI of the prostate  We focused our discussion time today on his iliac and inguinal lymphadenopathy  This is faintly positive on PSMA CT PET scan testing  He is scheduled to undergo CT-guided needle biopsy of the enlarged lymph nodes on June 13, 2022  He will return in follow-up after the biopsy results are known  If this is in fact prostate cancer the next step would be for androgen deprivation therapy with Lupron and likely oral ADT such as Zytiga or Xtandi  Further recommendations pending the results of his biopsy  History of Present Illness  61 y o  male with a history of small volume Deanna 6 prostate cancer identified on initial biopsy 10 years ago in 2012  His PSA since that time has remained stable between 3 and 5  He has had more than 1 multiparametric MRI of the prostate including his most recent multiparametric MRI which revealed a 111 g with PIRADS 2 scoring  Interestingly there was nonspecific enlarged right external iliac and inguinal lymph nodes  These had increased in size since his prior study  He has a history of smoldering multiple myeloma/MGUS  He follows with Dr Jose Eduardo Moses  The results of the multiparametric MRI prompted a PSMA PET scan which revealed slight uptake within the prostate itself and mild uptake in the iliac lymph node chain  The largest iliac lymph node measures up to 3 0 x 1 7 cm  Right inguinal lymph nodes are also enlarged as well    Typically we discussed that prostate cancer does not travel in the inguinal lymph node chain and that having locally advanced/metastatic prostate cancer with a PSA of less than 4 in Deanna 6 disease is low probability but potentially possible  His wife is present with him in the office today  I previously called the patient and arranged for a CT-guided needle biopsy to be performed in Interventional Radiology  This is scheduled for June 13, 2022  He has minimal lower urinary tract symptoms despite his enlarged prostate  He is not on alpha blockade or 5 alpha reductase inhibitors at this time  Most recent PSA 4 2  AUA Symptom Score      Review of Systems  Review of Systems   Constitutional: Negative  HENT: Negative  Eyes: Negative  Respiratory: Negative  Cardiovascular: Negative  Gastrointestinal: Negative  Endocrine: Negative  Genitourinary:        Per HPI   Musculoskeletal: Negative  Skin: Negative  Allergic/Immunologic: Negative  Neurological: Negative  Hematological: Negative  Psychiatric/Behavioral: Negative  Past Medical History  Past Medical History:   Diagnosis Date    Adenocarcinoma of prostate ([facility] [address] )     Last assessed 08/08/17    Chronic kidney disease     Colon polyp     Psoriasis        Past Social History  Past Surgical History:   Procedure Laterality Date    NV COLONOSCOPY FLX DX W/COLLJ SPEC WHEN PFRMD N/A 8/31/2018    Procedure: COLONOSCOPY;  Surgeon: Ritesh Gutierrez MD;  Location: [address] [place] [address] GI LAB;   Service: Colorectal    PROSTATE BIOPSY  02/16/2018    TONSILLECTOMY         Past Family History  Family History   Problem Relation Age of Onset    Arthritis Mother     Hyperlipidemia Mother     Hypertension Mother     Diabetes Son        Past Social history  Social History     Socioeconomic History    Marital status: /Civil Union     Spouse name: Not on file    Number of children: Not on file    Years of education: Not on file    Highest education level: Not on file   Occupational History    Occupation:  retired IT and finance   Tobacco Use    Smoking status: Never Smoker    Smokeless tobacco: Never Used   Vaping Use    Vaping Use: Never used   Substance and Sexual Activity    Alcohol use: Yes     Comment: very rarely    Drug use: No    Sexual activity: Not on file   Other Topics Concern    Not on file   Social History Narrative    Not on file     Social Determinants of Health     Financial Resource Strain: Not on file   Food Insecurity: Not on file   Transportation Needs: Not on file   Physical Activity: Not on file   Stress: Not on file   Social Connections: Not on file   Intimate Partner Violence: Not on file   Housing Stability: Not on file       Current Medications  Current Outpatient Medications   Medication Sig Dispense Refill    cholecalciferol (VITAMIN D3) 1,000 units tablet Take 1 tablet (1,000 Units total) by mouth daily      Guselkumab 100 MG/ML SOSY Inject per derm q 8 weeks      lisinopril (ZESTRIL) 40 mg tablet Take 1 tablet (40 mg total) by mouth daily 90 tablet 1    lovastatin (MEVACOR) 40 MG tablet TAKE 1 TABLET BY MOUTH EVERY DAY 90 tablet 0    sildenafil (REVATIO) 20 mg tablet Take 1-5 tablets by mouth on empty stomach one hour before sex 30 tablet 3    clobetasol (TEMOVATE) 0 05 % cream Apply 1 application topically 2 (two) times a day (Patient not taking: Reported on 5/31/2022)       No current facility-administered medications for this visit  Allergies  Allergies   Allergen Reactions    Amlodipine Hives    Penicillins Hives    Fenofibrate Itching and Rash       Past Medical History, Social History, Family History, medications and allergies were reviewed  Vitals  Vitals:    05/31/22 0805   BP: 140/80   Pulse: 71   Weight: 86 6 kg (191 lb)   Height: 5' 11" (1 803 m)       Physical Exam  Physical Exam  On examination he is in no acute distress  His abdomen is soft nontender nondistended  I cannot palpate inguinal adenopathy at this time    Phallus, scrotum scrotal contents are normal   There are no inguinal hernias  Meatus is normal   Phallus is circumcised  Digital rectal examination reveals an 80+ g prostate which is soft and without nodularity  Rectal examination is not concerning for advanced prostate cancer  Skin is warm  Extremities without edema    Neurologic is grossly intact and nonfocal   Gait normal   Affect normal     Results  Lab Results   Component Value Date    PSA 4 2 (H) 04/04/2022    PSA 4 20 (H) 10/11/2021    PSA 4 4 (H) 04/05/2021     Lab Results   Component Value Date    CALCIUM 10 1 03/07/2022    CALCIUM 10 1 03/07/2022     11/30/2017     11/30/2017     11/30/2017     11/30/2017    K 4 2 03/07/2022    CO2 27 03/07/2022     03/07/2022    BUN 23 03/07/2022    CREATININE 1 64 (H) 03/07/2022     Lab Results   Component Value Date    WBC 7 1 03/07/2022    HGB 15 1 03/07/2022    HCT 46 5 03/07/2022    MCV 80 0 03/07/2022     03/07/2022         Office Urine Dip  Recent Results (from the past 1 hour(s))   POCT urine dip    Collection Time: 05/31/22  8:12 AM   Result Value Ref Range    LEUKOCYTE ESTERASE,UA n     NITRITE,UA n     SL AMB POCT UROBILINOGEN n     POCT URINE PROTEIN n      PH,UA 2     BLOOD,UA 2     SPECIFIC GRAVITY,UA 1 015     KETONES,UA n     BILIRUBIN,UA n     GLUCOSE, UA n      COLOR,UA yellow     CLARITY,UA clear    ]

## 2022-06-04 DIAGNOSIS — E78.2 MIXED HYPERLIPIDEMIA: ICD-10-CM

## 2022-06-06 RX ORDER — LOVASTATIN 40 MG/1
TABLET ORAL
Qty: 90 TABLET | Refills: 0 | Status: SHIPPED | OUTPATIENT
Start: 2022-06-06

## 2022-06-06 NOTE — TELEPHONE ENCOUNTER
Patient called stating he is having a colonoscopy done on 06/07/22 tomorrow and he wants to know if that would effect him have the biopsy on on 06/13/22       Patient can be reached at [phone]

## 2022-06-06 NOTE — TELEPHONE ENCOUNTER
Contacted patient and made him aware he can proceed with his colonoscopy tomorrow as scheduled  He verbalized understanding

## 2022-06-07 PROCEDURE — 88305 TISSUE EXAM BY PATHOLOGIST: CPT | Performed by: PATHOLOGY

## 2022-06-08 ENCOUNTER — LAB REQUISITION (OUTPATIENT)
Dept: LAB | Facility: HOSPITAL | Age: 67
End: 2022-06-08
Payer: COMMERCIAL

## 2022-06-08 DIAGNOSIS — Z86.010 PERSONAL HISTORY OF COLONIC POLYPS: ICD-10-CM

## 2022-06-08 DIAGNOSIS — Z12.11 ENCOUNTER FOR SCREENING FOR MALIGNANT NEOPLASM OF COLON: ICD-10-CM

## 2022-06-11 DIAGNOSIS — I10 HYPERTENSION, UNSPECIFIED TYPE: ICD-10-CM

## 2022-06-13 ENCOUNTER — HOSPITAL ENCOUNTER (OUTPATIENT)
Dept: RADIOLOGY | Facility: HOSPITAL | Age: 67
Discharge: HOME | End: 2022-06-13
Attending: RADIOLOGY
Payer: COMMERCIAL

## 2022-06-13 VITALS
OXYGEN SATURATION: 97 % | BODY MASS INDEX: 26.67 KG/M2 | HEIGHT: 71 IN | DIASTOLIC BLOOD PRESSURE: 81 MMHG | TEMPERATURE: 97.7 F | WEIGHT: 190.48 LBS | RESPIRATION RATE: 16 BRPM | SYSTOLIC BLOOD PRESSURE: 154 MMHG | HEART RATE: 69 BPM

## 2022-06-13 DIAGNOSIS — C61 ADENOCARCINOMA OF PROSTATE (HCC): ICD-10-CM

## 2022-06-13 LAB
ANION GAP SERPL CALCULATED.3IONS-SCNC: 6 MMOL/L (ref 4–13)
BASOPHILS # BLD AUTO: 0.02 THOUSANDS/ΜL (ref 0–0.1)
BASOPHILS NFR BLD AUTO: 0 % (ref 0–1)
BUN SERPL-MCNC: 28 MG/DL (ref 5–25)
CALCIUM SERPL-MCNC: 9.6 MG/DL (ref 8.3–10.1)
CHLORIDE SERPL-SCNC: 104 MMOL/L (ref 100–108)
CO2 SERPL-SCNC: 29 MMOL/L (ref 21–32)
CREAT SERPL-MCNC: 1.99 MG/DL (ref 0.6–1.3)
EOSINOPHIL # BLD AUTO: 0.31 THOUSAND/ΜL (ref 0–0.61)
EOSINOPHIL NFR BLD AUTO: 4 % (ref 0–6)
ERYTHROCYTE [DISTWIDTH] IN BLOOD BY AUTOMATED COUNT: 15 % (ref 11.6–15.1)
GFR SERPL CREATININE-BSD FRML MDRD: 34 ML/MIN/1.73SQ M
GLUCOSE P FAST SERPL-MCNC: 88 MG/DL (ref 65–99)
GLUCOSE SERPL-MCNC: 88 MG/DL (ref 65–140)
HCT VFR BLD AUTO: 47.2 % (ref 36.5–49.3)
HGB BLD-MCNC: 14.6 G/DL (ref 12–17)
IMM GRANULOCYTES # BLD AUTO: 0.03 THOUSAND/UL (ref 0–0.2)
IMM GRANULOCYTES NFR BLD AUTO: 0 % (ref 0–2)
INR PPP: 0.97 (ref 0.84–1.19)
LYMPHOCYTES # BLD AUTO: 1.47 THOUSANDS/ΜL (ref 0.6–4.47)
LYMPHOCYTES NFR BLD AUTO: 20 % (ref 14–44)
MCH RBC QN AUTO: 26.6 PG (ref 26.8–34.3)
MCHC RBC AUTO-ENTMCNC: 30.9 G/DL (ref 31.4–37.4)
MCV RBC AUTO: 86 FL (ref 82–98)
MONOCYTES # BLD AUTO: 0.88 THOUSAND/ΜL (ref 0.17–1.22)
MONOCYTES NFR BLD AUTO: 12 % (ref 4–12)
NEUTROPHILS # BLD AUTO: 4.68 THOUSANDS/ΜL (ref 1.85–7.62)
NEUTS SEG NFR BLD AUTO: 64 % (ref 43–75)
NRBC BLD AUTO-RTO: 0 /100 WBCS
PLATELET # BLD AUTO: 142 THOUSANDS/UL (ref 149–390)
PMV BLD AUTO: 9.8 FL (ref 8.9–12.7)
POTASSIUM SERPL-SCNC: 3.8 MMOL/L (ref 3.5–5.3)
PROTHROMBIN TIME: 12.6 SECONDS (ref 11.6–14.5)
RBC # BLD AUTO: 5.48 MILLION/UL (ref 3.88–5.62)
SODIUM SERPL-SCNC: 139 MMOL/L (ref 136–145)
WBC # BLD AUTO: 7.39 THOUSAND/UL (ref 4.31–10.16)

## 2022-06-13 PROCEDURE — 88341 IMHCHEM/IMCYTCHM EA ADD ANTB: CPT | Performed by: PATHOLOGY

## 2022-06-13 PROCEDURE — 80048 BASIC METABOLIC PNL TOTAL CA: CPT | Performed by: INTERNAL MEDICINE

## 2022-06-13 PROCEDURE — 76942 ECHO GUIDE FOR BIOPSY: CPT

## 2022-06-13 PROCEDURE — 99152 MOD SED SAME PHYS/QHP 5/>YRS: CPT

## 2022-06-13 PROCEDURE — 85610 PROTHROMBIN TIME: CPT | Performed by: INTERNAL MEDICINE

## 2022-06-13 PROCEDURE — 88307 TISSUE EXAM BY PATHOLOGIST: CPT | Performed by: PATHOLOGY

## 2022-06-13 PROCEDURE — 76942 ECHO GUIDE FOR BIOPSY: CPT | Performed by: INTERNAL MEDICINE

## 2022-06-13 PROCEDURE — 88184 FLOWCYTOMETRY/ TC 1 MARKER: CPT | Performed by: UROLOGY

## 2022-06-13 PROCEDURE — 99152 MOD SED SAME PHYS/QHP 5/>YRS: CPT | Performed by: INTERNAL MEDICINE

## 2022-06-13 PROCEDURE — 38505 NEEDLE BIOPSY LYMPH NODES: CPT

## 2022-06-13 PROCEDURE — 88365 INSITU HYBRIDIZATION (FISH): CPT | Performed by: PATHOLOGY

## 2022-06-13 PROCEDURE — 88333 PATH CONSLTJ SURG CYTO XM 1: CPT | Performed by: PATHOLOGY

## 2022-06-13 PROCEDURE — 88342 IMHCHEM/IMCYTCHM 1ST ANTB: CPT | Performed by: PATHOLOGY

## 2022-06-13 PROCEDURE — 85025 COMPLETE CBC W/AUTO DIFF WBC: CPT | Performed by: INTERNAL MEDICINE

## 2022-06-13 PROCEDURE — 99153 MOD SED SAME PHYS/QHP EA: CPT

## 2022-06-13 PROCEDURE — 38505 NEEDLE BIOPSY LYMPH NODES: CPT | Performed by: INTERNAL MEDICINE

## 2022-06-13 PROCEDURE — 88185 FLOWCYTOMETRY/TC ADD-ON: CPT

## 2022-06-13 RX ORDER — SODIUM CHLORIDE 9 MG/ML
75 INJECTION, SOLUTION INTRAVENOUS CONTINUOUS
Status: DISCONTINUED | OUTPATIENT
Start: 2022-06-13 | End: 2022-06-14 | Stop reason: HOSPADM

## 2022-06-13 RX ORDER — FENTANYL CITRATE 50 UG/ML
INJECTION, SOLUTION INTRAMUSCULAR; INTRAVENOUS CODE/TRAUMA/SEDATION MEDICATION
Status: COMPLETED | OUTPATIENT
Start: 2022-06-13 | End: 2022-06-13

## 2022-06-13 RX ORDER — LIDOCAINE WITH 8.4% SOD BICARB 0.9%(10ML)
SYRINGE (ML) INJECTION CODE/TRAUMA/SEDATION MEDICATION
Status: COMPLETED | OUTPATIENT
Start: 2022-06-13 | End: 2022-06-13

## 2022-06-13 RX ORDER — LISINOPRIL 40 MG/1
TABLET ORAL
Qty: 90 TABLET | Refills: 1 | Status: SHIPPED | OUTPATIENT
Start: 2022-06-13

## 2022-06-13 RX ORDER — MIDAZOLAM HYDROCHLORIDE 2 MG/2ML
INJECTION, SOLUTION INTRAMUSCULAR; INTRAVENOUS CODE/TRAUMA/SEDATION MEDICATION
Status: COMPLETED | OUTPATIENT
Start: 2022-06-13 | End: 2022-06-13

## 2022-06-13 RX ADMIN — MIDAZOLAM 1 MG: 1 INJECTION INTRAMUSCULAR; INTRAVENOUS at 09:51

## 2022-06-13 RX ADMIN — FENTANYL CITRATE 50 MCG: 50 INJECTION INTRAMUSCULAR; INTRAVENOUS at 09:51

## 2022-06-13 RX ADMIN — FENTANYL CITRATE 25 MCG: 50 INJECTION INTRAMUSCULAR; INTRAVENOUS at 10:08

## 2022-06-13 RX ADMIN — Medication 10 ML: at 10:00

## 2022-06-13 RX ADMIN — SODIUM CHLORIDE 75 ML/HR: 0.9 INJECTION, SOLUTION INTRAVENOUS at 08:40

## 2022-06-13 RX ADMIN — MIDAZOLAM 0.5 MG: 1 INJECTION INTRAMUSCULAR; INTRAVENOUS at 10:08

## 2022-06-13 NOTE — DISCHARGE INSTRUCTIONS
POST BIOPSY    Care after your procedure:    1  Limit your activities for 24 hours after your biopsy  2  No driving day of biopsy  3  Return to your normal diet  Small sips of flat soda will help with mild nausea  4  Remove band-aid or dressing 24 hours after procedure  Contact Interventional Radiology at [phone] [place] PATIENTS: Contact Interventional Radiology at [phone]) [place] PATIENTS: Contact Interventional Radiology at [phone]) if:    1  Difficulty breathing, nausea or vomiting  2  Chills or fever above 101 degrees F      3  Pain at biopsy site not relieved by medication  4  Develop any redness, swelling, heat, unusual drainage, heavy bruising or bleeding from biopsy site  Moderate Sedation   WHAT YOU NEED TO KNOW:   Moderate sedation, or conscious sedation, is medicine used during procedures to help you feel relaxed and calm  You will be awake and able to follow directions without anxiety or pain  You will remember little to none of the procedure  You may feel tired, weak, or unsteady on your feet after you get sedation  You may also have trouble concentrating or short-term memory loss  These symptoms should go away in 24 hours or less  DISCHARGE INSTRUCTIONS:   Call 911 or have someone else call for any of the following: You have sudden trouble breathing  You cannot be woken  Seek care immediately if:   You have a severe headache or dizziness  Your heart is beating faster than usual   Contact your healthcare provider if:   You have a fever  You have nausea or are vomiting for more than 8 hours after the procedure  Your skin is itchy, swollen, or you have a rash  You have questions or concerns about your condition or care  Self-care:   Have someone stay with you for 24 hours  This person can drive you to errands and help you do things around the house  This person can also watch for problems        Rest and do quiet activities for 24 hours  Do not exercise, ride a bike, or play sports  Stand up slowly to prevent dizziness and falls  Take short walks around the house with another person  Slowly return to your usual activities the next day  Do not drive or use dangerous machines or tools for 24 hours  You may injure yourself or others  Examples include a lawnmower, saw, or drill  Do not return to work for 24 hours if you use dangerous machines or tools for work  Do not make important decisions for 24 hours  For example, do not sign important papers or invest money  Drink liquids as directed  Liquids help flush the sedation medicine out of your body  Ask how much liquid to drink each day and which liquids are best for you  Eat small, frequent meals to prevent nausea and vomiting  Start with clear liquids such as juice or broth  If you do not vomit after clear liquids, you can eat your usual foods  Do not drink alcohol or take medicines that make you drowsy  This includes medicines that help you sleep and anxiety medicines  Ask your healthcare provider if it is safe for you to take pain medicine  Follow up with your healthcare provider as directed: Write down your questions so you remember to ask them during your visits  © 2017 [address] [facility] Information is for End User's use only and may not be sold, redistributed or otherwise used for commercial purposes  All illustrations and images included in CareNotes® are the copyrighted property of [employer]  or Dustin Blanchard  The above information is an  only  It is not intended as medical advice for individual conditions or treatments  Talk to your doctor, nurse or pharmacist before following any medical regimen to see if it is safe and effective for you

## 2022-06-13 NOTE — INTERVAL H&P NOTE
Update: (This section must be completed if the H&P was completed greater than 24 hrs to procedure or admission)    H&P reviewed  After examining the patient, I find no changed to the H&P since it had been written  BP (!) 177/80   Pulse 70   Temp 97 5 °F (36 4 °C) (Temporal)   Resp 16   Ht 5' 11" (1 803 m)   Wt 86 4 kg (190 lb 7 6 oz)   SpO2 99%   BMI 26 57 kg/m²     Patient re-evaluated  Accept as history and physical     We will proceed with biopsy of a right external iliac lymph node today      Miles Crabtree MD/June 13, 2022/9:48 AM

## 2022-06-14 NOTE — BRIEF OP NOTE (RAD/CATH)
INTERVENTIONAL RADIOLOGY PROCEDURE NOTE    Date: 6/14/2022    Procedure: IR BIOPSY LYMPH NODE    Preoperative diagnosis:   1  Adenocarcinoma of prostate ([facility] [address] )         Postoperative diagnosis: Same  Surgeon: Junior Rose MD     Assistant: None  No qualified resident was available  Blood loss: None    Specimens: Multiple core biopsy specimens     Findings: Ultrasound-guided core biopsy of a right external iliac lymph node  Complications: None immediate      Anesthesia: conscious sedation and local

## 2022-06-15 LAB — SCAN RESULT: NORMAL

## 2022-06-23 ENCOUNTER — APPOINTMENT (OUTPATIENT)
Dept: LAB | Facility: CLINIC | Age: 67
End: 2022-06-23
Payer: COMMERCIAL

## 2022-06-23 DIAGNOSIS — D47.2 SMOLDERING MULTIPLE MYELOMA: ICD-10-CM

## 2022-06-23 DIAGNOSIS — C61 ADENOCARCINOMA OF PROSTATE (HCC): ICD-10-CM

## 2022-06-23 LAB
ALBUMIN SERPL BCP-MCNC: 3.2 G/DL (ref 3.5–5)
ALP SERPL-CCNC: 59 U/L (ref 46–116)
ALT SERPL W P-5'-P-CCNC: 20 U/L (ref 12–78)
ANION GAP SERPL CALCULATED.3IONS-SCNC: 5 MMOL/L (ref 4–13)
AST SERPL W P-5'-P-CCNC: 17 U/L (ref 5–45)
BASOPHILS # BLD AUTO: 0.02 THOUSANDS/ΜL (ref 0–0.1)
BASOPHILS NFR BLD AUTO: 0 % (ref 0–1)
BILIRUB SERPL-MCNC: 0.46 MG/DL (ref 0.2–1)
BUN SERPL-MCNC: 30 MG/DL (ref 5–25)
CALCIUM ALBUM COR SERPL-MCNC: 10.4 MG/DL (ref 8.3–10.1)
CALCIUM SERPL-MCNC: 9.8 MG/DL (ref 8.3–10.1)
CHLORIDE SERPL-SCNC: 111 MMOL/L (ref 100–108)
CO2 SERPL-SCNC: 24 MMOL/L (ref 21–32)
CREAT SERPL-MCNC: 1.82 MG/DL (ref 0.6–1.3)
EOSINOPHIL # BLD AUTO: 0.34 THOUSAND/ΜL (ref 0–0.61)
EOSINOPHIL NFR BLD AUTO: 4 % (ref 0–6)
ERYTHROCYTE [DISTWIDTH] IN BLOOD BY AUTOMATED COUNT: 15.4 % (ref 11.6–15.1)
GFR SERPL CREATININE-BSD FRML MDRD: 38 ML/MIN/1.73SQ M
GLUCOSE P FAST SERPL-MCNC: 87 MG/DL (ref 65–99)
HCT VFR BLD AUTO: 47.8 % (ref 36.5–49.3)
HGB BLD-MCNC: 14.6 G/DL (ref 12–17)
IGA SERPL-MCNC: 168 MG/DL (ref 70–400)
IGG SERPL-MCNC: 1330 MG/DL (ref 700–1600)
IGM SERPL-MCNC: 54 MG/DL (ref 40–230)
IMM GRANULOCYTES # BLD AUTO: 0.03 THOUSAND/UL (ref 0–0.2)
IMM GRANULOCYTES NFR BLD AUTO: 0 % (ref 0–2)
LYMPHOCYTES # BLD AUTO: 1.88 THOUSANDS/ΜL (ref 0.6–4.47)
LYMPHOCYTES NFR BLD AUTO: 23 % (ref 14–44)
MCH RBC QN AUTO: 26.1 PG (ref 26.8–34.3)
MCHC RBC AUTO-ENTMCNC: 30.5 G/DL (ref 31.4–37.4)
MCV RBC AUTO: 85 FL (ref 82–98)
MONOCYTES # BLD AUTO: 1.02 THOUSAND/ΜL (ref 0.17–1.22)
MONOCYTES NFR BLD AUTO: 12 % (ref 4–12)
NEUTROPHILS # BLD AUTO: 4.91 THOUSANDS/ΜL (ref 1.85–7.62)
NEUTS SEG NFR BLD AUTO: 61 % (ref 43–75)
NRBC BLD AUTO-RTO: 0 /100 WBCS
PLATELET # BLD AUTO: 165 THOUSANDS/UL (ref 149–390)
PMV BLD AUTO: 10.5 FL (ref 8.9–12.7)
POTASSIUM SERPL-SCNC: 4.3 MMOL/L (ref 3.5–5.3)
PROT SERPL-MCNC: 7.6 G/DL (ref 6.4–8.2)
RBC # BLD AUTO: 5.6 MILLION/UL (ref 3.88–5.62)
SODIUM SERPL-SCNC: 140 MMOL/L (ref 136–145)
WBC # BLD AUTO: 8.2 THOUSAND/UL (ref 4.31–10.16)

## 2022-06-23 PROCEDURE — 80053 COMPREHEN METABOLIC PANEL: CPT

## 2022-06-23 PROCEDURE — 86334 IMMUNOFIX E-PHORESIS SERUM: CPT | Performed by: PATHOLOGY

## 2022-06-23 PROCEDURE — 82784 ASSAY IGA/IGD/IGG/IGM EACH: CPT

## 2022-06-23 PROCEDURE — 86334 IMMUNOFIX E-PHORESIS SERUM: CPT

## 2022-06-23 PROCEDURE — 84165 PROTEIN E-PHORESIS SERUM: CPT | Performed by: PATHOLOGY

## 2022-06-23 PROCEDURE — 84165 PROTEIN E-PHORESIS SERUM: CPT

## 2022-06-23 PROCEDURE — 85025 COMPLETE CBC W/AUTO DIFF WBC: CPT

## 2022-06-23 PROCEDURE — 83521 IG LIGHT CHAINS FREE EACH: CPT

## 2022-06-23 PROCEDURE — 36415 COLL VENOUS BLD VENIPUNCTURE: CPT

## 2022-06-24 LAB
ALBUMIN SERPL ELPH-MCNC: 4.15 G/DL (ref 3.5–5)
ALBUMIN SERPL ELPH-MCNC: 57.7 % (ref 52–65)
ALPHA1 GLOB SERPL ELPH-MCNC: 0.33 G/DL (ref 0.1–0.4)
ALPHA1 GLOB SERPL ELPH-MCNC: 4.6 % (ref 2.5–5)
ALPHA2 GLOB SERPL ELPH-MCNC: 0.71 G/DL (ref 0.4–1.2)
ALPHA2 GLOB SERPL ELPH-MCNC: 9.9 % (ref 7–13)
BETA GLOB ABNORMAL SERPL ELPH-MCNC: 0.37 G/DL (ref 0.4–0.8)
BETA1 GLOB SERPL ELPH-MCNC: 5.1 % (ref 5–13)
BETA2 GLOB SERPL ELPH-MCNC: 4.9 % (ref 2–8)
BETA2+GAMMA GLOB SERPL ELPH-MCNC: 0.35 G/DL (ref 0.2–0.5)
GAMMA GLOB ABNORMAL SERPL ELPH-MCNC: 1.28 G/DL (ref 0.5–1.6)
GAMMA GLOB SERPL ELPH-MCNC: 17.8 % (ref 12–22)
IGG/ALB SER: 1.36 {RATIO} (ref 1.1–1.8)
INTERPRETATION UR IFE-IMP: NORMAL
KAPPA LC FREE SER-MCNC: 62 MG/L (ref 3.3–19.4)
KAPPA LC FREE/LAMBDA FREE SER: 3.73 {RATIO} (ref 0.26–1.65)
LAMBDA LC FREE SERPL-MCNC: 16.6 MG/L (ref 5.7–26.3)
M PROTEIN 1 MFR SERPL ELPH: 7 %
M PROTEIN 1 SERPL ELPH-MCNC: 0.5 G/DL
PROT PATTERN SERPL ELPH-IMP: ABNORMAL
PROT SERPL-MCNC: 7.2 G/DL (ref 6.4–8.2)

## 2022-06-28 ENCOUNTER — APPOINTMENT (OUTPATIENT)
Dept: LAB | Facility: CLINIC | Age: 67
End: 2022-06-28
Payer: COMMERCIAL

## 2022-06-28 ENCOUNTER — OFFICE VISIT (OUTPATIENT)
Dept: HEMATOLOGY ONCOLOGY | Facility: CLINIC | Age: 67
End: 2022-06-28
Payer: COMMERCIAL

## 2022-06-28 VITALS
SYSTOLIC BLOOD PRESSURE: 142 MMHG | OXYGEN SATURATION: 97 % | HEIGHT: 71 IN | RESPIRATION RATE: 16 BRPM | DIASTOLIC BLOOD PRESSURE: 86 MMHG | HEART RATE: 73 BPM | BODY MASS INDEX: 26.6 KG/M2 | TEMPERATURE: 97.5 F | WEIGHT: 190 LBS

## 2022-06-28 DIAGNOSIS — N18.2 STAGE 2 CHRONIC KIDNEY DISEASE: ICD-10-CM

## 2022-06-28 DIAGNOSIS — E78.2 MIXED HYPERLIPIDEMIA: ICD-10-CM

## 2022-06-28 DIAGNOSIS — L40.9 PSORIASIS: ICD-10-CM

## 2022-06-28 DIAGNOSIS — R80.9 PROTEINURIA, UNSPECIFIED TYPE: ICD-10-CM

## 2022-06-28 DIAGNOSIS — D47.2 SMOLDERING MULTIPLE MYELOMA: Primary | ICD-10-CM

## 2022-06-28 DIAGNOSIS — I10 ESSENTIAL HYPERTENSION: ICD-10-CM

## 2022-06-28 DIAGNOSIS — C61 ADENOCARCINOMA OF PROSTATE (HCC): ICD-10-CM

## 2022-06-28 LAB
25(OH)D3 SERPL-MCNC: 37.2 NG/ML (ref 30–100)
ALBUMIN SERPL BCP-MCNC: 3.2 G/DL (ref 3.5–5)
ALP SERPL-CCNC: 57 U/L (ref 46–116)
ALT SERPL W P-5'-P-CCNC: 20 U/L (ref 12–78)
ANION GAP SERPL CALCULATED.3IONS-SCNC: 6 MMOL/L (ref 4–13)
AST SERPL W P-5'-P-CCNC: 17 U/L (ref 5–45)
BACTERIA UR QL AUTO: ABNORMAL /HPF
BASOPHILS # BLD AUTO: 0.02 THOUSANDS/ΜL (ref 0–0.1)
BASOPHILS NFR BLD AUTO: 0 % (ref 0–1)
BILIRUB SERPL-MCNC: 0.34 MG/DL (ref 0.2–1)
BILIRUB UR QL STRIP: NEGATIVE
BUN SERPL-MCNC: 22 MG/DL (ref 5–25)
CALCIUM ALBUM COR SERPL-MCNC: 10.4 MG/DL (ref 8.3–10.1)
CALCIUM SERPL-MCNC: 9.8 MG/DL (ref 8.3–10.1)
CHLORIDE SERPL-SCNC: 111 MMOL/L (ref 100–108)
CHOLEST SERPL-MCNC: 185 MG/DL
CLARITY UR: CLEAR
CO2 SERPL-SCNC: 24 MMOL/L (ref 21–32)
COLOR UR: ABNORMAL
CREAT SERPL-MCNC: 1.65 MG/DL (ref 0.6–1.3)
CREAT UR-MCNC: 101 MG/DL
EOSINOPHIL # BLD AUTO: 0.31 THOUSAND/ΜL (ref 0–0.61)
EOSINOPHIL NFR BLD AUTO: 4 % (ref 0–6)
ERYTHROCYTE [DISTWIDTH] IN BLOOD BY AUTOMATED COUNT: 16 % (ref 11.6–15.1)
GFR SERPL CREATININE-BSD FRML MDRD: 43 ML/MIN/1.73SQ M
GLUCOSE P FAST SERPL-MCNC: 76 MG/DL (ref 65–99)
GLUCOSE UR STRIP-MCNC: NEGATIVE MG/DL
HCT VFR BLD AUTO: 49.2 % (ref 36.5–49.3)
HDLC SERPL-MCNC: 37 MG/DL
HGB BLD-MCNC: 14.9 G/DL (ref 12–17)
HGB UR QL STRIP.AUTO: ABNORMAL
IMM GRANULOCYTES # BLD AUTO: 0.03 THOUSAND/UL (ref 0–0.2)
IMM GRANULOCYTES NFR BLD AUTO: 0 % (ref 0–2)
KETONES UR STRIP-MCNC: NEGATIVE MG/DL
LDLC SERPL CALC-MCNC: 108 MG/DL (ref 0–100)
LEUKOCYTE ESTERASE UR QL STRIP: NEGATIVE
LYMPHOCYTES # BLD AUTO: 2.17 THOUSANDS/ΜL (ref 0.6–4.47)
LYMPHOCYTES NFR BLD AUTO: 28 % (ref 14–44)
MCH RBC QN AUTO: 26 PG (ref 26.8–34.3)
MCHC RBC AUTO-ENTMCNC: 30.3 G/DL (ref 31.4–37.4)
MCV RBC AUTO: 86 FL (ref 82–98)
MONOCYTES # BLD AUTO: 0.94 THOUSAND/ΜL (ref 0.17–1.22)
MONOCYTES NFR BLD AUTO: 12 % (ref 4–12)
NEUTROPHILS # BLD AUTO: 4.37 THOUSANDS/ΜL (ref 1.85–7.62)
NEUTS SEG NFR BLD AUTO: 56 % (ref 43–75)
NITRITE UR QL STRIP: NEGATIVE
NON-SQ EPI CELLS URNS QL MICRO: ABNORMAL /HPF
NRBC BLD AUTO-RTO: 0 /100 WBCS
PH UR STRIP.AUTO: 6 [PH]
PHOSPHATE SERPL-MCNC: 3.1 MG/DL (ref 2.3–4.1)
PLATELET # BLD AUTO: 201 THOUSANDS/UL (ref 149–390)
PMV BLD AUTO: 10.8 FL (ref 8.9–12.7)
POTASSIUM SERPL-SCNC: 4.2 MMOL/L (ref 3.5–5.3)
PROT SERPL-MCNC: 7.3 G/DL (ref 6.4–8.2)
PROT UR STRIP-MCNC: ABNORMAL MG/DL
PROT UR-MCNC: 136 MG/DL
PROT/CREAT UR: 1.35 MG/G{CREAT} (ref 0–0.1)
PSA SERPL-MCNC: 3.8 NG/ML (ref 0–4)
PTH-INTACT SERPL-MCNC: 73.9 PG/ML (ref 18.4–80.1)
RBC # BLD AUTO: 5.72 MILLION/UL (ref 3.88–5.62)
RBC #/AREA URNS AUTO: ABNORMAL /HPF
SODIUM SERPL-SCNC: 141 MMOL/L (ref 136–145)
SP GR UR STRIP.AUTO: 1.01 (ref 1–1.03)
TRIGL SERPL-MCNC: 199 MG/DL
TSH SERPL DL<=0.05 MIU/L-ACNC: 1.91 UIU/ML (ref 0.45–4.5)
UROBILINOGEN UR STRIP-ACNC: <2 MG/DL
WBC # BLD AUTO: 7.84 THOUSAND/UL (ref 4.31–10.16)
WBC #/AREA URNS AUTO: ABNORMAL /HPF

## 2022-06-28 PROCEDURE — G0103 PSA SCREENING: HCPCS

## 2022-06-28 PROCEDURE — 84443 ASSAY THYROID STIM HORMONE: CPT

## 2022-06-28 PROCEDURE — 3008F BODY MASS INDEX DOCD: CPT | Performed by: UROLOGY

## 2022-06-28 PROCEDURE — 83970 ASSAY OF PARATHORMONE: CPT

## 2022-06-28 PROCEDURE — 36415 COLL VENOUS BLD VENIPUNCTURE: CPT

## 2022-06-28 PROCEDURE — 84156 ASSAY OF PROTEIN URINE: CPT

## 2022-06-28 PROCEDURE — 80053 COMPREHEN METABOLIC PANEL: CPT

## 2022-06-28 PROCEDURE — 85025 COMPLETE CBC W/AUTO DIFF WBC: CPT

## 2022-06-28 PROCEDURE — 99213 OFFICE O/P EST LOW 20 MIN: CPT | Performed by: INTERNAL MEDICINE

## 2022-06-28 PROCEDURE — 81001 URINALYSIS AUTO W/SCOPE: CPT

## 2022-06-28 PROCEDURE — 82306 VITAMIN D 25 HYDROXY: CPT

## 2022-06-28 PROCEDURE — 80061 LIPID PANEL: CPT

## 2022-06-28 PROCEDURE — 84100 ASSAY OF PHOSPHORUS: CPT

## 2022-06-28 PROCEDURE — 82570 ASSAY OF URINE CREATININE: CPT

## 2022-06-28 NOTE — PROGRESS NOTES
Hematology Outpatient Follow - Up Note  Rosalva Bryant 61 y o  male MRN: @ Encounter: [id number]        Date:  6/28/2022        Assessment/ Plan:    1  Kappa light chain restriction smoldering multiple myeloma with M protein of 0 3 gram/deciliter, kappa light chain 65, lambda light chain 18 with a ratio of 3 58, creatinine 1 64, normal albumin, total protein, calcium, await for PET scan, this is most likely smoldering multiple myeloma, we talked about progression into edilson multiple myeloma 10% year however the patient has psoriatic arthritis and this might be the reason for elevation of kappa light chain and stimulation of the bone marrow plasma cells     Stable kappa light chain and M protein at 0 5 gram/deciliter, continue watchful observation and follow-up in 4 months with CBC, CMP, SPEP, free light chain    Prostate cancer Deanna score 6, biopsy of the left iliac lymph node came back negative for malignancy, continue watchful observation, elevated hemoglobin workup was negative for JAK2 mutation, MPL 1 mutation, CALR mutation as well as normal hemoglobin electrophoresis        Labs and imaging studies are reviewed by ordering provider once results are available  If there are findings that need immediate attention, you will be contacted when results available  Discussing results and the implication on your healthcare is best discussed in person at your follow-up visit  HPI:    Tushar Fuller a 89 B  o  seen for initial consultation 12/5/2019 at the referral of Jessi Rowland DO regarding MGUS and elevated H/H        10/21/2019:  SPEP with immunofixation identified IgG kappa monoclonal band measuring 0 3 gram/deciliter     9/4/2019:  Normal quantitative immunoglobulins   Creatinine 1 61 otherwise normal CMP    Creatinine has been steadily been increasing slowly since 2016  Dominic Carlson follows with Dr Nik Sullivan regarding his CKD     Hemoglobin 16 3, hematocrit 51 2, white blood cell count 8 2 with normal differential, platelet count 463     He feels well   Denies any fevers, chills, sweats  Dejuansae Mezaelizabeth has been stable     He has been retired since 64years old  Zhang Ray frequently with his wife who is also retired      Does not smoke or drink      He has psoriasis followed by Dr Casper Pear        He follows with Dr Hermes Ramirez volume Deanna 6 prostate cancer diagnosed on an initial prostate biopsy in 2017   In 2018,a surveillance biopsy again showed 3 of 12 cores with Deanna 6 prostate cancer   He is on active surveillance     Workup for MGUS 12/2019 showed normal quantitative immunoglobulins, kappa light chain 14 7, lambda light chain 11 1, erythropoietin 5 4, negative for JAK2 mutation profile as well as MPL 1, CALR for polycythemia, hemoglobin electrophoresis normal       Component      Latest Ref Rng & Units 11/20/2017 12/16/2019   IMMUNOGLOBULIN KAPPA FREE LIGHT CHAIN      3 3 - 19 4 mg/L 23 8 (H) 14 7   IMMUNOGLOBULIN LAMBDA FREE LIGHT CHAIN      5 7 - 26 3 mg/L 18 8 11 1   KAPPA/LAMBDA FREE LIGHT CHAIN RATIO      0 26 - 1 65 1 27 1 32      Component      Latest Ref Rng & Units 7/13/2020 6/3/2021 3/7/2022   IMMUNOGLOBULIN KAPPA FREE LIGHT CHAIN      3 3 - 19 4 mg/L 29 6 (H) 41 5 (H) 65 1 (H)   IMMUNOGLOBULIN LAMBDA FREE LIGHT CHAIN      5 7 - 26 3 mg/L 16 5 21 8 18 2   KAPPA/LAMBDA FREE LIGHT CHAIN RATIO      0 26 - 1 65 1 79 (H) 1 90 (H) 3 58 (H)         Bone marrow biopsy on April 2022 showed 12-15% plasma cells, normal cytogenetics, fish panel for multiple myeloma is normal, molecular test however showed NF1 variant of unknown clinical significance    Prostate cancer Champlin score of 6 by MRI of the prostate on 04/2022 involving 2 of 4 submitted cores no evidence of perineural invasion    PET scan PSA may showed active left iliac lymph node, biopsy was inconclusive for metastatic carcinoma or lymphoma  Interval History:        Previous Treatment:         Test Results:    Imaging: IR biopsy lymph node    Result Date: 6/14/2022  Narrative: Ultrasound-guided external iliac lymph node biopsy Clinical History: 62-year-old male with history of prostate cancer, now with hypermetabolic enlarged right external iliac lymph node present  Conscious sedation time: 45 MINUTES Technique: The patient was brought to the interventional radiology area and placed supine on the stretcher  Prior imaging studies were reviewed  After a brief survey of the right inguinal region with ultrasound, a site on the skin was marked, prepped, and draped in the usual sterile fashion  Lidocaine was administered to the skin and a small skin incision was made  A 17-gauge cannula was placed percutaneously into right external iliac lymph node under direct ultrasound guidance  Through this cannula, 5 passes were made with an 18 gauge coring needle  After the specimen was obtained, D-Stat was used for hemostasis  The specimen was sent to the lab in formalin  The patient tolerated the procedure well and suffered no complications  Impression: Impression: Successful ultrasound guided core biopsy of a right external iliac lymph node    Workstation performed: [id number]       Labs:   Lab Results   Component Value Date    WBC 7 84 06/28/2022    HGB 14 9 06/28/2022    HCT 49 2 06/28/2022    MCV 86 06/28/2022     06/28/2022     Lab Results   Component Value Date     11/30/2017     11/30/2017     11/30/2017     11/30/2017    K 4 2 06/28/2022     (H) 06/28/2022    CO2 24 06/28/2022    BUN 22 06/28/2022    CREATININE 1 65 (H) 06/28/2022    GLUF 76 06/28/2022    CALCIUM 9 8 06/28/2022    CORRECTEDCA 10 4 (H) 06/28/2022    AST 17 06/28/2022    ALT 20 06/28/2022    ALKPHOS 57 06/28/2022    PROT 7 1 11/30/2017    PROT 7 1 11/30/2017    PROT 7 1 11/30/2017    PROT 7 1 11/30/2017    BILITOT 0 5 11/30/2017    BILITOT 0 5 11/30/2017    BILITOT 0 5 11/30/2017    BILITOT 0 5 11/30/2017    EGFR 43 06/28/2022       No results found for: IRON, TIBC, FERRITIN    Lab Results   Component Value Date    [id number] 08/08/2017         ROS: Review of Systems   Constitutional: Negative  Negative for appetite change, chills, diaphoresis, fatigue, fever and unexpected weight change  HENT:   Negative for hearing loss, lump/mass, mouth sores, nosebleeds, sore throat, trouble swallowing and voice change  Eyes: Negative  Negative for eye problems and icterus  Respiratory: Negative  Negative for chest tightness, cough, hemoptysis and shortness of breath  Cardiovascular: Negative for chest pain and leg swelling  Gastrointestinal: Negative for abdominal distention, abdominal pain, blood in stool, constipation, diarrhea and nausea  Endocrine: Negative  Genitourinary: Negative for dysuria, frequency, hematuria and pelvic pain  Musculoskeletal: Negative  Negative for arthralgias, back pain, flank pain, gait problem, myalgias and neck stiffness  Skin: Negative for itching and rash  Neurological: Negative for dizziness, gait problem, headaches, light-headedness, numbness and speech difficulty  Hematological: Negative for adenopathy  Does not bruise/bleed easily  Psychiatric/Behavioral: Negative for confusion, decreased concentration, depression and sleep disturbance  The patient is not nervous/anxious  Current Medications: Reviewed  Allergies: Reviewed  PMH/FH/SH:  Reviewed      Physical Exam:    Body surface area is 2 06 meters squared      Wt Readings from Last 3 Encounters:   06/28/22 86 2 kg (190 lb)   06/13/22 86 4 kg (190 lb 7 6 oz)   05/31/22 86 6 kg (191 lb)        Temp Readings from Last 3 Encounters:   06/28/22 97 5 °F (36 4 °C) (Temporal)   06/13/22 97 7 °F (36 5 °C) (Temporal)   05/04/22 (!) 97 3 °F (36 3 °C)        BP Readings from Last 3 Encounters:   06/28/22 142/86   06/13/22 154/81   05/31/22 140/80         Pulse Readings from Last 3 Encounters:   06/28/22 73   06/13/22 69   05/31/22 71 Physical Exam  Constitutional:       Appearance: Normal appearance  HENT:      Head: Normocephalic and atraumatic  Musculoskeletal:      Cervical back: Normal range of motion and neck supple  Neurological:      General: No focal deficit present  Mental Status: He is alert and oriented to person, place, and time  Mental status is at baseline  Psychiatric:         Mood and Affect: Mood normal          Behavior: Behavior normal          Thought Content: Thought content normal          Judgment: Judgment normal          ECO  Goals and Barriers:  Current Goal: Minimize effects of disease  Barriers: None  Patient's Capacity to Self Care:  Patient is able to self care      Code Status: [unfilled]

## 2022-06-30 ENCOUNTER — OFFICE VISIT (OUTPATIENT)
Dept: UROLOGY | Facility: AMBULATORY SURGERY CENTER | Age: 67
End: 2022-06-30
Payer: COMMERCIAL

## 2022-06-30 VITALS
WEIGHT: 189 LBS | DIASTOLIC BLOOD PRESSURE: 92 MMHG | SYSTOLIC BLOOD PRESSURE: 148 MMHG | HEART RATE: 72 BPM | OXYGEN SATURATION: 98 % | BODY MASS INDEX: 26.36 KG/M2

## 2022-06-30 DIAGNOSIS — C61 PROSTATE CANCER (HCC): Primary | ICD-10-CM

## 2022-06-30 PROCEDURE — 1036F TOBACCO NON-USER: CPT | Performed by: UROLOGY

## 2022-06-30 PROCEDURE — 99214 OFFICE O/P EST MOD 30 MIN: CPT | Performed by: UROLOGY

## 2022-06-30 NOTE — PROGRESS NOTES
6/30/2022    Yazmin Munoz  1955  [id number]        Assessment  History of small volume [place] 6 prostate cancer on active surveillance, 111 g prostate, biopsy of right iliac lymphadenopathy consistent with a reactive lymph node, history of smoldering multiple myeloma      Discussion  I had a lengthy discussion with the knot and his wife in the office today regarding his biopsy results which do not reveal evidence of lymphoma, multiple myeloma, or prostate cancer  Provided him with reassurance that his multiparametric MRI of the prostate, stable PSA, stable digital rectal examination support the benign nature of his disease and I recommend that we continue with active surveillance  He will follow closely with Dr Fan Chandler  I recommend within the next 6 months repeating a PSA as well as a neither multiparametric MRI of the prostate which will survey not only the prostate but also the pelvic and inguinal adenopathy which was identified on the prior PSMA scan  I will coordinate this plan with Dr Fan Chandler  The patient is amenable  Again he understands that on active surveillance there is always risk of disease progression  History of Present Illness  61 y o  male with a history of small volume [place] 6 prostate cancer as well as a history of smoldering multiple myeloma  His prostate measures 111 g based on multiparametric MRI of the prostate from April 2022  His PSA has remained low and stable over many years  His most recent PSA is 4 2  His initial [place] 6 prostate cancer was identified on a biopsy performed in 2012  He has been on active surveillance for the last decade without any significant change in his PSA  More recently for monitoring of his prostate cancer he had a multiparametric MRI followed by a PSMA PET scan which revealed slight uptake within the prostate and mild uptake in a iliac lymph node measuring 3 cm    He recently underwent a CT-guided needle biopsy in Interventional Radiology and returns in follow-up today  The biopsy is benign  There is no evidence of lymphoma, multiple myeloma, or prostate cancer on the biopsy  AUA Symptom Score      Review of Systems  Review of Systems   Constitutional: Negative  HENT: Negative  Eyes: Negative  Respiratory: Negative  Cardiovascular: Negative  Gastrointestinal: Negative  Endocrine: Negative  Genitourinary:        Per HPI   Musculoskeletal: Negative  Skin: Negative  Allergic/Immunologic: Negative  Neurological: Negative  Hematological: Negative  Psychiatric/Behavioral: Negative  Past Medical History  Past Medical History:   Diagnosis Date    Adenocarcinoma of prostate ([address] )     Last assessed 08/08/17    Chronic kidney disease     Colon polyp     Psoriasis        Past Social History  Past Surgical History:   Procedure Laterality Date    COLONOSCOPY      IR BIOPSY LYMPH NODE  6/13/2022    TX COLONOSCOPY FLX DX W/COLLJ SPEC WHEN PFRMD N/A 08/31/2018    Procedure: COLONOSCOPY;  Surgeon: Joceline Figueroa MD;  Location: [facility] GI LAB;   Service: Colorectal    PROSTATE BIOPSY  02/16/2018    TONSILLECTOMY         Past Family History  Family History   Problem Relation Age of Onset    Arthritis Mother     Hyperlipidemia Mother     Hypertension Mother     Diabetes Son        Past Social history  Social History     Socioeconomic History    Marital status: /Civil Union     Spouse name: Not on file    Number of children: Not on file    Years of education: Not on file    Highest education level: Not on file   Occupational History    Occupation:  retired IT and finance   Tobacco Use    Smoking status: Never Smoker    Smokeless tobacco: Never Used   Vaping Use    Vaping Use: Never used   Substance and Sexual Activity    Alcohol use: Yes     Comment: very rarely    Drug use: No    Sexual activity: Yes     Partners: Female   Other Topics Concern    Not on file   Social History Narrative  Not on file     Social Determinants of Health     Financial Resource Strain: Not on file   Food Insecurity: Not on file   Transportation Needs: Not on file   Physical Activity: Not on file   Stress: Not on file   Social Connections: Not on file   Intimate Partner Violence: Not on file   Housing Stability: Not on file       Current Medications  Current Outpatient Medications   Medication Sig Dispense Refill    cholecalciferol (VITAMIN D3) 1,000 units tablet Take 1 tablet (1,000 Units total) by mouth daily      Guselkumab 100 MG/ML SOSY Inject per derm q 8 weeks      lisinopril (ZESTRIL) 40 mg tablet TAKE 1 TABLET BY MOUTH EVERY DAY 90 tablet 1    lovastatin (MEVACOR) 40 MG tablet TAKE 1 TABLET BY MOUTH EVERY DAY 90 tablet 0    sildenafil (REVATIO) 20 mg tablet Take 1-5 tablets by mouth on empty stomach one hour before sex 30 tablet 3     No current facility-administered medications for this visit  Allergies  Allergies   Allergen Reactions    Amlodipine Hives    Penicillins Hives    Fenofibrate Itching and Rash    Iodinated Diagnostic Agents Rash       Past Medical History, Social History, Family History, medications and allergies were reviewed  Vitals  Vitals:    06/30/22 0741   BP: 148/92   BP Location: Left arm   Patient Position: Sitting   Cuff Size: Adult   Pulse: 72   SpO2: 98%   Weight: 85 7 kg (189 lb)       Physical Exam  Physical Exam  On examination he is in no acute distress    Gait normal   Affect normal    Results  Lab Results   Component Value Date    PSA 3 8 06/28/2022    PSA 4 2 (H) 04/04/2022    PSA 4 20 (H) 10/11/2021     Lab Results   Component Value Date    CALCIUM 9 8 06/28/2022     11/30/2017     11/30/2017     11/30/2017     11/30/2017    K 4 2 06/28/2022    CO2 24 06/28/2022     (H) 06/28/2022    BUN 22 06/28/2022    CREATININE 1 65 (H) 06/28/2022     Lab Results   Component Value Date    WBC 7 84 06/28/2022    HGB 14 9 06/28/2022    HCT 49 2 06/28/2022    MCV 86 06/28/2022     06/28/2022         Office Urine Dip  No results found for this or any previous visit (from the past 1 hour(s)) ]

## 2022-07-06 ENCOUNTER — TELEPHONE (OUTPATIENT)
Dept: NEPHROLOGY | Facility: CLINIC | Age: 67
End: 2022-07-06

## 2022-07-06 NOTE — TELEPHONE ENCOUNTER
Appointment Confirmation   Person confirmed appointment with  If not patient, name of the person Voice msg    Date and time of appointment 7/7  1:30    Patient acknowledged and will be at appointment? no    Did you advise the patient that they will need a urine sample if they are a new patient?  N/A    Did you advise the patient to bring their current medications for verification? (including any OTC) Yes    Additional Information

## 2022-07-07 ENCOUNTER — OFFICE VISIT (OUTPATIENT)
Dept: NEPHROLOGY | Facility: CLINIC | Age: 67
End: 2022-07-07
Payer: COMMERCIAL

## 2022-07-07 VITALS
HEIGHT: 71 IN | SYSTOLIC BLOOD PRESSURE: 152 MMHG | BODY MASS INDEX: 26.74 KG/M2 | DIASTOLIC BLOOD PRESSURE: 84 MMHG | WEIGHT: 191 LBS

## 2022-07-07 DIAGNOSIS — E83.52 HYPERCALCEMIA: ICD-10-CM

## 2022-07-07 DIAGNOSIS — I10 HYPERTENSION, UNSPECIFIED TYPE: ICD-10-CM

## 2022-07-07 DIAGNOSIS — R80.9 PROTEINURIA, UNSPECIFIED TYPE: ICD-10-CM

## 2022-07-07 DIAGNOSIS — N18.31 STAGE 3A CHRONIC KIDNEY DISEASE (HCC): Primary | ICD-10-CM

## 2022-07-07 DIAGNOSIS — D47.2 MGUS (MONOCLONAL GAMMOPATHY OF UNKNOWN SIGNIFICANCE): ICD-10-CM

## 2022-07-07 PROCEDURE — 99214 OFFICE O/P EST MOD 30 MIN: CPT | Performed by: PHYSICIAN ASSISTANT

## 2022-07-07 RX ORDER — NIFEDIPINE 30 MG/1
30 TABLET, EXTENDED RELEASE ORAL DAILY
Qty: 90 TABLET | Refills: 3 | Status: SHIPPED | OUTPATIENT
Start: 2022-07-07 | End: 2022-08-05

## 2022-07-07 NOTE — PATIENT INSTRUCTIONS
Start nifedipine 30mg daily   Check BP at home and call in 2 weeks with home readings   Avoid all NSAIDs (ibuprofen, Motrin, Aleve, Advil, Naproxen   )   Low sodium diet   Please let us know if you are scheduled for any studies with IV contrast (ex: CT scan, arteriogram or cardiac catheterization)   Follow up in 4 months with repeat labs prior

## 2022-07-07 NOTE — PROGRESS NOTES
OFFICE FOLLOW UP - Nephrology   Pal Melendez 61 y o  male MRN: [id number]       ASSESSMENT/PLAN:  1  CKD stage 3:  Baseline creatinine around 1 5-1 7  Recent creatinine stable at 1 65 with EGFR 43  2  Hypertension: BP elevated at home and in our office  Typically in the 130-150 range  Discussed the importance of good blood pressure control to help prevent worsening proteinuria/worsening kidney function  Goal blood pressure less than 130/80   3  Proteinuria:  Worsening on recent labs up to 1 35 g  Continue lisinopril 40 mg daily for now  4  Prostate cancer: Follows with Urology and currently on active surveillance  · Had recent biopsy of right iliac lymphadenopathy consistent with reactive lymph node, no malignancy noted  5  Hypercalcemia:  Elevated corrected calcium 10 4  Possibly related to monoclonal gammopathy versus prostate cancer  · Vitamin-D 37 2, PTH 73 9, free light chain ratio elevated at SPEP with monoclonal gammopathy identified as IgG kappa, free light chain ratio 3 73   6  Kappa light chain smoldering multiple myeloma: Follows with Hematology  Follow up in 4 months with Dr Bella Mcdonough:  Patient Instructions    Start nifedipine 30mg daily    Check BP at home and call in 2 weeks with home readings    Avoid all NSAIDs (ibuprofen, Motrin, Aleve, Advil, Naproxen   )    Low sodium diet    Please let us know if you are scheduled for any studies with IV contrast (ex: CT scan, arteriogram or cardiac catheterization)    Follow up in 4 months with repeat labs prior         HPI: Pal Melendez is a 61 y o  male who is here for CKD follow-up  Feeling very well recently  No recent chest pain, shortness of breath, nausea, vomiting or diarrhea  No lower extremity edema  Does have an upcoming bone marrow biopsy with Hematology/Oncology in about a month  No hematuria or foaming of the urine  ROS:   A complete review of systems was done   Pertinent positives and negatives as noted in the HPI, otherwise the review of systems is negative  Allergies: Amlodipine, Penicillins, Fenofibrate, and Iodinated diagnostic agents    Medications:   Current Outpatient Medications:     cholecalciferol (VITAMIN D3) 1,000 units tablet, Take 1 tablet (1,000 Units total) by mouth daily, Disp: , Rfl:     Guselkumab 100 MG/ML SOSY, Inject per derm q 8 weeks, Disp: , Rfl:     lisinopril (ZESTRIL) 40 mg tablet, TAKE 1 TABLET BY MOUTH EVERY DAY, Disp: 90 tablet, Rfl: 1    lovastatin (MEVACOR) 40 MG tablet, TAKE 1 TABLET BY MOUTH EVERY DAY, Disp: 90 tablet, Rfl: 0    NIFEdipine (PROCARDIA XL) 30 mg 24 hr tablet, Take 1 tablet (30 mg total) by mouth daily, Disp: 90 tablet, Rfl: 3    sildenafil (REVATIO) 20 mg tablet, Take 1-5 tablets by mouth on empty stomach one hour before sex (Patient not taking: Reported on 7/7/2022), Disp: 30 tablet, Rfl: 3    Past Medical History:   Diagnosis Date    Adenocarcinoma of prostate ([facility] [address] )     Last assessed 08/08/17    Chronic kidney disease     Colon polyp     Psoriasis      Past Surgical History:   Procedure Laterality Date    COLONOSCOPY      IR BIOPSY LYMPH NODE  6/13/2022    DC COLONOSCOPY FLX DX W/COLLJ SPEC WHEN PFRMD N/A 08/31/2018    Procedure: COLONOSCOPY;  Surgeon: Ruth Patel MD;  Location: [facility] GI LAB; Service: Colorectal    PROSTATE BIOPSY  02/16/2018    TONSILLECTOMY       Family History   Problem Relation Age of Onset    Arthritis Mother     Hyperlipidemia Mother     Hypertension Mother     Diabetes Son       reports that he has never smoked  He has never used smokeless tobacco  He reports current alcohol use  He reports that he does not use drugs  Physical Exam:   Vitals:    07/07/22 1328 07/07/22 1340   BP: 156/80 152/84   BP Location: Left arm    Patient Position: Sitting    Cuff Size: Standard    Weight: 86 6 kg (191 lb)    Height: 5' 11" (1 803 m)      Body mass index is 26 64 kg/m²      General:  appears comfortable and in no acute distress   Skin:  No rash, warm, good skin turgor   Eyes:  Sclerae anicteric, no periorbital edema   ENT:  Moist mucous membranes  Neck:  Trachea midline, symmetric   Chest:  Clear to auscultation bilaterally with no wheezes, rales or rhonchi  CVS:  Regular rate and rhythm  Abdomen:  Soft, nontender, nondistended  Neuro:  Awake and alert  Psych:  Appropriate affect  Extremities: no lower extremity edema     Lab Results:  Results for orders placed or performed in visit on 06/28/22   CBC and differential   Result Value Ref Range    WBC 7 84 4 31 - 10 16 Thousand/uL    RBC 5 72 (H) 3 88 - 5 62 Million/uL    Hemoglobin 14 9 12 0 - 17 0 g/dL    Hematocrit 49 2 36 5 - 49 3 %    MCV 86 82 - 98 fL    MCH 26 0 (L) 26 8 - 34 3 pg    MCHC 30 3 (L) 31 4 - 37 4 g/dL    RDW 16 0 (H) 11 6 - 15 1 %    MPV 10 8 8 9 - 12 7 fL    Platelets 732 053 - 900 Thousands/uL    nRBC 0 /100 WBCs    Neutrophils Relative 56 43 - 75 %    Immat GRANS % 0 0 - 2 %    Lymphocytes Relative 28 14 - 44 %    Monocytes Relative 12 4 - 12 %    Eosinophils Relative 4 0 - 6 %    Basophils Relative 0 0 - 1 %    Neutrophils Absolute 4 37 1 85 - 7 62 Thousands/µL    Immature Grans Absolute 0 03 0 00 - 0 20 Thousand/uL    Lymphocytes Absolute 2 17 0 60 - 4 47 Thousands/µL    Monocytes Absolute 0 94 0 17 - 1 22 Thousand/µL    Eosinophils Absolute 0 31 0 00 - 0 61 Thousand/µL    Basophils Absolute 0 02 0 00 - 0 10 Thousands/µL   Comprehensive metabolic panel   Result Value Ref Range    Sodium 141 136 - 145 mmol/L    Potassium 4 2 3 5 - 5 3 mmol/L    Chloride 111 (H) 100 - 108 mmol/L    CO2 24 21 - 32 mmol/L    ANION GAP 6 4 - 13 mmol/L    BUN 22 5 - 25 mg/dL    Creatinine 1 65 (H) 0 60 - 1 30 mg/dL    Glucose, Fasting 76 65 - 99 mg/dL    Calcium 9 8 8 3 - 10 1 mg/dL    Corrected Calcium 10 4 (H) 8 3 - 10 1 mg/dL    AST 17 5 - 45 U/L    ALT 20 12 - 78 U/L    Alkaline Phosphatase 57 46 - 116 U/L    Total Protein 7 3 6 4 - 8 2 g/dL    Albumin 3 2 (L) 3 5 - 5 0 g/dL Total Bilirubin 0 34 0 20 - 1 00 mg/dL    eGFR 43 ml/min/1 73sq m   Lipid Panel with Direct LDL reflex   Result Value Ref Range    Cholesterol 185 See Comment mg/dL    Triglycerides 199 (H) See Comment mg/dL    HDL, Direct 37 (L) >=40 mg/dL    LDL Calculated 108 (H) 0 - 100 mg/dL   TSH, 3rd generation   Result Value Ref Range    TSH 3RD GENERATON 1 910 0 450 - 4 500 uIU/mL   PSA, Total Screen   Result Value Ref Range    PSA 3 8 0 0 - 4 0 ng/mL   Phosphorus   Result Value Ref Range    Phosphorus 3 1 2 3 - 4 1 mg/dL   Protein / creatinine ratio, urine   Result Value Ref Range    Creatinine, Ur 101 0 mg/dL    Protein Urine Random 136 mg/dL    Prot/Creat Ratio, Ur 1 35 (H) 0 00 - 0 10   PTH, intact   Result Value Ref Range    PTH 73 9 18 4 - 80 1 pg/mL   Vitamin D 25 hydroxy   Result Value Ref Range    Vit D, 25-Hydroxy 37 2 30 0 - 100 0 ng/mL   Urinalysis with microscopic   Result Value Ref Range    Color, UA Light Yellow     Clarity, UA Clear     Specific [place], UA 1 012 1 003 - 1 030    pH, UA 6 0 4 5, 5 0, 5 5, 6 0, 6 5, 7 0, 7 5, 8 0    Leukocytes, UA Negative Negative    Nitrite, UA Negative Negative    Protein,  (2+) (A) Negative mg/dl    Glucose, UA Negative Negative mg/dl    Ketones, UA Negative Negative mg/dl    Urobilinogen, UA <2 0 <2 0 mg/dl mg/dl    Bilirubin, UA Negative Negative    Occult Blood, UA Small (A) Negative    RBC, UA None Seen None Seen, 1-2 /hpf    WBC, UA 1-2 None Seen, 1-2 /hpf    Epithelial Cells None Seen None Seen, Occasional /hpf    Bacteria, UA None Seen None Seen, Occasional /hpf             Invalid input(s): ALBUMIN      Portions of the record may have been created with voice recognition software  Occasional wrong word or "sound a like" substitutions may have occurred due to the inherent limitations of voice recognition software  Read the chart carefully and recognize, using context, where substitutions have occurred  If you have any questions, please contact the dictating provider

## 2022-07-08 ENCOUNTER — OFFICE VISIT (OUTPATIENT)
Dept: FAMILY MEDICINE CLINIC | Facility: CLINIC | Age: 67
End: 2022-07-08
Payer: COMMERCIAL

## 2022-07-08 VITALS
OXYGEN SATURATION: 97 % | SYSTOLIC BLOOD PRESSURE: 134 MMHG | BODY MASS INDEX: 26.9 KG/M2 | TEMPERATURE: 96.5 F | WEIGHT: 192.13 LBS | HEART RATE: 56 BPM | DIASTOLIC BLOOD PRESSURE: 82 MMHG | HEIGHT: 71 IN

## 2022-07-08 DIAGNOSIS — I10 ESSENTIAL HYPERTENSION: Primary | ICD-10-CM

## 2022-07-08 DIAGNOSIS — E78.2 MIXED HYPERLIPIDEMIA: ICD-10-CM

## 2022-07-08 DIAGNOSIS — D47.2 SMOLDERING MULTIPLE MYELOMA: ICD-10-CM

## 2022-07-08 DIAGNOSIS — C61 ADENOCARCINOMA OF PROSTATE (HCC): ICD-10-CM

## 2022-07-08 DIAGNOSIS — N18.31 STAGE 3A CHRONIC KIDNEY DISEASE (HCC): ICD-10-CM

## 2022-07-08 PROCEDURE — 3725F SCREEN DEPRESSION PERFORMED: CPT | Performed by: PHYSICIAN ASSISTANT

## 2022-07-08 PROCEDURE — 3079F DIAST BP 80-89 MM HG: CPT | Performed by: PHYSICIAN ASSISTANT

## 2022-07-08 PROCEDURE — 99214 OFFICE O/P EST MOD 30 MIN: CPT | Performed by: PHYSICIAN ASSISTANT

## 2022-07-08 PROCEDURE — 3075F SYST BP GE 130 - 139MM HG: CPT | Performed by: PHYSICIAN ASSISTANT

## 2022-07-08 NOTE — PROGRESS NOTES
Assessment and Plan:  Patient Instructions     Assessment/plan:   Benign essential hypertension-presently stable on lisinopril 40 mg daily and nifedipine 30 mg  No medication changes  Mixed hyperlipidemia -stable with lovastatin 40 mg daily, no medication changes  3   Prostate cancer -PSA 3 8  Continues to follow with Urology, Dr Elaine Emanuel  4   Stage 3a chronic kidney disease  -Stable on ACE-inhibitor therapy  Continues to follow with Nephrology  5   Smoldering multiple myeloma -stable per Dr Etienne Oakes  Problem List Items Addressed This Visit        Cardiovascular and Mediastinum    Essential hypertension - Primary    Relevant Orders    CBC and differential    Comprehensive metabolic panel    Lipid Panel with Direct LDL reflex    TSH, 3rd generation       Genitourinary    Adenocarcinoma of prostate ([facility] )    Relevant Orders    CBC and differential    Comprehensive metabolic panel    Lipid Panel with Direct LDL reflex    TSH, 3rd generation    Stage 2 chronic kidney disease       Other    Mixed hyperlipidemia    Relevant Orders    CBC and differential    Comprehensive metabolic panel    Lipid Panel with Direct LDL reflex    TSH, 3rd generation    Smoldering multiple myeloma                 Diagnoses and all orders for this visit:    Essential hypertension  -     CBC and differential; Future  -     Comprehensive metabolic panel; Future  -     Lipid Panel with Direct LDL reflex; Future  -     TSH, 3rd generation; Future    Adenocarcinoma of prostate ([facility] )  -     CBC and differential; Future  -     Comprehensive metabolic panel; Future  -     Lipid Panel with Direct LDL reflex; Future  -     TSH, 3rd generation; Future    Mixed hyperlipidemia  -     CBC and differential; Future  -     Comprehensive metabolic panel; Future  -     Lipid Panel with Direct LDL reflex; Future  -     TSH, 3rd generation;  Future    Stage 3a chronic kidney disease (HCC)  -     CBC and differential; Future  -     Comprehensive metabolic panel; Future  -     Lipid Panel with Direct LDL reflex; Future  -     TSH, 3rd generation; Future    Smoldering multiple myeloma            Subjective:      Patient ID: Manuel Mendoza is a 61 y o  male  CC:    Chief Complaint   Patient presents with    Follow-up     For chronic conditions  mgb       HPI:      HPI:  This is a 73-year-old gentleman that presents to the office for follow-up of recent blood work and chronic health conditions  Is a history of hypertension hyperlipidemia which has been stable with medication therapy  He also continues to follow with Dr Mohit Fox, urology for prostate cancer and nephrology for stage 3 chronic kidney disease  The following portions of the patient's history were reviewed and updated as appropriate: allergies, current medications, past family history, past medical history, past social history, past surgical history and problem list       Review of Systems   Constitutional: Negative for chills, fatigue and fever  HENT: Negative for congestion, ear pain and sinus pressure  Eyes: Negative for visual disturbance  Respiratory: Negative for cough, chest tightness and shortness of breath  Cardiovascular: Negative for chest pain and palpitations  Gastrointestinal: Negative for diarrhea, nausea and vomiting  Endocrine: Negative for polyuria  Genitourinary: Negative for dysuria and frequency  Musculoskeletal: Negative for arthralgias and myalgias  Skin: Negative for pallor and rash  Neurological: Negative for dizziness, weakness, light-headedness, numbness and headaches  Psychiatric/Behavioral: Negative for agitation, behavioral problems and sleep disturbance  All other systems reviewed and are negative          Data to review:       Objective:    Vitals:    07/08/22 1124   BP: 134/82   BP Location: Left arm   Patient Position: Sitting   Cuff Size: Large   Pulse: 56   Temp: (!) 96 5 °F (35 8 °C)   TempSrc: Temporal   SpO2: 97%   Weight: 87 1 kg (192 lb 2 oz)   Height: 5' 11" (1 803 m)        Physical Exam  Constitutional:       General: He is not in acute distress  Appearance: He is well-developed  HENT:      Head: Normocephalic and atraumatic  Right Ear: Tympanic membrane normal       Left Ear: Tympanic membrane normal    Eyes:      Conjunctiva/sclera: Conjunctivae normal    Cardiovascular:      Rate and Rhythm: Normal rate and regular rhythm  Pulmonary:      Effort: Pulmonary effort is normal    Abdominal:      General: Abdomen is flat  Bowel sounds are normal  There is no distension  Palpations: Abdomen is soft  There is no mass  Musculoskeletal:         General: Normal range of motion  Cervical back: Normal range of motion  Skin:     General: Skin is warm  Findings: No rash  Neurological:      Mental Status: He is alert and oriented to person, place, and time  Psychiatric:         Mood and Affect: Mood normal              Depression Screening and Follow-up Plan: Patient was screened for depression during today's encounter  They screened negative with a PHQ-2 score of 0

## 2022-07-25 ENCOUNTER — TELEPHONE (OUTPATIENT)
Dept: NEPHROLOGY | Facility: CLINIC | Age: 67
End: 2022-07-25

## 2022-07-25 NOTE — TELEPHONE ENCOUNTER
Patient called the office and advised that he was recently prescribed Nifedipine 30 mg daily,  he began to get headaches, pressure in his eyes and trouble sleeping  Patient stopped the medication after 3 days and symptoms subsided; he thought he would try it again but the symptoms started to occur  Patient would like to know if there is another alternative to this medication  - no longer taking nifedipine 30 mg daily  Call back is [phone]

## 2022-07-25 NOTE — TELEPHONE ENCOUNTER
Lm for the patient to call the office with his most recent blood pressures off his nifedipine    -Make sure he is still on lisinopril 40 mg daily , advised him to call back

## 2022-08-01 NOTE — TELEPHONE ENCOUNTER
Patient called with blood pressure readings:   07/25 /86  07/25 /85  07/26 /97  07/26 /86  07/27 /84  07/27 /81  07/28 /98  07/28 /90  07/29 /93  07/29 /87

## 2022-08-05 DIAGNOSIS — I10 HYPERTENSION: Primary | ICD-10-CM

## 2022-08-05 RX ORDER — HYDROCHLOROTHIAZIDE 12.5 MG/1
12.5 TABLET ORAL DAILY
Qty: 30 TABLET | Refills: 1 | Status: SHIPPED | OUTPATIENT
Start: 2022-08-05 | End: 2022-08-29

## 2022-08-05 NOTE — PROGRESS NOTES
Will d/c nifedipine as patient unable to tolerate medication  Patient's BP on average above goal  Recommend continuing lisinopril 40mg daily and will add HCTZ 12 5mg daily  He should have BMP in 1 week

## 2022-08-29 DIAGNOSIS — I10 HYPERTENSION: ICD-10-CM

## 2022-08-29 RX ORDER — HYDROCHLOROTHIAZIDE 12.5 MG/1
TABLET ORAL
Qty: 90 TABLET | Refills: 1 | Status: SHIPPED | OUTPATIENT
Start: 2022-08-29

## 2022-09-03 DIAGNOSIS — E78.2 MIXED HYPERLIPIDEMIA: ICD-10-CM

## 2022-09-06 RX ORDER — LOVASTATIN 40 MG/1
TABLET ORAL
Qty: 90 TABLET | Refills: 0 | Status: SHIPPED | OUTPATIENT
Start: 2022-09-06

## 2022-10-03 ENCOUNTER — APPOINTMENT (OUTPATIENT)
Dept: LAB | Facility: CLINIC | Age: 67
End: 2022-10-03
Payer: MEDICARE

## 2022-10-03 DIAGNOSIS — L40.0 PSORIASIS VULGARIS: ICD-10-CM

## 2022-10-03 DIAGNOSIS — Z79.899 ENCOUNTER FOR LONG-TERM (CURRENT) USE OF OTHER MEDICATIONS: ICD-10-CM

## 2022-10-03 LAB
ALBUMIN SERPL BCP-MCNC: 3.4 G/DL (ref 3.5–5)
ALP SERPL-CCNC: 59 U/L (ref 46–116)
ALT SERPL W P-5'-P-CCNC: 21 U/L (ref 12–78)
ANION GAP SERPL CALCULATED.3IONS-SCNC: 6 MMOL/L (ref 4–13)
AST SERPL W P-5'-P-CCNC: 21 U/L (ref 5–45)
BASOPHILS # BLD AUTO: 0.03 THOUSANDS/ΜL (ref 0–0.1)
BASOPHILS NFR BLD AUTO: 0 % (ref 0–1)
BILIRUB SERPL-MCNC: 0.46 MG/DL (ref 0.2–1)
BUN SERPL-MCNC: 31 MG/DL (ref 5–25)
CALCIUM ALBUM COR SERPL-MCNC: 10.5 MG/DL (ref 8.3–10.1)
CALCIUM SERPL-MCNC: 10 MG/DL (ref 8.3–10.1)
CHLORIDE SERPL-SCNC: 109 MMOL/L (ref 96–108)
CO2 SERPL-SCNC: 24 MMOL/L (ref 21–32)
CREAT SERPL-MCNC: 1.97 MG/DL (ref 0.6–1.3)
EOSINOPHIL # BLD AUTO: 0.29 THOUSAND/ΜL (ref 0–0.61)
EOSINOPHIL NFR BLD AUTO: 4 % (ref 0–6)
ERYTHROCYTE [DISTWIDTH] IN BLOOD BY AUTOMATED COUNT: 15.7 % (ref 11.6–15.1)
GFR SERPL CREATININE-BSD FRML MDRD: 34 ML/MIN/1.73SQ M
GLUCOSE P FAST SERPL-MCNC: 87 MG/DL (ref 65–99)
HCT VFR BLD AUTO: 47 % (ref 36.5–49.3)
HGB BLD-MCNC: 14.3 G/DL (ref 12–17)
IMM GRANULOCYTES # BLD AUTO: 0.02 THOUSAND/UL (ref 0–0.2)
IMM GRANULOCYTES NFR BLD AUTO: 0 % (ref 0–2)
LYMPHOCYTES # BLD AUTO: 1.91 THOUSANDS/ΜL (ref 0.6–4.47)
LYMPHOCYTES NFR BLD AUTO: 23 % (ref 14–44)
MCH RBC QN AUTO: 25.7 PG (ref 26.8–34.3)
MCHC RBC AUTO-ENTMCNC: 30.4 G/DL (ref 31.4–37.4)
MCV RBC AUTO: 85 FL (ref 82–98)
MONOCYTES # BLD AUTO: 1.12 THOUSAND/ΜL (ref 0.17–1.22)
MONOCYTES NFR BLD AUTO: 14 % (ref 4–12)
NEUTROPHILS # BLD AUTO: 4.8 THOUSANDS/ΜL (ref 1.85–7.62)
NEUTS SEG NFR BLD AUTO: 59 % (ref 43–75)
NRBC BLD AUTO-RTO: 0 /100 WBCS
PLATELET # BLD AUTO: 159 THOUSANDS/UL (ref 149–390)
PMV BLD AUTO: 10.7 FL (ref 8.9–12.7)
POTASSIUM SERPL-SCNC: 4.3 MMOL/L (ref 3.5–5.3)
PROT SERPL-MCNC: 7.7 G/DL (ref 6.4–8.4)
RBC # BLD AUTO: 5.56 MILLION/UL (ref 3.88–5.62)
SODIUM SERPL-SCNC: 139 MMOL/L (ref 135–147)
WBC # BLD AUTO: 8.17 THOUSAND/UL (ref 4.31–10.16)

## 2022-10-03 PROCEDURE — 80053 COMPREHEN METABOLIC PANEL: CPT

## 2022-10-03 PROCEDURE — 36415 COLL VENOUS BLD VENIPUNCTURE: CPT

## 2022-10-03 PROCEDURE — 85025 COMPLETE CBC W/AUTO DIFF WBC: CPT

## 2022-10-03 PROCEDURE — 86480 TB TEST CELL IMMUN MEASURE: CPT

## 2022-10-04 LAB
GAMMA INTERFERON BACKGROUND BLD IA-ACNC: 0.05 IU/ML
M TB IFN-G BLD-IMP: NEGATIVE
M TB IFN-G CD4+ BCKGRND COR BLD-ACNC: -0.01 IU/ML
M TB IFN-G CD4+ BCKGRND COR BLD-ACNC: 0 IU/ML
MITOGEN IGNF BCKGRD COR BLD-ACNC: 8.32 IU/ML

## 2022-10-24 ENCOUNTER — APPOINTMENT (OUTPATIENT)
Dept: LAB | Facility: CLINIC | Age: 67
End: 2022-10-24
Payer: MEDICARE

## 2022-10-24 DIAGNOSIS — N18.31 STAGE 3A CHRONIC KIDNEY DISEASE (HCC): ICD-10-CM

## 2022-10-24 DIAGNOSIS — D47.2 SMOLDERING MULTIPLE MYELOMA: ICD-10-CM

## 2022-10-24 LAB
ALBUMIN SERPL BCP-MCNC: 3.5 G/DL (ref 3.5–5)
ALP SERPL-CCNC: 55 U/L (ref 46–116)
ALT SERPL W P-5'-P-CCNC: 20 U/L (ref 12–78)
ANION GAP SERPL CALCULATED.3IONS-SCNC: 3 MMOL/L (ref 4–13)
AST SERPL W P-5'-P-CCNC: 16 U/L (ref 5–45)
BACTERIA UR QL AUTO: NORMAL /HPF
BASOPHILS # BLD AUTO: 0.05 THOUSANDS/ÂΜL (ref 0–0.1)
BASOPHILS NFR BLD AUTO: 1 % (ref 0–1)
BILIRUB SERPL-MCNC: 0.49 MG/DL (ref 0.2–1)
BILIRUB UR QL STRIP: NEGATIVE
BUN SERPL-MCNC: 31 MG/DL (ref 5–25)
CALCIUM SERPL-MCNC: 10 MG/DL (ref 8.3–10.1)
CHLORIDE SERPL-SCNC: 108 MMOL/L (ref 96–108)
CLARITY UR: CLEAR
CO2 SERPL-SCNC: 26 MMOL/L (ref 21–32)
COLOR UR: ABNORMAL
CREAT SERPL-MCNC: 2.05 MG/DL (ref 0.6–1.3)
CREAT UR-MCNC: 93 MG/DL
EOSINOPHIL # BLD AUTO: 0.3 THOUSAND/ÂΜL (ref 0–0.61)
EOSINOPHIL NFR BLD AUTO: 4 % (ref 0–6)
ERYTHROCYTE [DISTWIDTH] IN BLOOD BY AUTOMATED COUNT: 15.6 % (ref 11.6–15.1)
GFR SERPL CREATININE-BSD FRML MDRD: 33 ML/MIN/1.73SQ M
GLUCOSE P FAST SERPL-MCNC: 93 MG/DL (ref 65–99)
GLUCOSE UR STRIP-MCNC: NEGATIVE MG/DL
HCT VFR BLD AUTO: 47.9 % (ref 36.5–49.3)
HGB BLD-MCNC: 14.4 G/DL (ref 12–17)
HGB UR QL STRIP.AUTO: ABNORMAL
IGA SERPL-MCNC: 170 MG/DL (ref 70–400)
IGG SERPL-MCNC: 1440 MG/DL (ref 700–1600)
IGM SERPL-MCNC: 54 MG/DL (ref 40–230)
IMM GRANULOCYTES # BLD AUTO: 0.03 THOUSAND/UL (ref 0–0.2)
IMM GRANULOCYTES NFR BLD AUTO: 0 % (ref 0–2)
KETONES UR STRIP-MCNC: NEGATIVE MG/DL
LEUKOCYTE ESTERASE UR QL STRIP: NEGATIVE
LYMPHOCYTES # BLD AUTO: 1.97 THOUSANDS/ÂΜL (ref 0.6–4.47)
LYMPHOCYTES NFR BLD AUTO: 23 % (ref 14–44)
MCH RBC QN AUTO: 25.5 PG (ref 26.8–34.3)
MCHC RBC AUTO-ENTMCNC: 30.1 G/DL (ref 31.4–37.4)
MCV RBC AUTO: 85 FL (ref 82–98)
MONOCYTES # BLD AUTO: 1.11 THOUSAND/ÂΜL (ref 0.17–1.22)
MONOCYTES NFR BLD AUTO: 13 % (ref 4–12)
NEUTROPHILS # BLD AUTO: 5.01 THOUSANDS/ÂΜL (ref 1.85–7.62)
NEUTS SEG NFR BLD AUTO: 59 % (ref 43–75)
NITRITE UR QL STRIP: NEGATIVE
NON-SQ EPI CELLS URNS QL MICRO: NORMAL /HPF
NRBC BLD AUTO-RTO: 0 /100 WBCS
PH UR STRIP.AUTO: 6 [PH]
PHOSPHATE SERPL-MCNC: 3.2 MG/DL (ref 2.3–4.1)
PLATELET # BLD AUTO: 181 THOUSANDS/UL (ref 149–390)
PMV BLD AUTO: 11 FL (ref 8.9–12.7)
POTASSIUM SERPL-SCNC: 4.3 MMOL/L (ref 3.5–5.3)
PROT SERPL-MCNC: 7.6 G/DL (ref 6.4–8.4)
PROT UR STRIP-MCNC: ABNORMAL MG/DL
PROT UR-MCNC: 72 MG/DL
PROT/CREAT UR: 0.77 MG/G{CREAT} (ref 0–0.1)
PTH-INTACT SERPL-MCNC: 100.8 PG/ML (ref 18.4–80.1)
RBC # BLD AUTO: 5.65 MILLION/UL (ref 3.88–5.62)
RBC #/AREA URNS AUTO: NORMAL /HPF
SODIUM SERPL-SCNC: 137 MMOL/L (ref 135–147)
SP GR UR STRIP.AUTO: 1.01 (ref 1–1.03)
UROBILINOGEN UR STRIP-ACNC: <2 MG/DL
WBC # BLD AUTO: 8.47 THOUSAND/UL (ref 4.31–10.16)
WBC #/AREA URNS AUTO: NORMAL /HPF

## 2022-10-24 PROCEDURE — 83521 IG LIGHT CHAINS FREE EACH: CPT

## 2022-10-24 PROCEDURE — 84100 ASSAY OF PHOSPHORUS: CPT

## 2022-10-24 PROCEDURE — 84165 PROTEIN E-PHORESIS SERUM: CPT

## 2022-10-24 PROCEDURE — 84156 ASSAY OF PROTEIN URINE: CPT

## 2022-10-24 PROCEDURE — 83970 ASSAY OF PARATHORMONE: CPT

## 2022-10-24 PROCEDURE — 82570 ASSAY OF URINE CREATININE: CPT

## 2022-10-24 PROCEDURE — 85025 COMPLETE CBC W/AUTO DIFF WBC: CPT

## 2022-10-24 PROCEDURE — 80053 COMPREHEN METABOLIC PANEL: CPT

## 2022-10-24 PROCEDURE — 82784 ASSAY IGA/IGD/IGG/IGM EACH: CPT

## 2022-10-24 PROCEDURE — 36415 COLL VENOUS BLD VENIPUNCTURE: CPT

## 2022-10-24 PROCEDURE — 81001 URINALYSIS AUTO W/SCOPE: CPT

## 2022-10-25 LAB
KAPPA LC FREE SER-MCNC: 90.8 MG/L (ref 3.3–19.4)
KAPPA LC FREE/LAMBDA FREE SER: 5.57 {RATIO} (ref 0.26–1.65)
LAMBDA LC FREE SERPL-MCNC: 16.3 MG/L (ref 5.7–26.3)

## 2022-10-26 LAB
ALBUMIN SERPL ELPH-MCNC: 3.93 G/DL (ref 3.5–5)
ALBUMIN SERPL ELPH-MCNC: 54.6 % (ref 52–65)
ALPHA1 GLOB SERPL ELPH-MCNC: 0.32 G/DL (ref 0.1–0.4)
ALPHA1 GLOB SERPL ELPH-MCNC: 4.4 % (ref 2.5–5)
ALPHA2 GLOB SERPL ELPH-MCNC: 0.76 G/DL (ref 0.4–1.2)
ALPHA2 GLOB SERPL ELPH-MCNC: 10.5 % (ref 7–13)
BETA GLOB ABNORMAL SERPL ELPH-MCNC: 0.4 G/DL (ref 0.4–0.8)
BETA1 GLOB SERPL ELPH-MCNC: 5.6 % (ref 5–13)
BETA2 GLOB SERPL ELPH-MCNC: 5 % (ref 2–8)
BETA2+GAMMA GLOB SERPL ELPH-MCNC: 0.36 G/DL (ref 0.2–0.5)
GAMMA GLOB ABNORMAL SERPL ELPH-MCNC: 1.43 G/DL (ref 0.5–1.6)
GAMMA GLOB SERPL ELPH-MCNC: 19.9 % (ref 12–22)
IGG/ALB SER: 1.2 {RATIO} (ref 1.1–1.8)
M PROTEIN 1 MFR SERPL ELPH: 8.7 %
M PROTEIN 1 SERPL ELPH-MCNC: 0.63 G/DL
PROT PATTERN SERPL ELPH-IMP: NORMAL
PROT SERPL-MCNC: 7.2 G/DL (ref 6.4–8.2)

## 2022-10-26 PROCEDURE — 84165 PROTEIN E-PHORESIS SERUM: CPT | Performed by: PATHOLOGY

## 2022-11-01 ENCOUNTER — OFFICE VISIT (OUTPATIENT)
Dept: HEMATOLOGY ONCOLOGY | Facility: CLINIC | Age: 67
End: 2022-11-01

## 2022-11-01 VITALS
HEART RATE: 85 BPM | RESPIRATION RATE: 16 BRPM | OXYGEN SATURATION: 96 % | TEMPERATURE: 97.7 F | DIASTOLIC BLOOD PRESSURE: 78 MMHG | SYSTOLIC BLOOD PRESSURE: 120 MMHG | WEIGHT: 186 LBS | BODY MASS INDEX: 26.04 KG/M2 | HEIGHT: 71 IN

## 2022-11-01 DIAGNOSIS — D47.2 SMOLDERING MULTIPLE MYELOMA: Primary | ICD-10-CM

## 2022-11-01 NOTE — PROGRESS NOTES
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 3 gram/deciliter, kappa light chain 65, lambda light chain 18 with a ratio of 3 58, creatinine 1 64, normal albumin, total protein, calcium, await for PET scan, this is most likely smoldering multiple myeloma, we talked about progression into edilson multiple myeloma 10% year however the patient has psoriatic arthritis and this might be the reason for elevation of kappa light chain and stimulation of the bone marrow plasma cells    M protein is rising slowly 0 63 g on 10/2022, IgG 1400 kappa light chain 90, lambda light chain 16 3 with a ratio of 5 5, PTH intact is elevated    This is a low risk smoldering multiple myeloma we will continue watchful observation and follow-up in 3 months with SPEP, free light chain, 24 hour urine UPEP, CBC, CMP and quantitative immunoglobulins          Labs and imaging studies are reviewed by ordering provider once results are available  If there are findings that need immediate attention, you will be contacted when results available  Discussing results and the implication on your healthcare is best discussed in person at your follow-up visit  HPI:    Tasha Kandy a 76 N  o  seen for initial consultation 12/5/2019 at the referral of Jessi Hammer DO regarding MGUS and elevated H/H        10/21/2019:  SPEP with immunofixation identified IgG kappa monoclonal band measuring 0 3 gram/deciliter     9/4/2019:  Normal quantitative immunoglobulins   Creatinine 1 61 otherwise normal CMP    Creatinine has been steadily been increasing slowly since 2016  [facility] follows with Dr Irma Roberts regarding his CKD     Hemoglobin 16 3, hematocrit 51 2, white blood cell count 8 2 with normal differential, platelet count 006     He feels well   Denies any fevers, chills, edi Abrams has been stable     He has been retired since 64years old  Ok [place]ier[place] frequently with his wife who is also retired      Does not smoke or drink      He has psoriasis followed by Dr [facility] [employer]        He follows with Dr Mynor Fountain volume [place] 6 prostate cancer diagnosed on an initial prostate biopsy in 2017  In 2018,a surveillance biopsy again showed 3 of 12 cores with Deanna 6 prostate cancer   He is on active surveillance     Workup for MGUS 12/2019 showed normal quantitative immunoglobulins, kappa light chain 14 7, lambda light chain 11 1, erythropoietin 5 4, negative for JAK2 mutation profile as well as MPL 1, CALR for polycythemia, hemoglobin electrophoresis normal       Component      Latest Ref Rng & Units 11/20/2017 12/16/2019   IMMUNOGLOBULIN KAPPA FREE LIGHT CHAIN      3 3 - 19 4 mg/L 23 8 (H) 14 7   IMMUNOGLOBULIN LAMBDA FREE LIGHT CHAIN      5 7 - 26 3 mg/L 18 8 11 1   KAPPA/LAMBDA FREE LIGHT CHAIN RATIO      0 26 - 1 65 1 27 1 32      Component      Latest Ref Rng & Units 7/13/2020 6/3/2021 3/7/2022   IMMUNOGLOBULIN KAPPA FREE LIGHT CHAIN      3 3 - 19 4 mg/L 29 6 (H) 41 5 (H) 65 1 (H)   IMMUNOGLOBULIN LAMBDA FREE LIGHT CHAIN      5 7 - 26 3 mg/L 16 5 21 8 18 2   KAPPA/LAMBDA FREE LIGHT CHAIN RATIO      0 26 - 1 65 1 79 (H) 1 90 (H) 3 58 (H)         Bone marrow biopsy on April 2022 showed 12-15% plasma cells, normal cytogenetics, fish panel for multiple myeloma is normal, molecular test however showed NF1 variant of unknown clinical significance     Prostate cancer [place] score of 6 by MRI of the prostate on 04/2022 involving 2 of 4 submitted cores no evidence of perineural invasion     PET scan PSA may showed active left iliac lymph node, biopsy was inconclusive for metastatic carcinoma or lymphoma    Interval History:        Previous Treatment:         Test Results:    Imaging: No results found      Labs:   Lab Results   Component Value Date    WBC 8 47 10/24/2022 HGB 14 4 10/24/2022    HCT 47 9 10/24/2022    MCV 85 10/24/2022     10/24/2022     Lab Results   Component Value Date     11/30/2017     11/30/2017     11/30/2017     11/30/2017    K 4 3 10/24/2022     10/24/2022    CO2 26 10/24/2022    BUN 31 (H) 10/24/2022    CREATININE 2 05 (H) 10/24/2022    GLUF 93 10/24/2022    CALCIUM 10 0 10/24/2022    CORRECTEDCA 10 5 (H) 10/03/2022    AST 16 10/24/2022    ALT 20 10/24/2022    ALKPHOS 55 10/24/2022    PROT 7 1 11/30/2017    PROT 7 1 11/30/2017    PROT 7 1 11/30/2017    PROT 7 1 11/30/2017    BILITOT 0 5 11/30/2017    BILITOT 0 5 11/30/2017    BILITOT 0 5 11/30/2017    BILITOT 0 5 11/30/2017    EGFR 33 10/24/2022       No results found for: IRON, TIBC, FERRITIN    Lab Results   Component Value Date    [id number] 422 08/08/2017         ROS: Review of Systems   Constitutional: Negative  Negative for appetite change, chills, diaphoresis, fatigue, fever and unexpected weight change  HENT:   Negative for hearing loss, lump/mass, mouth sores, nosebleeds, sore throat, trouble swallowing and voice change  Eyes: Negative  Negative for eye problems and icterus  Respiratory: Negative  Negative for chest tightness, cough, hemoptysis and shortness of breath  Cardiovascular: Negative for chest pain and leg swelling  Gastrointestinal: Negative for abdominal distention, abdominal pain, blood in stool, constipation, diarrhea and nausea  Endocrine: Negative  Genitourinary: Negative for dysuria, frequency, hematuria and pelvic pain  Musculoskeletal: Negative  Negative for arthralgias, back pain, flank pain, gait problem, myalgias and neck stiffness  Skin: Negative for itching and rash  Neurological: Negative for dizziness, gait problem, headaches, light-headedness, numbness and speech difficulty  Hematological: Negative for adenopathy  Does not bruise/bleed easily     Psychiatric/Behavioral: Negative for confusion, decreased concentration, depression and sleep disturbance  The patient is not nervous/anxious  Current Medications: Reviewed  Allergies: Reviewed  PMH/FH/SH:  Reviewed      Physical Exam:    Body surface area is 2 05 meters squared  Wt Readings from Last 3 Encounters:   11/01/22 84 4 kg (186 lb)   07/08/22 87 1 kg (192 lb 2 oz)   07/07/22 86 6 kg (191 lb)        Temp Readings from Last 3 Encounters:   11/01/22 97 7 °F (36 5 °C) (Temporal)   07/08/22 (!) 96 5 °F (35 8 °C) (Temporal)   06/28/22 97 5 °F (36 4 °C) (Temporal)        BP Readings from Last 3 Encounters:   11/01/22 120/78   07/08/22 134/82   07/07/22 152/84         Pulse Readings from Last 3 Encounters:   11/01/22 85   07/08/22 56   06/30/22 72        Physical Exam  Vitals reviewed  Constitutional:       General: He is not in acute distress  Appearance: He is well-developed  He is not diaphoretic  HENT:      Head: Normocephalic and atraumatic  Eyes:      Conjunctiva/sclera: Conjunctivae normal    Neck:      Trachea: No tracheal deviation  Cardiovascular:      Rate and Rhythm: Normal rate and regular rhythm  Heart sounds: No murmur heard  No friction rub  No gallop  Pulmonary:      Effort: Pulmonary effort is normal  No respiratory distress  Breath sounds: Normal breath sounds  No wheezing or rales  Chest:      Chest wall: No tenderness  Abdominal:      General: There is no distension  Palpations: Abdomen is soft  Tenderness: There is no abdominal tenderness  Musculoskeletal:      Cervical back: Normal range of motion and neck supple  Right lower leg: No edema  Left lower leg: No edema  Lymphadenopathy:      Cervical: No cervical adenopathy  Skin:     General: Skin is warm and dry  Coloration: Skin is not pale  Findings: No erythema  Neurological:      Mental Status: He is alert and oriented to person, place, and time     Psychiatric:         Behavior: Behavior normal          Thought Content: Thought content normal          Judgment: Judgment normal          ECO    Goals and Barriers:  Current Goal: Minimize effects of disease  Barriers: None  Patient's Capacity to Self Care:  Patient is able to self care      Code Status: @[facility]@

## 2022-11-04 ENCOUNTER — TELEPHONE (OUTPATIENT)
Dept: NEPHROLOGY | Facility: CLINIC | Age: 67
End: 2022-11-04

## 2022-11-04 NOTE — TELEPHONE ENCOUNTER
Appointment Confirmation   Person confirmed appointment with  If not patient, name of the person Patient     Date and time of appointment 11/7   Patient acknowledged and will be at appointment?  yes   Did you advise the patient that they will need a urine sample if they are a new patient? no   Did you advise the patient to bring their current medications for verification? (including any OTC) yes   Additional Information

## 2022-11-07 ENCOUNTER — OFFICE VISIT (OUTPATIENT)
Dept: NEPHROLOGY | Facility: CLINIC | Age: 67
End: 2022-11-07

## 2022-11-07 VITALS
HEIGHT: 71 IN | SYSTOLIC BLOOD PRESSURE: 124 MMHG | WEIGHT: 185.8 LBS | BODY MASS INDEX: 26.01 KG/M2 | DIASTOLIC BLOOD PRESSURE: 80 MMHG | HEART RATE: 74 BPM

## 2022-11-07 DIAGNOSIS — N18.32 STAGE 3B CHRONIC KIDNEY DISEASE (HCC): Primary | ICD-10-CM

## 2022-11-07 DIAGNOSIS — I10 ESSENTIAL HYPERTENSION: ICD-10-CM

## 2022-11-07 DIAGNOSIS — R80.9 PROTEINURIA, UNSPECIFIED TYPE: ICD-10-CM

## 2022-11-07 DIAGNOSIS — E83.52 HYPERCALCEMIA: ICD-10-CM

## 2022-11-07 DIAGNOSIS — R31.29 MICROSCOPIC HEMATURIA: ICD-10-CM

## 2022-11-07 NOTE — PATIENT INSTRUCTIONS
1  Chronic kidney disease stage 3b in setting of prostatic adenocarcinoma as well as smoldering myeloma   - eGFR ~33ml/min per CKD EPI equation  Last sCr 2 05 as of 10/24/22  This has worsened over the last year    -continue to avoid nonsteroidals(motrin, aleve, advil, ibuprofen, toradol, naproxen, celebrex, indomethacin and meloxicam)  -renal u/s August 2017 shows echogenic kidneys with b/l renal cysts  The cause of this is unclear   -last urinalysis shows 1+protein in urine, trace blood, quantified protein 0 77g  -Check CMP in 2 months as well as urine protein:Cr and microalb:Cr     2  HTN - BP well controlled  Have correlated home BP cuff in past which appears to be accurate  Continue low salt diet  -on lisinopril 40mg daily, HCTZ 12 5mg daily  No longer on amlodipine 5mg daily  -Goal BP < 130/80  Call office if BP at home persistently above goal    -monitor BP twice daily  Do not check BP immediately after waking up as it tends to run higher in everyone then  Must check BP after at least 30 minutes of rest  Call office with BP readings next week  BP log provided  3  Proteinuria - noted on UA  +1 protein on UA and UpCr 0 77g - has smoldering myeloma  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 D/t monoclonal gammopathy vs prostate ca - corrected calcium 10 4 as of 10/24/22  On cholecalciferol 1000 units daily  PTH elevated at 100 8  Last 25 hydroxy vitamin-D level 37 as of June 2022    7  Kappa light chain smoldering multiple myeloma - follows with hematology     RTC in 4 months  Repeat BMP, UA and UpCr in 2 months

## 2022-11-07 NOTE — PROGRESS NOTES
NEPHROLOGY OUTPATIENT PROGRESS NOTE   [facility] Sakina 74 y o  male MRN: [id number]  DATE: 11/7/2022  Reason for visit:   Chief Complaint   Patient presents with   • Chronic Kidney Disease   • Follow-up        Patient Instructions   1  Chronic kidney disease stage 3b in setting of prostatic adenocarcinoma as well as smoldering myeloma   - eGFR ~33ml/min per CKD EPI equation  Last sCr 2 05 as of 10/24/22  This has worsened over the last year    -continue to avoid nonsteroidals(motrin, aleve, advil, ibuprofen, toradol, naproxen, celebrex, indomethacin and meloxicam)  -renal u/s August 2017 shows echogenic kidneys with b/l renal cysts  The cause of this is unclear   -last urinalysis shows 1+protein in urine, trace blood, quantified protein 0 77g  -Check CMP in 2 months as well as urine protein:Cr and microalb:Cr     2  HTN - BP well controlled  Have correlated home BP cuff in past which appears to be accurate  Continue low salt diet  -on lisinopril 40mg daily, HCTZ 12 5mg daily  No longer on amlodipine 5mg daily  -Goal BP < 130/80  Call office if BP at home persistently above goal    -monitor BP twice daily  Do not check BP immediately after waking up as it tends to run higher in everyone then  Must check BP after at least 30 minutes of rest  Call office with BP readings next week  BP log provided  3  Proteinuria - noted on UA  +1 protein on UA and UpCr 0 77g - has smoldering myeloma  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 D/t monoclonal gammopathy vs prostate ca - corrected calcium 10 4 as of 10/24/22  On cholecalciferol 1000 units daily  PTH elevated at 100 8  Last 25 hydroxy vitamin-D level 37 as of June 2022    7   [place] light chain smoldering multiple myeloma - follows with hematology     RTC in 4 months  Repeat BMP, UA and UpCr in 2 months  Erendira Jacobs was seen today for chronic kidney disease and follow-up  Diagnoses and all orders for this visit:    Stage 3b chronic kidney disease ([address] )  -     Comprehensive metabolic panel; Future  -     Urinalysis with microscopic; Future  -     Protein / creatinine ratio, urine; Future  -     Microalbumin / creatinine urine ratio; Future  -     US kidney and bladder with pvr; Future    Essential hypertension    Microscopic hematuria  -     Urinalysis with microscopic; Future    Hypercalcemia  -     Comprehensive metabolic panel; Future    Proteinuria, unspecified type  -     Protein / creatinine ratio, urine; Future        Assessment/Plan:  1  Chronic kidney disease stage 3b in setting of prostatic adenocarcinoma as well as smoldering myeloma   - eGFR ~33ml/min per CKD EPI equation  Last sCr 2 05 as of 10/24/22  This has worsened over the last year    -continue to avoid nonsteroidals(motrin, aleve, advil, ibuprofen, toradol, naproxen, celebrex, indomethacin and meloxicam)  -renal u/s August 2017 shows echogenic kidneys with b/l renal cysts  The cause of this is unclear   -last urinalysis shows 1+protein in urine, trace blood, quantified protein 0 77g  -Check CMP in 2 months as well as urine protein:Cr and microalb:Cr     2  HTN - BP well controlled  Have correlated home BP cuff in past which appears to be accurate  Continue low salt diet  -on lisinopril 40mg daily, HCTZ 12 5mg daily  No longer on amlodipine 5mg daily  -Goal BP < 130/80  Call office if BP at home persistently above goal    -monitor BP twice daily  Do not check BP immediately after waking up as it tends to run higher in everyone then  Must check BP after at least 30 minutes of rest  Call office with BP readings next week   BP log provided       3  Proteinuria - noted on UA  +1 protein on UA and UpCr 0 77g - has smoldering myeloma  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 D/t monoclonal gammopathy vs prostate ca - corrected calcium 10 4 as of 10/24/22  On cholecalciferol 1000 units daily  PTH elevated at 100 8  Last 25 hydroxy vitamin-D level 37 as of June 2022    7  Kappa light chain smoldering multiple myeloma - follows with hematology     RTC in 4 months    Repeat BMP, UA and UpCr in 2 months  SUBJECTIVE / INTERVAL HISTORY:  74 y o  male presents in follow up of CKD  Quintiles denies any recent illness/hospitalizations/medication changes since last office visit  Denies NSAID use    HTN - BP at home has been controlled  Could not tolerate nifedipine  Review of Systems   Constitutional: Negative for chills and fever  HENT: Negative for sore throat  Eyes: Negative for visual disturbance  Respiratory: Negative for cough and shortness of breath  Cardiovascular: Negative for chest pain and leg swelling  Gastrointestinal: Negative for abdominal pain, constipation, diarrhea, nausea and vomiting  Endocrine: Negative for polyuria  Genitourinary: Negative for decreased urine volume, difficulty urinating, dysuria and hematuria  Musculoskeletal: Positive for myalgias  Negative for back pain  Skin: Negative for rash  Neurological: Negative for dizziness, light-headedness and numbness  Psychiatric/Behavioral: Negative for confusion  OBJECTIVE:  /80 (BP Location: Left arm, Patient Position: Sitting, Cuff Size: Standard)   Pulse 74   Ht 5' 11" (1 803 m)   Wt 84 3 kg (185 lb 12 8 oz)   BMI 25 91 kg/m²  Body mass index is 25 91 kg/m²  Physical exam:  Physical Exam  Vitals reviewed     Constitutional: General: He is not in acute distress  Appearance: Normal appearance  He is well-developed  He is not diaphoretic  HENT:      Head: Normocephalic and atraumatic  Nose: Nose normal       Mouth/Throat:      Mouth: Mucous membranes are moist       Pharynx: No oropharyngeal exudate  Eyes:      General: No scleral icterus  Right eye: No discharge  Left eye: No discharge  Comments: eyeglasses   Neck:      Thyroid: No thyromegaly  Cardiovascular:      Rate and Rhythm: Normal rate and regular rhythm  Heart sounds: Normal heart sounds  Pulmonary:      Effort: Pulmonary effort is normal       Breath sounds: Normal breath sounds  No wheezing or rales  Abdominal:      General: Bowel sounds are normal  There is no distension  Palpations: Abdomen is soft  Tenderness: There is no abdominal tenderness  Musculoskeletal:         General: No swelling  Normal range of motion  Cervical back: Neck supple  Lymphadenopathy:      Cervical: No cervical adenopathy  Skin:     General: Skin is warm and dry  Findings: No rash  Neurological:      General: No focal deficit present  Mental Status: He is alert  Comments: awake   Psychiatric:         Mood and Affect: Mood normal          Behavior: Behavior normal          Medications:    Current Outpatient Medications:   •  cholecalciferol (VITAMIN D3) 1,000 units tablet, Take 1 tablet (1,000 Units total) by mouth daily, Disp: , Rfl:   •  Guselkumab 100 MG/ML SOSY, Inject per derm q 8 weeks, Disp: , Rfl:   •  hydrochlorothiazide (HYDRODIURIL) 12 5 mg tablet, TAKE 1 TABLET BY MOUTH EVERY DAY, Disp: 90 tablet, Rfl: 1  •  lisinopril (ZESTRIL) 40 mg tablet, TAKE 1 TABLET BY MOUTH EVERY DAY, Disp: 90 tablet, Rfl: 1  •  lovastatin (MEVACOR) 40 MG tablet, TAKE 1 TABLET BY MOUTH EVERY DAY, Disp: 90 tablet, Rfl: 0    Allergies:   Allergies as of 11/07/2022 - Reviewed 11/07/2022   Allergen Reaction Noted   • Amlodipine Hives 07/23/2018   • Penicillins Hives 08/29/2017   • Fenofibrate Itching and Rash 07/10/2015   • Iodinated diagnostic agents Rash 06/06/2022       The following portions of the patient's history were reviewed and updated as appropriate: past family history, past surgical history and problem list     Laboratory Results:  Lab Results   Component Value Date    SODIUM 137 10/24/2022    K 4 3 10/24/2022     10/24/2022    CO2 26 10/24/2022    BUN 31 (H) 10/24/2022    CREATININE 2 05 (H) 10/24/2022    GLUC 88 06/13/2022    CALCIUM 10 0 10/24/2022        Lab Results   Component Value Date     8 (H) 10/24/2022    CALCIUM 10 0 10/24/2022    CAION 5 8 (H) 11/30/2017    CAION 5 8 (H) 11/30/2017    CAION 5 8 (H) 11/30/2017    CAION 5 8 (H) 11/30/2017    PHOS 3 2 10/24/2022       Portions of the record may have been created with voice recognition software   Occasional wrong word or "sound a like" substitutions may have occurred due to the inherent limitations of voice recognition software   Read the chart carefully and recognize, using context, where substitutions have occurred

## 2022-11-10 ENCOUNTER — HOSPITAL ENCOUNTER (OUTPATIENT)
Dept: ULTRASOUND IMAGING | Facility: MEDICAL CENTER | Age: 67
Discharge: HOME | End: 2022-11-10

## 2022-11-10 DIAGNOSIS — N18.32 STAGE 3B CHRONIC KIDNEY DISEASE (HCC): ICD-10-CM

## 2022-11-21 ENCOUNTER — APPOINTMENT (OUTPATIENT)
Dept: LAB | Facility: CLINIC | Age: 67
End: 2022-11-21

## 2022-11-21 DIAGNOSIS — R31.29 MICROSCOPIC HEMATURIA: ICD-10-CM

## 2022-11-21 DIAGNOSIS — E83.52 HYPERCALCEMIA: ICD-10-CM

## 2022-11-21 DIAGNOSIS — N18.32 STAGE 3B CHRONIC KIDNEY DISEASE (HCC): ICD-10-CM

## 2022-11-21 DIAGNOSIS — R80.9 PROTEINURIA, UNSPECIFIED TYPE: ICD-10-CM

## 2022-11-21 LAB
ALBUMIN SERPL BCP-MCNC: 3.4 G/DL (ref 3.5–5)
ALP SERPL-CCNC: 57 U/L (ref 46–116)
ALT SERPL W P-5'-P-CCNC: 20 U/L (ref 12–78)
ANION GAP SERPL CALCULATED.3IONS-SCNC: 3 MMOL/L (ref 4–13)
AST SERPL W P-5'-P-CCNC: 20 U/L (ref 5–45)
BACTERIA UR QL AUTO: ABNORMAL /HPF
BILIRUB SERPL-MCNC: 0.47 MG/DL (ref 0.2–1)
BILIRUB UR QL STRIP: NEGATIVE
BUN SERPL-MCNC: 30 MG/DL (ref 5–25)
CALCIUM ALBUM COR SERPL-MCNC: 10.9 MG/DL (ref 8.3–10.1)
CALCIUM SERPL-MCNC: 10.4 MG/DL (ref 8.3–10.1)
CHLORIDE SERPL-SCNC: 107 MMOL/L (ref 96–108)
CLARITY UR: CLEAR
CO2 SERPL-SCNC: 27 MMOL/L (ref 21–32)
COLOR UR: COLORLESS
CREAT SERPL-MCNC: 1.95 MG/DL (ref 0.6–1.3)
CREAT UR-MCNC: 86.5 MG/DL
CREAT UR-MCNC: 86.5 MG/DL
GFR SERPL CREATININE-BSD FRML MDRD: 35 ML/MIN/1.73SQ M
GLUCOSE P FAST SERPL-MCNC: 92 MG/DL (ref 65–99)
GLUCOSE UR STRIP-MCNC: NEGATIVE MG/DL
HGB UR QL STRIP.AUTO: NEGATIVE
KETONES UR STRIP-MCNC: NEGATIVE MG/DL
LEUKOCYTE ESTERASE UR QL STRIP: NEGATIVE
MICROALBUMIN UR-MCNC: 326 MG/L (ref 0–20)
MICROALBUMIN/CREAT 24H UR: 377 MG/G CREATININE (ref 0–30)
NITRITE UR QL STRIP: NEGATIVE
NON-SQ EPI CELLS URNS QL MICRO: ABNORMAL /HPF
PH UR STRIP.AUTO: 6 [PH]
POTASSIUM SERPL-SCNC: 4.3 MMOL/L (ref 3.5–5.3)
PROT SERPL-MCNC: 7.7 G/DL (ref 6.4–8.4)
PROT UR STRIP-MCNC: ABNORMAL MG/DL
PROT UR-MCNC: 51 MG/DL
PROT/CREAT UR: 0.59 MG/G{CREAT} (ref 0–0.1)
RBC #/AREA URNS AUTO: ABNORMAL /HPF
SODIUM SERPL-SCNC: 137 MMOL/L (ref 135–147)
SP GR UR STRIP.AUTO: 1.01 (ref 1–1.03)
UROBILINOGEN UR STRIP-ACNC: <2 MG/DL
WBC #/AREA URNS AUTO: ABNORMAL /HPF

## 2022-12-05 DIAGNOSIS — E78.2 MIXED HYPERLIPIDEMIA: ICD-10-CM

## 2022-12-05 RX ORDER — LOVASTATIN 40 MG/1
TABLET ORAL
Qty: 90 TABLET | Refills: 0 | Status: SHIPPED | OUTPATIENT
Start: 2022-12-05

## 2022-12-06 DIAGNOSIS — I10 HYPERTENSION, UNSPECIFIED TYPE: ICD-10-CM

## 2022-12-07 RX ORDER — LISINOPRIL 40 MG/1
TABLET ORAL
Qty: 90 TABLET | Refills: 1 | Status: SHIPPED | OUTPATIENT
Start: 2022-12-07

## 2022-12-12 ENCOUNTER — HOSPITAL ENCOUNTER (OUTPATIENT)
Dept: RADIOLOGY | Age: 67
Discharge: HOME | End: 2022-12-12

## 2022-12-12 DIAGNOSIS — C61 PROSTATE CANCER (HCC): ICD-10-CM

## 2022-12-12 RX ADMIN — GADOBUTROL 8 ML: 604.72 INJECTION INTRAVENOUS at 08:58

## 2022-12-19 ENCOUNTER — APPOINTMENT (OUTPATIENT)
Dept: LAB | Facility: CLINIC | Age: 67
End: 2022-12-19

## 2022-12-19 DIAGNOSIS — N18.31 STAGE 3A CHRONIC KIDNEY DISEASE (HCC): ICD-10-CM

## 2022-12-19 DIAGNOSIS — I10 ESSENTIAL HYPERTENSION: ICD-10-CM

## 2022-12-19 DIAGNOSIS — C61 PROSTATE CANCER (HCC): ICD-10-CM

## 2022-12-19 DIAGNOSIS — E78.2 MIXED HYPERLIPIDEMIA: ICD-10-CM

## 2022-12-19 DIAGNOSIS — C61 ADENOCARCINOMA OF PROSTATE (HCC): ICD-10-CM

## 2022-12-19 DIAGNOSIS — I10 HYPERTENSION: ICD-10-CM

## 2022-12-19 LAB
ALBUMIN SERPL BCP-MCNC: 3.6 G/DL (ref 3.5–5)
ALP SERPL-CCNC: 57 U/L (ref 46–116)
ALT SERPL W P-5'-P-CCNC: 20 U/L (ref 12–78)
ANION GAP SERPL CALCULATED.3IONS-SCNC: 7 MMOL/L (ref 4–13)
AST SERPL W P-5'-P-CCNC: 19 U/L (ref 5–45)
BASOPHILS # BLD AUTO: 0.04 THOUSANDS/ÂΜL (ref 0–0.1)
BASOPHILS NFR BLD AUTO: 1 % (ref 0–1)
BILIRUB SERPL-MCNC: 0.41 MG/DL (ref 0.2–1)
BUN SERPL-MCNC: 44 MG/DL (ref 5–25)
CALCIUM SERPL-MCNC: 10.2 MG/DL (ref 8.3–10.1)
CHLORIDE SERPL-SCNC: 107 MMOL/L (ref 96–108)
CHOLEST SERPL-MCNC: 191 MG/DL
CO2 SERPL-SCNC: 25 MMOL/L (ref 21–32)
CREAT SERPL-MCNC: 1.99 MG/DL (ref 0.6–1.3)
EOSINOPHIL # BLD AUTO: 0.32 THOUSAND/ÂΜL (ref 0–0.61)
EOSINOPHIL NFR BLD AUTO: 4 % (ref 0–6)
ERYTHROCYTE [DISTWIDTH] IN BLOOD BY AUTOMATED COUNT: 15.8 % (ref 11.6–15.1)
GFR SERPL CREATININE-BSD FRML MDRD: 34 ML/MIN/1.73SQ M
GLUCOSE P FAST SERPL-MCNC: 81 MG/DL (ref 65–99)
HCT VFR BLD AUTO: 49.8 % (ref 36.5–49.3)
HDLC SERPL-MCNC: 38 MG/DL
HGB BLD-MCNC: 14.2 G/DL (ref 12–17)
IMM GRANULOCYTES # BLD AUTO: 0.02 THOUSAND/UL (ref 0–0.2)
IMM GRANULOCYTES NFR BLD AUTO: 0 % (ref 0–2)
LDLC SERPL CALC-MCNC: 107 MG/DL (ref 0–100)
LYMPHOCYTES # BLD AUTO: 1.77 THOUSANDS/ÂΜL (ref 0.6–4.47)
LYMPHOCYTES NFR BLD AUTO: 22 % (ref 14–44)
MCH RBC QN AUTO: 25 PG (ref 26.8–34.3)
MCHC RBC AUTO-ENTMCNC: 28.5 G/DL (ref 31.4–37.4)
MCV RBC AUTO: 88 FL (ref 82–98)
MONOCYTES # BLD AUTO: 1.24 THOUSAND/ÂΜL (ref 0.17–1.22)
MONOCYTES NFR BLD AUTO: 15 % (ref 4–12)
NEUTROPHILS # BLD AUTO: 4.66 THOUSANDS/ÂΜL (ref 1.85–7.62)
NEUTS SEG NFR BLD AUTO: 58 % (ref 43–75)
NRBC BLD AUTO-RTO: 0 /100 WBCS
PLATELET # BLD AUTO: 174 THOUSANDS/UL (ref 149–390)
PMV BLD AUTO: 11.1 FL (ref 8.9–12.7)
POTASSIUM SERPL-SCNC: 4.3 MMOL/L (ref 3.5–5.3)
PROT SERPL-MCNC: 7.7 G/DL (ref 6.4–8.4)
RBC # BLD AUTO: 5.69 MILLION/UL (ref 3.88–5.62)
SODIUM SERPL-SCNC: 139 MMOL/L (ref 135–147)
TRIGL SERPL-MCNC: 229 MG/DL
TSH SERPL DL<=0.05 MIU/L-ACNC: 2.19 UIU/ML (ref 0.45–4.5)
WBC # BLD AUTO: 8.05 THOUSAND/UL (ref 4.31–10.16)

## 2022-12-21 LAB — PSA SERPL-MCNC: 3.9 NG/ML (ref 0–4)

## 2022-12-28 ENCOUNTER — OFFICE VISIT (OUTPATIENT)
Dept: UROLOGY | Facility: AMBULATORY SURGERY CENTER | Age: 67
End: 2022-12-28

## 2022-12-28 VITALS
OXYGEN SATURATION: 99 % | DIASTOLIC BLOOD PRESSURE: 82 MMHG | BODY MASS INDEX: 26.48 KG/M2 | WEIGHT: 185 LBS | RESPIRATION RATE: 18 BRPM | HEART RATE: 79 BPM | HEIGHT: 70 IN | SYSTOLIC BLOOD PRESSURE: 124 MMHG

## 2022-12-28 DIAGNOSIS — C61 PROSTATE CANCER (HCC): Primary | ICD-10-CM

## 2022-12-28 RX ORDER — GUSELKUMAB 100 MG/ML
INJECTION SUBCUTANEOUS
COMMUNITY
Start: 2022-11-18

## 2022-12-28 NOTE — PROGRESS NOTES
12/28/2022    Balbina Gardner  1955  [id number]        Assessment  History small-volume [place] 6 prostate cancer on active surveillance, smoldering multiple myeloma, multi parametric MRI of the prostate with improving lymphadenopathy, stable PSA, chronic kidney disease      Discussion  We discussed his multi parametric MRI, his stable PSA, his [place] 6 disease and his benign lymph node biopsy  I recommend that he continue with active surveillance  Follow-up will be in 6 months with his next PSA and digital rectal examination  The patient is amenable with this plan and understands the risk of disease progression on active surveillance  He will continue to follow with medical oncology as well as nephrology  History of Present Illness  74 y o  male with a history of [place] 6 prostate cancer diagnosed in 2012  He has been on active surveillance since that time  He has not had any significant change in his PSA which is hovered around 4  As part of active surveillance he had a recent multi parametric MRI of the prostate which revealed iliac adenopathy  He subsequently had a PSMA CT PET scan which showed activity within a 3 cm iliac node  He underwent CT-guided needle biopsy which revealed a reactive lymph node  He has a history of smoldering multiple myeloma although there was no evidence of lymphoma or myeloma or prostate cancer on the biopsy  He returns for routine follow-up today  His most recent PSA is stable and unchanged at 3 9  He also had a repeat multi parametric MRI of the prostate which shows slight interval decrease in size of the iliac adenopathy  Despite his 100 g prostate he has virtually no lower urinary tract symptoms hematuria, or nocturia  AUA Symptom Score      Review of Systems  Review of Systems   Constitutional: Negative  HENT: Negative  Eyes: Negative  Respiratory: Negative  Cardiovascular: Negative  Gastrointestinal: Negative      Endocrine: Negative  Genitourinary:        Per HPI   Musculoskeletal: Negative  Skin: Negative  Allergic/Immunologic: Negative  Neurological: Negative  Hematological: Negative  Psychiatric/Behavioral: Negative  Past Medical History  Past Medical History:   Diagnosis Date   • Adenocarcinoma of prostate ([facility] [address] )     Last assessed 08/08/17   • Chronic kidney disease    • Colon polyp    • Psoriasis        Past Social History  Past Surgical History:   Procedure Laterality Date   • COLONOSCOPY     • IR BIOPSY LYMPH NODE  6/13/2022   • [place] COLONOSCOPY FLX DX W/COLLJ SPEC WHEN PFRMD N/A 08/31/2018    Procedure: COLONOSCOPY;  Surgeon: Nupur Lovett MD;  Location: [address] [place] [address] GI LAB;   Service: Colorectal   • PROSTATE BIOPSY  02/16/2018   • TONSILLECTOMY         Past Family History  Family History   Problem Relation Age of Onset   • Arthritis Mother    • Hyperlipidemia Mother    • Hypertension Mother    • Diabetes Son        Past Social history  Social History     Socioeconomic History   • Marital status: /Civil Union     Spouse name: Not on file   • Number of children: Not on file   • Years of education: Not on file   • Highest education level: Not on file   Occupational History   • Occupation:  retired IT and finance   Tobacco Use   • Smoking status: Never   • Smokeless tobacco: Never   Vaping Use   • Vaping Use: Never used   Substance and Sexual Activity   • Alcohol use: Yes     Comment: very rarely   • Drug use: No   • Sexual activity: Yes     Partners: Female   Other Topics Concern   • Not on file   Social History Narrative   • Not on file     Social Determinants of Health     Financial Resource Strain: Not on file   Food Insecurity: Not on file   Transportation Needs: Not on file   Physical Activity: Not on file   Stress: Not on file   Social Connections: Not on file   Intimate Partner Violence: Not on file   Housing Stability: Not on file       Current Medications  Current Outpatient Medications   Medication Sig Dispense Refill   • cholecalciferol (VITAMIN D3) 1,000 units tablet Take 1 tablet (1,000 Units total) by mouth daily     • Guselkumab 100 MG/ML SOSY Inject per derm q 8 weeks     • hydrochlorothiazide (HYDRODIURIL) 12 5 mg tablet TAKE 1 TABLET BY MOUTH EVERY DAY 90 tablet 1   • lisinopril (ZESTRIL) 40 mg tablet TAKE 1 TABLET BY MOUTH EVERY DAY 90 tablet 1   • lovastatin (MEVACOR) 40 MG tablet TAKE 1 TABLET BY MOUTH EVERY DAY 90 tablet 0   • Tremfya subcutaneous injection        No current facility-administered medications for this visit  Allergies  Allergies   Allergen Reactions   • Amlodipine Hives   • Penicillins Hives   • Fenofibrate Itching and Rash   • Iodinated Contrast Media Rash       Past Medical History, Social History, Family History, medications and allergies were reviewed  Vitals  Vitals:    12/28/22 0750   BP: 124/82   BP Location: Right arm   Patient Position: Sitting   Cuff Size: Standard   Pulse: 79   Resp: 18   SpO2: 99%   Weight: 83 9 kg (185 lb)   Height: 5' 10" (1 778 m)       Physical Exam  Physical Exam  On examination he is in no acute distress    Gait normal   Affect normal      Results  Lab Results   Component Value Date    PSA 3 9 12/19/2022    PSA 3 8 06/28/2022    PSA 4 2 (H) 04/04/2022     Lab Results   Component Value Date    CALCIUM 10 2 (H) 12/19/2022     11/30/2017     11/30/2017     11/30/2017     11/30/2017    K 4 3 12/19/2022    CO2 25 12/19/2022     12/19/2022    BUN 44 (H) 12/19/2022    CREATININE 1 99 (H) 12/19/2022     Lab Results   Component Value Date    WBC 8 05 12/19/2022    HGB 14 2 12/19/2022    HCT 49 8 (H) 12/19/2022    MCV 88 12/19/2022     12/19/2022         Office Urine Dip  No results found for this or any previous visit (from the past 1 hour(s)) ]

## 2022-12-28 NOTE — LETTER
December 28, 2022     Reagan Hannah PA-C  [address]  [address] [facility]  [place]   [address]  [phone]    Patient: Raz Herzog   YOB: 1955   Date of Visit: 12/28/2022       Dear Dr Noel Yeh: Thank you for referring Raz Herzog to me for evaluation  Below are my notes for this consultation  If you have questions, please do not hesitate to call me  I look forward to following your patient along with you  Sincerely,        Hannah Godwin MD        CC: MD Josephine Quezada DO Cecillia Pelton, MD  12/28/2022  8:14 AM  Sign when Signing Visit  12/28/2022    Raz Herzog  1955  [id number]        Assessment  History small-volume Deanna 6 prostate cancer on active surveillance, smoldering multiple myeloma, multi parametric MRI of the prostate with improving lymphadenopathy, stable PSA      Discussion  We discussed his multi parametric MRI, his stable PSA, his Deanna 6 disease and his benign lymph node biopsy  I recommend that he continue with active surveillance  Follow-up will be in 6 months with his next PSA and digital rectal examination  The patient is amenable with this plan and understands the risk of disease progression on active surveillance  History of Present Illness  74 y o  male with a history of [place] 6 prostate cancer diagnosed in 2012  He has been on active surveillance since that time  He has not had any significant change in his PSA which is hovered around 4  As part of active surveillance he had a recent multi parametric MRI of the prostate which revealed iliac adenopathy  He subsequently had a PSMA CT PET scan which showed activity within a 3 cm iliac node  He underwent CT-guided needle biopsy which revealed a reactive lymph node  He has a history of smoldering multiple myeloma although there was no evidence of lymphoma or myeloma or prostate cancer on the biopsy  He returns for routine follow-up today    His most recent PSA is stable and unchanged at 3 9  He also had a repeat multi parametric MRI of the prostate which shows slight interval decrease in size of the iliac adenopathy  Despite his 100 g prostate he has virtually no lower urinary tract symptoms hematuria, or nocturia  AUA Symptom Score      Review of Systems  Review of Systems   Constitutional: Negative  HENT: Negative  Eyes: Negative  Respiratory: Negative  Cardiovascular: Negative  Gastrointestinal: Negative  Endocrine: Negative  Genitourinary:        Per HPI   Musculoskeletal: Negative  Skin: Negative  Allergic/Immunologic: Negative  Neurological: Negative  Hematological: Negative  Psychiatric/Behavioral: Negative  Past Medical History  Past Medical History:   Diagnosis Date   • Adenocarcinoma of prostate ([facility] [address] )     Last assessed 08/08/17   • Chronic kidney disease    • Colon polyp    • Psoriasis        Past Social History  Past Surgical History:   Procedure Laterality Date   • COLONOSCOPY     • IR BIOPSY LYMPH NODE  6/13/2022   • [place] COLONOSCOPY FLX DX W/COLLJ SPEC WHEN PFRMD N/A 08/31/2018    Procedure: COLONOSCOPY;  Surgeon: Amol Fernandez MD;  Location: [address] [place] [address] GI LAB;   Service: Colorectal   • PROSTATE BIOPSY  02/16/2018   • TONSILLECTOMY         Past Family History  Family History   Problem Relation Age of Onset   • Arthritis Mother    • Hyperlipidemia Mother    • Hypertension Mother    • Diabetes Son        Past Social history  Social History     Socioeconomic History   • Marital status: /Civil Union     Spouse name: Not on file   • Number of children: Not on file   • Years of education: Not on file   • Highest education level: Not on file   Occupational History   • Occupation:  retired IT and finance   Tobacco Use   • Smoking status: Never   • Smokeless tobacco: Never   Vaping Use   • Vaping Use: Never used   Substance and Sexual Activity   • Alcohol use: Yes     Comment: very rarely   • Drug use: No   • Sexual activity: Yes     Partners: Female   Other Topics Concern   • Not on file   Social History Narrative   • Not on file     Social Determinants of Health     Financial Resource Strain: Not on file   Food Insecurity: Not on file   Transportation Needs: Not on file   Physical Activity: Not on file   Stress: Not on file   Social Connections: Not on file   Intimate Partner Violence: Not on file   Housing Stability: Not on file       Current Medications  Current Outpatient Medications   Medication Sig Dispense Refill   • cholecalciferol (VITAMIN D3) 1,000 units tablet Take 1 tablet (1,000 Units total) by mouth daily     • Guselkumab 100 MG/ML SOSY Inject per derm q 8 weeks     • hydrochlorothiazide (HYDRODIURIL) 12 5 mg tablet TAKE 1 TABLET BY MOUTH EVERY DAY 90 tablet 1   • lisinopril (ZESTRIL) 40 mg tablet TAKE 1 TABLET BY MOUTH EVERY DAY 90 tablet 1   • lovastatin (MEVACOR) 40 MG tablet TAKE 1 TABLET BY MOUTH EVERY DAY 90 tablet 0   • Tremfya subcutaneous injection        No current facility-administered medications for this visit  Allergies  Allergies   Allergen Reactions   • Amlodipine Hives   • Penicillins Hives   • Fenofibrate Itching and Rash   • Iodinated Contrast Media Rash       Past Medical History, Social History, Family History, medications and allergies were reviewed  Vitals  Vitals:    12/28/22 0750   BP: 124/82   BP Location: Right arm   Patient Position: Sitting   Cuff Size: Standard   Pulse: 79   Resp: 18   SpO2: 99%   Weight: 83 9 kg (185 lb)   Height: 5' 10" (1 778 m)       Physical Exam  Physical Exam  On examination he is in no acute distress    Gait normal   Affect normal      Results  Lab Results   Component Value Date    PSA 3 9 12/19/2022    PSA 3 8 06/28/2022    PSA 4 2 (H) 04/04/2022     Lab Results   Component Value Date    CALCIUM 10 2 (H) 12/19/2022     11/30/2017     11/30/2017     11/30/2017     11/30/2017    K 4 3 12/19/2022    CO2 25 12/19/2022     12/19/2022    BUN 44 (H) 12/19/2022    CREATININE 1 99 (H) 12/19/2022     Lab Results   Component Value Date    WBC 8 05 12/19/2022    HGB 14 2 12/19/2022    HCT 49 8 (H) 12/19/2022    MCV 88 12/19/2022     12/19/2022         Office Urine Dip  No results found for this or any previous visit (from the past 1 hour(s)) ]

## 2023-01-11 ENCOUNTER — OFFICE VISIT (OUTPATIENT)
Dept: FAMILY MEDICINE CLINIC | Facility: CLINIC | Age: 68
End: 2023-01-11

## 2023-01-11 VITALS
WEIGHT: 188.4 LBS | DIASTOLIC BLOOD PRESSURE: 76 MMHG | RESPIRATION RATE: 16 BRPM | SYSTOLIC BLOOD PRESSURE: 124 MMHG | BODY MASS INDEX: 26.97 KG/M2 | HEART RATE: 68 BPM | HEIGHT: 70 IN

## 2023-01-11 DIAGNOSIS — C61 ADENOCARCINOMA OF PROSTATE (HCC): ICD-10-CM

## 2023-01-11 DIAGNOSIS — Z00.00 HEALTHCARE MAINTENANCE: ICD-10-CM

## 2023-01-11 DIAGNOSIS — E78.2 MIXED HYPERLIPIDEMIA: ICD-10-CM

## 2023-01-11 DIAGNOSIS — D47.2 SMOLDERING MULTIPLE MYELOMA: ICD-10-CM

## 2023-01-11 DIAGNOSIS — I10 ESSENTIAL HYPERTENSION: Primary | ICD-10-CM

## 2023-01-11 DIAGNOSIS — N18.32 STAGE 3B CHRONIC KIDNEY DISEASE (HCC): ICD-10-CM

## 2023-01-11 NOTE — PATIENT INSTRUCTIONS
Assessment/plan:  1  Benign essential hypertension-presently stable with lisinopril 40 mg and hydrochlorothiazide 12 5 mg daily  2   Mixed hyperlipidemia-stable with lovastatin 40 mg daily  3   Adenocarcinoma of the prostate-stable per urology  4   Smoldering multiple myeloma-stable per hematology/oncology  5   Healthcare maintenance-annual Medicare wellness visit  Prevnar 20 vaccination was discussed with patient but he would like to check with his dermatologist for since he has just taken Tremfya injection therapy  Shingles vaccine was also reviewed with patient  He will consider getting through vaccinating pharmacy on Medicare pharmacy benefit  He is up-to-date with tetanus vaccination  COVID booster also recommended  He is up-to-date with colon cancer surveillance  See Medicare wellness notes

## 2023-01-11 NOTE — PROGRESS NOTES
Assessment and Plan:     Patient Instructions   Assessment/plan:  1  Benign essential hypertension-presently stable with lisinopril 40 mg and hydrochlorothiazide 12 5 mg daily  2   Mixed hyperlipidemia-stable with lovastatin 40 mg daily  3   Adenocarcinoma of the prostate-stable per urology  4   Smoldering multiple myeloma-stable per hematology/oncology  5   Healthcare maintenance-annual Medicare wellness visit  Prevnar 20 vaccination was discussed with patient but he would like to check with his dermatologist for since he has just taken Tremfya injection therapy  Shingles vaccine was also reviewed with patient  He will consider getting through vaccinating pharmacy on Medicare pharmacy benefit  He is up-to-date with tetanus vaccination  COVID booster also recommended  He is up-to-date with colon cancer surveillance  See Medicare wellness notes  Problem List Items Addressed This Visit        Cardiovascular and Mediastinum    Essential hypertension - Primary    Relevant Orders    CBC and differential    Comprehensive metabolic panel    Lipid Panel with Direct LDL reflex    TSH, 3rd generation       Genitourinary    Adenocarcinoma of prostate ([facility] [address] )    Relevant Orders    CBC and differential    Comprehensive metabolic panel    Lipid Panel with Direct LDL reflex    TSH, 3rd generation    Stage 3b chronic kidney disease (HCC)       Other    Mixed hyperlipidemia    Relevant Orders    CBC and differential    Comprehensive metabolic panel    Lipid Panel with Direct LDL reflex    TSH, 3rd generation    Smoldering multiple myeloma   Other Visit Diagnoses     Healthcare maintenance               Preventive health issues were discussed with patient, and age appropriate screening tests were ordered as noted in patient's After Visit Summary  Personalized health advice and appropriate referrals for health education or preventive services given if needed, as noted in patient's After Visit Summary       History of Present Illness:     Patient presents for a Medicare Wellness Visit    HPI: This is a 29-year-old gentleman that presents to the office for initial Medicare wellness visit  He is also seen for follow-up to recent labs  He has a history of hyperlipidemia and hypertension  His cholesterol has been stable on lovastatin and he continues lisinopril with hydrochlorothiazide for his blood pressure  He does have history of prostate cancer and continues to follow with urology for active surveillance  He also has smoldering multiple myeloma and follows with hematology/oncology  Patient Care Team:  Kim De La Torre PA-C as PCP - General (Family Medicine)  MEDINA Love PA-C Laurian Collum, [place]  MD Levi Amezcua MD as Endoscopist  Joan Hussein MD as Medical Oncologist (Hematology)     Review of Systems:     Review of Systems   Constitutional: Negative for chills, fatigue and fever  HENT: Negative for congestion, ear pain and sinus pressure  Eyes: Negative for visual disturbance  Respiratory: Negative for cough, chest tightness and shortness of breath  Cardiovascular: Negative for chest pain and palpitations  Gastrointestinal: Negative for diarrhea, nausea and vomiting  Endocrine: Negative for polyuria  Genitourinary: Negative for dysuria and frequency  Musculoskeletal: Negative for arthralgias and myalgias  Skin: Negative for pallor and rash  Neurological: Negative for dizziness, weakness, light-headedness, numbness and headaches  Psychiatric/Behavioral: Negative for agitation, behavioral problems and sleep disturbance  All other systems reviewed and are negative         Problem List:     Patient Active Problem List   Diagnosis   • Abnormal RBC   • Adenocarcinoma of prostate [facility])   • Microscopic hematuria   • Colon polyps   • Mixed hyperlipidemia   • Essential hypertension   • Proteinuria   • Psoriasis   • Stage 2 chronic kidney disease   • Hypercalcemia   • Smoldering multiple myeloma   • Dermatitis   • Stage 3b chronic kidney disease [facility])      Past Medical and Surgical History:     Past Medical History:   Diagnosis Date   • Adenocarcinoma of prostate ([address] )     Last assessed 08/08/17   • Chronic kidney disease    • Colon polyp    • Psoriasis      Past Surgical History:   Procedure Laterality Date   • COLONOSCOPY     • IR BIOPSY LYMPH NODE  6/13/2022   • [place] COLONOSCOPY FLX DX W/COLLJ SPEC WHEN PFRMD N/A 08/31/2018    Procedure: COLONOSCOPY;  Surgeon: Fidel Bunch MD;  Location: [address] [place] [address] GI LAB; Service: Colorectal   • PROSTATE BIOPSY  02/16/2018   • TONSILLECTOMY        Family History:     Family History   Problem Relation Age of Onset   • Arthritis Mother    • Hyperlipidemia Mother    • Hypertension Mother    • Diabetes Son       Social History:     Social History     Socioeconomic History   • Marital status: /Civil Union     Spouse name: None   • Number of children: None   • Years of education: None   • Highest education level: None   Occupational History   • Occupation:  retired IT and finance   Tobacco Use   • Smoking status: Never   • Smokeless tobacco: Never   Vaping Use   • Vaping Use: Never used   Substance and Sexual Activity   • Alcohol use: Yes     Comment: very rarely   • Drug use: No   • Sexual activity: Yes     Partners: Female   Other Topics Concern   • None   Social History Narrative   • None     Social Determinants of Health     Financial Resource Strain: Low Risk    • Difficulty of Paying Living Expenses: Not hard at all   Food Insecurity: Not on file   Transportation Needs: No Transportation Needs   • Lack of Transportation (Medical): No   • Lack of Transportation (Non-Medical):  No   Physical Activity: Not on file   Stress: Not on file   Social Connections: Not on file   Intimate Partner Violence: Not on file   Housing Stability: Not on file      Medications and Allergies:     Current Outpatient Medications Medication Sig Dispense Refill   • cholecalciferol (VITAMIN D3) 1,000 units tablet Take 1 tablet (1,000 Units total) by mouth daily     • Guselkumab 100 MG/ML SOSY Inject per derm q 8 weeks     • hydrochlorothiazide (HYDRODIURIL) 12 5 mg tablet TAKE 1 TABLET BY MOUTH EVERY DAY 90 tablet 1   • lisinopril (ZESTRIL) 40 mg tablet TAKE 1 TABLET BY MOUTH EVERY DAY 90 tablet 1   • lovastatin (MEVACOR) 40 MG tablet TAKE 1 TABLET BY MOUTH EVERY DAY 90 tablet 0   • Tremfya subcutaneous injection        No current facility-administered medications for this visit  Allergies   Allergen Reactions   • Amlodipine Hives   • Penicillins Hives   • Fenofibrate Itching and Rash   • Iodinated Contrast Media Rash      Immunizations:     Immunization History   Administered Date(s) Administered   • COVID-19 MODERNA VACC 0 25 ML IM BOOSTER 12/29/2021   • COVID-19 MODERNA VACC 0 5 ML IM 04/13/2021, 05/13/2021   • Influenza, recombinant, quadrivalent,injectable, preservative free 10/26/2020   • Tdap 03/30/2019      Health Maintenance:         Topic Date Due   • HIV Screening  Never done   • Colorectal Cancer Screening  06/07/2027   • Hepatitis C Screening  Completed         Topic Date Due   • Pneumococcal Vaccine: 65+ Years (1 - PCV) Never done   • COVID-19 Vaccine (3 - [employer] risk series) 01/26/2022   • Influenza Vaccine (1) 09/01/2022      Medicare Screening Tests and Risk Assessments:     Marlo Quezada is here for his Initial Wellness visit  Health Risk Assessment:   Patient rates overall health as very good  Patient feels that their physical health rating is same  Patient is satisfied with their life  Eyesight was rated as same  Hearing was rated as same  Patient feels that their emotional and mental health rating is same  Patients states they are sometimes angry  Patient states they are sometimes unusually tired/fatigued  Pain experienced in the last 7 days has been none   Patient states that he has experienced no weight loss or gain in last 6 months  Depression Screening:   PHQ-2 Score: 0      Fall Risk Screening: In the past year, patient has experienced: no history of falling in past year      Home Safety:  Patient does not have trouble with stairs inside or outside of their home  Patient has working smoke alarms and has working carbon monoxide detector  Home safety hazards include: none  Nutrition:   Current diet is Regular  Medications:   Patient is currently taking over-the-counter supplements  OTC medications include: see medication list  Patient is able to manage medications  Activities of Daily Living (ADLs)/Instrumental Activities of Daily Living (IADLs):   Walk and transfer into and out of bed and chair?: Yes  Dress and groom yourself?: Yes    Bathe or shower yourself?: Yes    Feed yourself? Yes  Do your laundry/housekeeping?: Yes  Manage your money, pay your bills and track your expenses?: Yes  Make your own meals?: Yes    Do your own shopping?: Yes    Previous Hospitalizations:   Any hospitalizations or ED visits within the last 12 months?: No      Advance Care Planning:   Living will: Yes    Durable POA for healthcare:  Yes    Advanced directive: Yes      Cognitive Screening:   Provider or family/friend/caregiver concerned regarding cognition?: No    PREVENTIVE SCREENINGS      Cardiovascular Screening:    General: Screening Not Indicated and History Lipid Disorder      Diabetes Screening:     General: Screening Current      Colorectal Cancer Screening:     General: Screening Current      Prostate Cancer Screening:    General: History Prostate Cancer      Osteoporosis Screening:    General: Screening Not Indicated      Abdominal Aortic Aneurysm (AAA) Screening:    Risk factors include: age between 73-67 yo        Lung Cancer Screening:     General: Screening Not Indicated      Hepatitis C Screening:    General: Screening Current    Screening, Brief Intervention, and Referral to Treatment (SBIRT)    Screening  Typical number of drinks in a day: 0  Typical number of drinks in a week: 0  Interpretation: Low risk drinking behavior  No results found  Physical Exam:     /76 (BP Location: Left arm, Patient Position: Sitting, Cuff Size: Large)   Pulse 68   Resp 16   Ht 5' 10" (1 778 m)   Wt 85 5 kg (188 lb 6 4 oz)   BMI 27 03 kg/m²     Physical Exam  Constitutional:       General: He is not in acute distress  Appearance: He is well-developed  HENT:      Head: Normocephalic and atraumatic  Right Ear: Tympanic membrane normal       Left Ear: Tympanic membrane normal    Eyes:      Conjunctiva/sclera: Conjunctivae normal    Cardiovascular:      Rate and Rhythm: Normal rate and regular rhythm  Pulmonary:      Effort: Pulmonary effort is normal    Abdominal:      General: Abdomen is flat  Bowel sounds are normal  There is no distension  Palpations: Abdomen is soft  There is no mass  Musculoskeletal:         General: Normal range of motion  Cervical back: Normal range of motion  Skin:     General: Skin is warm  Findings: No rash  Neurological:      Mental Status: He is alert and oriented to person, place, and time     Psychiatric:         Mood and Affect: Mood normal           Jabier Vale PA-C

## 2023-01-23 ENCOUNTER — APPOINTMENT (OUTPATIENT)
Dept: LAB | Facility: CLINIC | Age: 68
End: 2023-01-23

## 2023-01-23 DIAGNOSIS — D47.2 SMOLDERING MULTIPLE MYELOMA: ICD-10-CM

## 2023-01-23 LAB
ALBUMIN SERPL BCP-MCNC: 3.6 G/DL (ref 3.5–5)
ALP SERPL-CCNC: 57 U/L (ref 46–116)
ALT SERPL W P-5'-P-CCNC: 21 U/L (ref 12–78)
ANION GAP SERPL CALCULATED.3IONS-SCNC: 4 MMOL/L (ref 4–13)
AST SERPL W P-5'-P-CCNC: 18 U/L (ref 5–45)
BASOPHILS # BLD AUTO: 0.03 THOUSANDS/ÂΜL (ref 0–0.1)
BASOPHILS NFR BLD AUTO: 0 % (ref 0–1)
BILIRUB SERPL-MCNC: 0.46 MG/DL (ref 0.2–1)
BUN SERPL-MCNC: 38 MG/DL (ref 5–25)
CALCIUM SERPL-MCNC: 10.3 MG/DL (ref 8.3–10.1)
CHLORIDE SERPL-SCNC: 109 MMOL/L (ref 96–108)
CO2 SERPL-SCNC: 27 MMOL/L (ref 21–32)
CREAT SERPL-MCNC: 2.17 MG/DL (ref 0.6–1.3)
EOSINOPHIL # BLD AUTO: 0.27 THOUSAND/ÂΜL (ref 0–0.61)
EOSINOPHIL NFR BLD AUTO: 3 % (ref 0–6)
ERYTHROCYTE [DISTWIDTH] IN BLOOD BY AUTOMATED COUNT: 15.3 % (ref 11.6–15.1)
GFR SERPL CREATININE-BSD FRML MDRD: 30 ML/MIN/1.73SQ M
GLUCOSE P FAST SERPL-MCNC: 82 MG/DL (ref 65–99)
HCT VFR BLD AUTO: 47.8 % (ref 36.5–49.3)
HGB BLD-MCNC: 14.1 G/DL (ref 12–17)
IGA SERPL-MCNC: 167 MG/DL (ref 70–400)
IGG SERPL-MCNC: 1510 MG/DL (ref 700–1600)
IGM SERPL-MCNC: 57 MG/DL (ref 40–230)
IMM GRANULOCYTES # BLD AUTO: 0.03 THOUSAND/UL (ref 0–0.2)
IMM GRANULOCYTES NFR BLD AUTO: 0 % (ref 0–2)
LYMPHOCYTES # BLD AUTO: 2.11 THOUSANDS/ÂΜL (ref 0.6–4.47)
LYMPHOCYTES NFR BLD AUTO: 26 % (ref 14–44)
MCH RBC QN AUTO: 25.6 PG (ref 26.8–34.3)
MCHC RBC AUTO-ENTMCNC: 29.5 G/DL (ref 31.4–37.4)
MCV RBC AUTO: 87 FL (ref 82–98)
MONOCYTES # BLD AUTO: 1.18 THOUSAND/ÂΜL (ref 0.17–1.22)
MONOCYTES NFR BLD AUTO: 15 % (ref 4–12)
NEUTROPHILS # BLD AUTO: 4.47 THOUSANDS/ÂΜL (ref 1.85–7.62)
NEUTS SEG NFR BLD AUTO: 56 % (ref 43–75)
NRBC BLD AUTO-RTO: 0 /100 WBCS
PLATELET # BLD AUTO: 192 THOUSANDS/UL (ref 149–390)
PMV BLD AUTO: 11 FL (ref 8.9–12.7)
POTASSIUM SERPL-SCNC: 4.3 MMOL/L (ref 3.5–5.3)
PROT SERPL-MCNC: 7.7 G/DL (ref 6.4–8.4)
RBC # BLD AUTO: 5.51 MILLION/UL (ref 3.88–5.62)
SODIUM SERPL-SCNC: 140 MMOL/L (ref 135–147)
WBC # BLD AUTO: 8.09 THOUSAND/UL (ref 4.31–10.16)

## 2023-01-24 ENCOUNTER — APPOINTMENT (OUTPATIENT)
Dept: LAB | Facility: CLINIC | Age: 68
End: 2023-01-24

## 2023-01-24 DIAGNOSIS — D47.2 SMOLDERING MULTIPLE MYELOMA: ICD-10-CM

## 2023-01-24 LAB
KAPPA LC FREE SER-MCNC: 95.9 MG/L (ref 3.3–19.4)
KAPPA LC FREE/LAMBDA FREE SER: 4.82 {RATIO} (ref 0.26–1.65)
LAMBDA LC FREE SERPL-MCNC: 19.9 MG/L (ref 5.7–26.3)
PROT 24H UR-MCNC: 1204 MG/24 HRS (ref 40–150)
SPECIMEN VOL UR: 2150 ML

## 2023-01-25 LAB
ALBUMIN SERPL ELPH-MCNC: 3.99 G/DL (ref 3.5–5)
ALBUMIN SERPL ELPH-MCNC: 54.6 % (ref 52–65)
ALPHA1 GLOB SERPL ELPH-MCNC: 0.32 G/DL (ref 0.1–0.4)
ALPHA1 GLOB SERPL ELPH-MCNC: 4.4 % (ref 2.5–5)
ALPHA2 GLOB SERPL ELPH-MCNC: 0.75 G/DL (ref 0.4–1.2)
ALPHA2 GLOB SERPL ELPH-MCNC: 10.3 % (ref 7–13)
BETA GLOB ABNORMAL SERPL ELPH-MCNC: 0.39 G/DL (ref 0.4–0.8)
BETA1 GLOB SERPL ELPH-MCNC: 5.3 % (ref 5–13)
BETA2 GLOB SERPL ELPH-MCNC: 5 % (ref 2–8)
BETA2+GAMMA GLOB SERPL ELPH-MCNC: 0.37 G/DL (ref 0.2–0.5)
GAMMA GLOB ABNORMAL SERPL ELPH-MCNC: 1.49 G/DL (ref 0.5–1.6)
GAMMA GLOB SERPL ELPH-MCNC: 20.4 % (ref 12–22)
IGG/ALB SER: 1.2 {RATIO} (ref 1.1–1.8)
M PROTEIN 1 MFR SERPL ELPH: 9.5 %
M PROTEIN 1 SERPL ELPH-MCNC: 0.69 G/DL
PROT PATTERN SERPL ELPH-IMP: ABNORMAL
PROT SERPL-MCNC: 7.3 G/DL (ref 6.4–8.2)

## 2023-02-01 ENCOUNTER — OFFICE VISIT (OUTPATIENT)
Dept: HEMATOLOGY ONCOLOGY | Facility: CLINIC | Age: 68
End: 2023-02-01

## 2023-02-01 ENCOUNTER — TELEPHONE (OUTPATIENT)
Dept: HEMATOLOGY ONCOLOGY | Facility: CLINIC | Age: 68
End: 2023-02-01

## 2023-02-01 VITALS
SYSTOLIC BLOOD PRESSURE: 124 MMHG | BODY MASS INDEX: 26.48 KG/M2 | OXYGEN SATURATION: 100 % | WEIGHT: 185 LBS | HEIGHT: 70 IN | DIASTOLIC BLOOD PRESSURE: 82 MMHG | RESPIRATION RATE: 18 BRPM | TEMPERATURE: 97.3 F | HEART RATE: 80 BPM

## 2023-02-01 DIAGNOSIS — D47.2 SMOLDERING MULTIPLE MYELOMA: Primary | ICD-10-CM

## 2023-02-01 NOTE — TELEPHONE ENCOUNTER
[employer] form completed by Donn Velarde PA-C, copied/scanned into media and placed to be taken to [facility]

## 2023-02-01 NOTE — PROGRESS NOTES
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  BMBX April 2022 showed 12-15% plasma cells, normal cytogenetics, fish panel for multiple myeloma is normal, molecular test however showed NF1 variant of unknown clinical significance  Hemoglobin remains normal and stable  His BUN and Cr are creeping up    6/21  Cr around 1 5 - 1 6       6/22 -12/22 - 1 8 - 2   Fluctuant calcium  9 6 - 10 5   Total protein and albumin remain stable  Kappa light chain and ratio increasing  U/S kidney and bladder with pvr -11/10/22   Increased echogenicity of the kidneys again noted suggesting medical renal disease    24 hour urine protein 1/24/23 1200 (ULN 150mg)     Discussed repeating bone marrow aspirate and biopsy  He is agreeable  F/U thereafter for review  HPI:   Colleen Hays is a 74 y o  seen for initial consultation 12/5/2019 at the referral of Ambar Kim DO regarding MGUS and elevated H/H       9/4/2019:  Normal quantitative immunoglobulins  Creatinine 1 61 otherwise normal CMP  Creatinine has been steadily been increasing slowly since 2016  He follows with Dr Jay Jay Brown regarding his CKD  Hemoglobin 16 3, hematocrit 51 2, white blood cell count 8 2 with normal differential, platelet count 589     10/21/2019:  SPEP with immunofixation identified IgG kappa monoclonal band measuring 0 3 gram/deciliter      Workup for MGUS 12/2019 showed normal quantitative immunoglobulins, kappa light chain 14 7, lambda light chain 11 1, erythropoietin 5 4, negative for JAK2 mutation profile as well as MPL 1, CALR for polycythemia, hemoglobin electrophoresis normal        Does not smoke or drink  He has psoriasis followed by Dr Hannah Moraes         He follows with Dr Nico Bolanos regarding small volume [place] 6 prostate cancer diagnosed on an initial prostate biopsy in 2017  In 2018, a surveillance biopsy again showed 3 of 12 cores with [place] 6 prostate cancer  He is on active surveillance  Bone marrow biopsy in April 2022 showed 12-15% plasma cells, normal cytogenetics, fish panel for multiple myeloma is normal, molecular test however showed NF1 variant of unknown clinical significance  PSMA PET scan 5/2022 Variable mild radiotracer uptake in the previously noted prominent external iliac chain and right inguinal lymph nodes, nonspecific  No PSMA avid osseous lesions  6/13/22 Lymph node biopsy was inconclusive for metastatic carcinoma or lymphoma  6/2022- colonoscopy  12/2022 - MRI of the prostate -PI-RADSv2 1 Category 2 - Low (clinically significant cancer is unlikely to be present)  No extraprostatic tumor, seminal vesicle invasion, or pelvic osseous metastatic disease  Slightly decreased size of pelvic lymphadenopathy, in keeping with the reactive lymph node pathology on recent right external iliac lymph node biopsy 6/13/2022  Interval History:    Patient is feeling very well  Test Results:        Labs:   Lab Results   Component Value Date    HGB 14 1 01/23/2023    HCT 47 8 01/23/2023    MCV 87 01/23/2023     01/23/2023    WBC 8 09 01/23/2023    NRBC 0 01/23/2023     Lab Results   Component Value Date     11/30/2017     11/30/2017     11/30/2017     11/30/2017    K 4 3 01/23/2023     (H) 01/23/2023    CO2 27 01/23/2023    BUN 38 (H) 01/23/2023    CREATININE 2 17 (H) 01/23/2023    GLUF 82 01/23/2023    CALCIUM 10 3 (H) 01/23/2023    CORRECTEDCA 10 9 (H) 11/21/2022    AST 18 01/23/2023    ALT 21 01/23/2023    ALKPHOS 57 01/23/2023    PROT 7 1 11/30/2017    PROT 7 1 11/30/2017    PROT 7 1 11/30/2017    PROT 7 1 11/30/2017    BILITOT 0 5 11/30/2017    BILITOT 0 5 11/30/2017    BILITOT 0 5 11/30/2017    BILITOT 0 5 11/30/2017    EGFR 30 01/23/2023       Imaging: No results found  ROS:  As mentioned in HPI & Interval History otherwise 14 point ROS negative  Allergies: Allergies   Allergen Reactions   • Amlodipine Hives   • Penicillins Hives   • Fenofibrate Itching and Rash   • Iodinated Contrast Media Rash     Current Medications: Reviewed  PMH/FH/SH:  Reviewed      Physical Exam:    There is no height or weight on file to calculate BSA  Ht Readings from Last 3 Encounters:   23 5' 10" (1 778 m)   22 5' 10" (1 778 m)   22 5' 11" (1 803 m)        Wt Readings from Last 3 Encounters:   23 85 5 kg (188 lb 6 4 oz)   22 83 9 kg (185 lb)   22 84 3 kg (185 lb 12 8 oz)        Temp Readings from Last 3 Encounters:   22 97 7 °F (36 5 °C) (Temporal)   22 (!) 96 5 °F (35 8 °C) (Temporal)   22 97 5 °F (36 4 °C) (Temporal)        BP Readings from Last 3 Encounters:   23 124/76   22 124/82   22 124/80           Physical Exam  Constitutional:       Appearance: He is well-developed  HENT:      Head: Normocephalic and atraumatic  Cardiovascular:      Rate and Rhythm: Normal rate and regular rhythm  Heart sounds: Normal heart sounds  Pulmonary:      Effort: Pulmonary effort is normal  No respiratory distress  Breath sounds: Normal breath sounds  No stridor  No rhonchi  Abdominal:      General: Abdomen is flat  Palpations: Abdomen is soft  Skin:     General: Skin is warm and dry  Neurological:      Mental Status: He is alert and oriented to person, place, and time     Psychiatric:         Behavior: Behavior normal          ECO      Emergency Contacts:    Extended Emergency Contact Information  Primary Emergency Contact: [address] [place] [place] Phone: [phone]  Relation: Spouse

## 2023-02-01 NOTE — H&P (VIEW-ONLY)
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  BMBX April 2022 showed 12-15% plasma cells, normal cytogenetics, fish panel for multiple myeloma is normal, molecular test however showed NF1 variant of unknown clinical significance  Hemoglobin remains normal and stable  His BUN and Cr are creeping up    6/21  Cr around 1 5 - 1 6       6/22 -12/22 - 1 8 - 2   Fluctuant calcium  9 6 - 10 5   Total protein and albumin remain stable  Kappa light chain and ratio increasing  U/S kidney and bladder with pvr -11/10/22   Increased echogenicity of the kidneys again noted suggesting medical renal disease    24 hour urine protein 1/24/23 1200 (ULN 150mg)     Discussed repeating bone marrow aspirate and biopsy  He is agreeable  F/U thereafter for review  HPI:   Miles Alex is a 74 y o  seen for initial consultation 12/5/2019 at the referral of Aimee Alicia DO regarding MGUS and elevated H/H       9/4/2019:  Normal quantitative immunoglobulins  Creatinine 1 61 otherwise normal CMP  Creatinine has been steadily been increasing slowly since 2016  He follows with Dr Pieter Churchill regarding his CKD  Hemoglobin 16 3, hematocrit 51 2, white blood cell count 8 2 with normal differential, platelet count 217     10/21/2019:  SPEP with immunofixation identified IgG kappa monoclonal band measuring 0 3 gram/deciliter      Workup for MGUS 12/2019 showed normal quantitative immunoglobulins, kappa light chain 14 7, lambda light chain 11 1, erythropoietin 5 4, negative for JAK2 mutation profile as well as MPL 1, CALR for polycythemia, hemoglobin electrophoresis normal        Does not smoke or drink  He has psoriasis followed by Dr Katheryn Cat         He follows with Dr Maurice Bagley regarding small volume [place] 6 prostate cancer diagnosed on an initial prostate biopsy in 2017  In 2018, a surveillance biopsy again showed 3 of 12 cores with [place] 6 prostate cancer  He is on active surveillance  Bone marrow biopsy in April 2022 showed 12-15% plasma cells, normal cytogenetics, fish panel for multiple myeloma is normal, molecular test however showed NF1 variant of unknown clinical significance  PSMA PET scan 5/2022 Variable mild radiotracer uptake in the previously noted prominent external iliac chain and right inguinal lymph nodes, nonspecific  No PSMA avid osseous lesions  6/13/22 Lymph node biopsy was inconclusive for metastatic carcinoma or lymphoma  6/2022- colonoscopy  12/2022 - MRI of the prostate -PI-RADSv2 1 Category 2 - Low (clinically significant cancer is unlikely to be present)  No extraprostatic tumor, seminal vesicle invasion, or pelvic osseous metastatic disease  Slightly decreased size of pelvic lymphadenopathy, in keeping with the reactive lymph node pathology on recent right external iliac lymph node biopsy 6/13/2022  Interval History:    Patient is feeling very well  Test Results:        Labs:   Lab Results   Component Value Date    HGB 14 1 01/23/2023    HCT 47 8 01/23/2023    MCV 87 01/23/2023     01/23/2023    WBC 8 09 01/23/2023    NRBC 0 01/23/2023     Lab Results   Component Value Date     11/30/2017     11/30/2017     11/30/2017     11/30/2017    K 4 3 01/23/2023     (H) 01/23/2023    CO2 27 01/23/2023    BUN 38 (H) 01/23/2023    CREATININE 2 17 (H) 01/23/2023    GLUF 82 01/23/2023    CALCIUM 10 3 (H) 01/23/2023    CORRECTEDCA 10 9 (H) 11/21/2022    AST 18 01/23/2023    ALT 21 01/23/2023    ALKPHOS 57 01/23/2023    PROT 7 1 11/30/2017    PROT 7 1 11/30/2017    PROT 7 1 11/30/2017    PROT 7 1 11/30/2017    BILITOT 0 5 11/30/2017    BILITOT 0 5 11/30/2017    BILITOT 0 5 11/30/2017    BILITOT 0 5 11/30/2017    EGFR 30 01/23/2023       Imaging: No results found  ROS:  As mentioned in HPI & Interval History otherwise 14 point ROS negative  Allergies: Allergies   Allergen Reactions   • Amlodipine Hives   • Penicillins Hives   • Fenofibrate Itching and Rash   • Iodinated Contrast Media Rash     Current Medications: Reviewed  PMH/FH/SH:  Reviewed      Physical Exam:    There is no height or weight on file to calculate BSA  Ht Readings from Last 3 Encounters:   23 5' 10" (1 778 m)   22 5' 10" (1 778 m)   22 5' 11" (1 803 m)        Wt Readings from Last 3 Encounters:   23 85 5 kg (188 lb 6 4 oz)   22 83 9 kg (185 lb)   22 84 3 kg (185 lb 12 8 oz)        Temp Readings from Last 3 Encounters:   22 97 7 °F (36 5 °C) (Temporal)   22 (!) 96 5 °F (35 8 °C) (Temporal)   22 97 5 °F (36 4 °C) (Temporal)        BP Readings from Last 3 Encounters:   23 124/76   22 124/82   22 124/80           Physical Exam  Constitutional:       Appearance: He is well-developed  HENT:      Head: Normocephalic and atraumatic  Cardiovascular:      Rate and Rhythm: Normal rate and regular rhythm  Heart sounds: Normal heart sounds  Pulmonary:      Effort: Pulmonary effort is normal  No respiratory distress  Breath sounds: Normal breath sounds  No stridor  No rhonchi  Abdominal:      General: Abdomen is flat  Palpations: Abdomen is soft  Skin:     General: Skin is warm and dry  Neurological:      Mental Status: He is alert and oriented to person, place, and time     Psychiatric:         Behavior: Behavior normal          [facility]O      Emergency Contacts:    Extended Emergency Contact Information  Primary Emergency Contact: [address] [place] [place] Phone: [phone]  Relation: Spouse

## 2023-02-21 ENCOUNTER — HOSPITAL ENCOUNTER (OUTPATIENT)
Dept: RADIOLOGY | Facility: HOSPITAL | Age: 68
Discharge: HOME | End: 2023-02-21
Attending: INTERNAL MEDICINE | Admitting: RADIOLOGY

## 2023-02-21 VITALS
RESPIRATION RATE: 14 BRPM | SYSTOLIC BLOOD PRESSURE: 102 MMHG | TEMPERATURE: 98 F | BODY MASS INDEX: 25.9 KG/M2 | DIASTOLIC BLOOD PRESSURE: 58 MMHG | HEART RATE: 72 BPM | HEIGHT: 71 IN | WEIGHT: 185 LBS | OXYGEN SATURATION: 98 %

## 2023-02-21 DIAGNOSIS — D47.2 SMOLDERING MULTIPLE MYELOMA: ICD-10-CM

## 2023-02-21 LAB
ERYTHROCYTE [DISTWIDTH] IN BLOOD BY AUTOMATED COUNT: 15.4 % (ref 11.6–15.1)
HCT VFR BLD AUTO: 46.1 % (ref 36.5–49.3)
HGB BLD-MCNC: 14.3 G/DL (ref 12–17)
MCH RBC QN AUTO: 26.1 PG (ref 26.8–34.3)
MCHC RBC AUTO-ENTMCNC: 31 G/DL (ref 31.4–37.4)
MCV RBC AUTO: 84 FL (ref 82–98)
NRBC BLD AUTO-RTO: 0 /100 WBCS
PLATELET # BLD AUTO: 126 THOUSANDS/UL (ref 149–390)
PMV BLD AUTO: 10.2 FL (ref 8.9–12.7)
RBC # BLD AUTO: 5.48 MILLION/UL (ref 3.88–5.62)
WBC # BLD AUTO: 8.25 THOUSAND/UL (ref 4.31–10.16)

## 2023-02-21 RX ORDER — LIDOCAINE HYDROCHLORIDE 10 MG/ML
INJECTION, SOLUTION EPIDURAL; INFILTRATION; INTRACAUDAL; PERINEURAL AS NEEDED
Status: COMPLETED | OUTPATIENT
Start: 2023-02-21 | End: 2023-02-21

## 2023-02-21 RX ORDER — SODIUM CHLORIDE 9 MG/ML
75 INJECTION, SOLUTION INTRAVENOUS CONTINUOUS
Status: DISCONTINUED | OUTPATIENT
Start: 2023-02-21 | End: 2023-02-22 | Stop reason: HOSPADM

## 2023-02-21 RX ORDER — MIDAZOLAM HYDROCHLORIDE 2 MG/2ML
INJECTION, SOLUTION INTRAMUSCULAR; INTRAVENOUS AS NEEDED
Status: COMPLETED | OUTPATIENT
Start: 2023-02-21 | End: 2023-02-21

## 2023-02-21 RX ORDER — FENTANYL CITRATE 50 UG/ML
INJECTION, SOLUTION INTRAMUSCULAR; INTRAVENOUS AS NEEDED
Status: COMPLETED | OUTPATIENT
Start: 2023-02-21 | End: 2023-02-21

## 2023-02-21 RX ADMIN — LIDOCAINE HYDROCHLORIDE 10 ML: 10 INJECTION, SOLUTION EPIDURAL; INFILTRATION; INTRACAUDAL; PERINEURAL at 12:48

## 2023-02-21 RX ADMIN — MIDAZOLAM 1 MG: 1 INJECTION INTRAMUSCULAR; INTRAVENOUS at 12:41

## 2023-02-21 RX ADMIN — FENTANYL CITRATE 50 MCG: 50 INJECTION INTRAMUSCULAR; INTRAVENOUS at 12:48

## 2023-02-21 RX ADMIN — SODIUM CHLORIDE 75 ML/HR: 0.9 INJECTION, SOLUTION INTRAVENOUS at 11:43

## 2023-02-21 RX ADMIN — FENTANYL CITRATE 50 MCG: 50 INJECTION INTRAMUSCULAR; INTRAVENOUS at 12:41

## 2023-02-21 NOTE — INTERVAL H&P NOTE
H&P reviewed  There have been no interval changes since the time the H&P was written  Vitals:    02/21/23 1126   BP: 132/72   Pulse: 72   Resp: 16   Temp: 98 °F (36 7 °C)   SpO2: 99%         Patient with previous bone marrow biopsy approximately 1 year prior in oncology office  Patient presenting for repeat biopsy to follow up on multiple myeloma  Patient has been NPO  Allergies reviewed  Informed written consent was obtained and questions were answered        Rina Baumgarten PA-C

## 2023-02-21 NOTE — BRIEF OP NOTE (RAD/CATH)
IR BIOPSY BONE MARROW Procedure Note    PATIENT NAME: Brenda Jovel  : 1955  MRN: [id number]    Pre-op Diagnosis:   1  Smoldering multiple myeloma      Post-op Diagnosis:   1   Smoldering multiple myeloma        Provider:  Nicolette Roman PA-C    No qualified resident was available, Resident is only observing    Estimated Blood Loss: minimal    Findings: right sided posterior iliac crest bone marrow aspiration and core    Specimens: sent    Complications:  None immediate    Anesthesia: conscious sedation and local    Nicolette Roman PA-C     Date: 2023  Time: 1:08 PM

## 2023-02-21 NOTE — DISCHARGE INSTRUCTIONS
Bone Marrow Biopsy     WHAT YOU NEED TO KNOW:   A bone marrow biopsy is a procedure to remove a small amount of bone marrow from your bone  Bone marrow is the soft tissue inside your bone that helps to make blood cells  The sample is tested for disease or infection  DISCHARGE INSTRUCTIONS:     1  Limit your activities day of biopsy as directed by your doctor  2  Use medication as ordered  3  Return to your normal diet  Small sips of flat soda will help with nausea  4  Remove band-aid or dressing 24 hours after procedure  Contact Interventional Radiology at [phone] [place] PATIENTS: Contact Interventional Radiology at [phone]) Tamara Ding PATIENTS: Contact Interventional Radiology at [phone]) if:    1  Difficulty breathing, nausea or vomiting  2  Chills or fever above 101 F     3  Pain at biopsy site not relieved by medication  4  Develop any redness, swelling, heat, unusual drainage, heavy bruising or bleeding from biopsy site  Procedural Sedation   WHAT YOU NEED TO KNOW:   Procedural sedation is medicine used during procedures to help you feel relaxed and calm  You will remember little to none of the procedure  After sedation you may feel tired, weak, or unsteady on your feet  You may also have trouble concentrating or short-term memory loss  These symptoms should go away in 24 hours or less  DISCHARGE INSTRUCTIONS:     Call 911 or have someone else call for any of the following: You have sudden trouble breathing  You cannot be woken  Contact Interventional Radiology at [phone]   [place] PATIENTS: Contact Interventional Radiology at [phone]) Tamara Ding PATIENTS: Contact Interventional Radiology at [phone]) if any of the following occur: You have a severe headache or dizziness  Your heart is beating faster than usual     You have a fever or chills  Your skin is itchy, swollen, or you have a rash       You have nausea or are vomiting for more than 8 hours after the procedure  You have questions or concerns about your condition or care  Self-care:   Have someone stay with you for 24 hours  This person can drive you to errands and help you do things around the house  This person can also watch for problems  Rest and do quiet activities for 24 hours  Do not exercise, ride a bike, or play sports  Stand up slowly to prevent dizziness and falls  Take short walks around the house with another person  Slowly return to your usual activities the next day  Do not drive or use dangerous machines or tools for 24 hours  You may injure yourself or others  Examples include a lawnmower, saw, or drill  Do not return to work for 24 hours if you use dangerous machines or tools for work  Do not make important decisions for 24 hours  For example, do not sign important papers or invest money  Drink liquids as directed  Liquids help flush the sedation medicine out of your body  Ask how much liquid to drink each day and which liquids are best for you  Eat small, frequent meals to prevent nausea and vomiting  Start with clear liquids such as juice or broth  If you do not vomit after clear liquids, you can eat your usual foods  Do not drink alcohol or take medicines that make you drowsy  This includes medicines that help you sleep and anxiety medicines  Ask your healthcare provider if it is safe for you to take pain medicine  Follow up with your healthcare provider as directed: Write down your questions so you remember to ask them during your visits

## 2023-02-22 LAB
IMM EOSINOPHIL NFR BLD MANUAL: 2 % (ref 0–6)
LYMPHOCYTES NFR BLD: 20 % (ref 14–44)
MONOCYTES NFR BLD AUTO: 13 % (ref 4–12)
NEUTS SEG NFR BLD AUTO: 65 % (ref 45–77)
PATHOLOGIST INTERPRETATION: NORMAL
PLATELET BLD QL SMEAR: ABNORMAL
RBC MORPH BLD: NORMAL
SCAN RESULT: NORMAL
TOTAL CELLS COUNTED SPEC: 100

## 2023-02-23 ENCOUNTER — TELEPHONE (OUTPATIENT)
Dept: NEPHROLOGY | Facility: CLINIC | Age: 68
End: 2023-02-23

## 2023-02-23 DIAGNOSIS — R31.29 MICROSCOPIC HEMATURIA: ICD-10-CM

## 2023-02-23 DIAGNOSIS — N18.32 STAGE 3B CHRONIC KIDNEY DISEASE (HCC): Primary | ICD-10-CM

## 2023-02-23 DIAGNOSIS — N18.2 STAGE 2 CHRONIC KIDNEY DISEASE: ICD-10-CM

## 2023-02-23 DIAGNOSIS — I10 ESSENTIAL HYPERTENSION: ICD-10-CM

## 2023-02-23 DIAGNOSIS — R80.9 PROTEINURIA, UNSPECIFIED TYPE: ICD-10-CM

## 2023-02-23 NOTE — TELEPHONE ENCOUNTER
Patient called and was wondering what blood work he needed before the follow up appointment  Labs have been added to the patients chart

## 2023-02-28 ENCOUNTER — APPOINTMENT (OUTPATIENT)
Dept: LAB | Facility: CLINIC | Age: 68
End: 2023-02-28

## 2023-02-28 DIAGNOSIS — N18.2 STAGE 2 CHRONIC KIDNEY DISEASE: ICD-10-CM

## 2023-02-28 DIAGNOSIS — I10 ESSENTIAL HYPERTENSION: ICD-10-CM

## 2023-02-28 DIAGNOSIS — D47.2 MONOCLONAL PARAPROTEINEMIA: ICD-10-CM

## 2023-02-28 DIAGNOSIS — R80.9 PROTEINURIA, UNSPECIFIED TYPE: ICD-10-CM

## 2023-02-28 DIAGNOSIS — R31.29 MICROSCOPIC HEMATURIA: ICD-10-CM

## 2023-02-28 DIAGNOSIS — D47.2 SMOLDERING MULTIPLE MYELOMA: ICD-10-CM

## 2023-02-28 DIAGNOSIS — N18.32 STAGE 3B CHRONIC KIDNEY DISEASE (HCC): ICD-10-CM

## 2023-02-28 LAB
ALBUMIN SERPL BCP-MCNC: 3.5 G/DL (ref 3.5–5)
ALP SERPL-CCNC: 53 U/L (ref 46–116)
ALT SERPL W P-5'-P-CCNC: 16 U/L (ref 12–78)
ANION GAP SERPL CALCULATED.3IONS-SCNC: 5 MMOL/L (ref 4–13)
AST SERPL W P-5'-P-CCNC: 20 U/L (ref 5–45)
BACTERIA UR QL AUTO: ABNORMAL /HPF
BASOPHILS # BLD AUTO: 0.03 THOUSANDS/ÂΜL (ref 0–0.1)
BASOPHILS NFR BLD AUTO: 0 % (ref 0–1)
BILIRUB SERPL-MCNC: 0.34 MG/DL (ref 0.2–1)
BILIRUB UR QL STRIP: NEGATIVE
BUN SERPL-MCNC: 36 MG/DL (ref 5–25)
CALCIUM SERPL-MCNC: 10.1 MG/DL (ref 8.3–10.1)
CHLORIDE SERPL-SCNC: 109 MMOL/L (ref 96–108)
CLARITY UR: CLEAR
CO2 SERPL-SCNC: 26 MMOL/L (ref 21–32)
COLOR UR: ABNORMAL
CREAT SERPL-MCNC: 2.16 MG/DL (ref 0.6–1.3)
CREAT UR-MCNC: 109 MG/DL
EOSINOPHIL # BLD AUTO: 0.26 THOUSAND/ÂΜL (ref 0–0.61)
EOSINOPHIL NFR BLD AUTO: 3 % (ref 0–6)
ERYTHROCYTE [DISTWIDTH] IN BLOOD BY AUTOMATED COUNT: 15.2 % (ref 11.6–15.1)
GFR SERPL CREATININE-BSD FRML MDRD: 30 ML/MIN/1.73SQ M
GLUCOSE P FAST SERPL-MCNC: 95 MG/DL (ref 65–99)
GLUCOSE UR STRIP-MCNC: NEGATIVE MG/DL
HCT VFR BLD AUTO: 49 % (ref 36.5–49.3)
HGB BLD-MCNC: 14.4 G/DL (ref 12–17)
HGB UR QL STRIP.AUTO: NEGATIVE
IGA SERPL-MCNC: 157 MG/DL (ref 70–400)
IGG SERPL-MCNC: 1460 MG/DL (ref 700–1600)
IGM SERPL-MCNC: 37 MG/DL (ref 40–230)
IMM GRANULOCYTES # BLD AUTO: 0.01 THOUSAND/UL (ref 0–0.2)
IMM GRANULOCYTES NFR BLD AUTO: 0 % (ref 0–2)
KETONES UR STRIP-MCNC: NEGATIVE MG/DL
LEUKOCYTE ESTERASE UR QL STRIP: NEGATIVE
LYMPHOCYTES # BLD AUTO: 2.11 THOUSANDS/ÂΜL (ref 0.6–4.47)
LYMPHOCYTES NFR BLD AUTO: 23 % (ref 14–44)
MCH RBC QN AUTO: 25.8 PG (ref 26.8–34.3)
MCHC RBC AUTO-ENTMCNC: 29.4 G/DL (ref 31.4–37.4)
MCV RBC AUTO: 88 FL (ref 82–98)
MONOCYTES # BLD AUTO: 1.34 THOUSAND/ÂΜL (ref 0.17–1.22)
MONOCYTES NFR BLD AUTO: 15 % (ref 4–12)
NEUTROPHILS # BLD AUTO: 5.27 THOUSANDS/ÂΜL (ref 1.85–7.62)
NEUTS SEG NFR BLD AUTO: 59 % (ref 43–75)
NITRITE UR QL STRIP: NEGATIVE
NON-SQ EPI CELLS URNS QL MICRO: ABNORMAL /HPF
NRBC BLD AUTO-RTO: 0 /100 WBCS
PH UR STRIP.AUTO: 5.5 [PH]
PLATELET # BLD AUTO: 139 THOUSANDS/UL (ref 149–390)
PMV BLD AUTO: 11.4 FL (ref 8.9–12.7)
POTASSIUM SERPL-SCNC: 4.3 MMOL/L (ref 3.5–5.3)
PROT SERPL-MCNC: 7.6 G/DL (ref 6.4–8.4)
PROT UR STRIP-MCNC: ABNORMAL MG/DL
PROT UR-MCNC: 50 MG/DL
PROT/CREAT UR: 0.46 MG/G{CREAT} (ref 0–0.1)
RBC # BLD AUTO: 5.58 MILLION/UL (ref 3.88–5.62)
RBC #/AREA URNS AUTO: ABNORMAL /HPF
SODIUM SERPL-SCNC: 140 MMOL/L (ref 135–147)
SP GR UR STRIP.AUTO: 1.01 (ref 1–1.03)
UROBILINOGEN UR STRIP-ACNC: <2 MG/DL
WBC # BLD AUTO: 9.02 THOUSAND/UL (ref 4.31–10.16)
WBC #/AREA URNS AUTO: ABNORMAL /HPF

## 2023-03-01 LAB
ALBUMIN SERPL ELPH-MCNC: 4.07 G/DL (ref 3.5–5)
ALBUMIN SERPL ELPH-MCNC: 55.8 % (ref 52–65)
ALPHA1 GLOB SERPL ELPH-MCNC: 0.32 G/DL (ref 0.1–0.4)
ALPHA1 GLOB SERPL ELPH-MCNC: 4.4 % (ref 2.5–5)
ALPHA2 GLOB SERPL ELPH-MCNC: 0.72 G/DL (ref 0.4–1.2)
ALPHA2 GLOB SERPL ELPH-MCNC: 9.8 % (ref 7–13)
BETA GLOB ABNORMAL SERPL ELPH-MCNC: 0.39 G/DL (ref 0.4–0.8)
BETA1 GLOB SERPL ELPH-MCNC: 5.4 % (ref 5–13)
BETA2 GLOB SERPL ELPH-MCNC: 4.5 % (ref 2–8)
BETA2+GAMMA GLOB SERPL ELPH-MCNC: 0.33 G/DL (ref 0.2–0.5)
GAMMA GLOB ABNORMAL SERPL ELPH-MCNC: 1.47 G/DL (ref 0.5–1.6)
GAMMA GLOB SERPL ELPH-MCNC: 20.1 % (ref 12–22)
IGG/ALB SER: 1.26 {RATIO} (ref 1.1–1.8)
KAPPA LC FREE SER-MCNC: 98.2 MG/L (ref 3.3–19.4)
KAPPA LC FREE/LAMBDA FREE SER: 4.86 {RATIO} (ref 0.26–1.65)
LAMBDA LC FREE SERPL-MCNC: 20.2 MG/L (ref 5.7–26.3)
M PROTEIN 1 MFR SERPL ELPH: 9.2 %
M PROTEIN 1 SERPL ELPH-MCNC: 0.67 G/DL
PROT PATTERN SERPL ELPH-IMP: ABNORMAL
PROT SERPL-MCNC: 7.3 G/DL (ref 6.4–8.2)

## 2023-03-05 LAB
MISCELLANEOUS LAB TEST RESULT: NORMAL
SCAN RESULT: NORMAL

## 2023-03-06 ENCOUNTER — TELEPHONE (OUTPATIENT)
Dept: NEPHROLOGY | Facility: CLINIC | Age: 68
End: 2023-03-06

## 2023-03-07 ENCOUNTER — OFFICE VISIT (OUTPATIENT)
Dept: NEPHROLOGY | Facility: CLINIC | Age: 68
End: 2023-03-07

## 2023-03-07 VITALS
DIASTOLIC BLOOD PRESSURE: 74 MMHG | WEIGHT: 183 LBS | HEIGHT: 71 IN | SYSTOLIC BLOOD PRESSURE: 122 MMHG | BODY MASS INDEX: 25.62 KG/M2

## 2023-03-07 DIAGNOSIS — E83.52 HYPERCALCEMIA: ICD-10-CM

## 2023-03-07 DIAGNOSIS — R80.9 PROTEINURIA, UNSPECIFIED TYPE: ICD-10-CM

## 2023-03-07 DIAGNOSIS — I10 ESSENTIAL HYPERTENSION: ICD-10-CM

## 2023-03-07 DIAGNOSIS — N18.32 STAGE 3B CHRONIC KIDNEY DISEASE (HCC): Primary | ICD-10-CM

## 2023-03-07 NOTE — PROGRESS NOTES
NEPHROLOGY OUTPATIENT PROGRESS NOTE   Ruben Davidson 74 y o  male MRN: [id number]  DATE: 3/7/2023  Reason for visit:   Chief Complaint   Patient presents with   • Follow-up   • Chronic Kidney Disease        Patient Instructions   1  Chronic kidney disease stage 3b in setting of prostatic adenocarcinoma as well as smoldering myeloma   - eGFR ~30ml/min per CKD EPI equation  Last sCr 2 16 as of 2/28/23  -sCr appears to be at a new baseline of 1 9-2 2 since June 2022   -continue to avoid nonsteroidals(motrin, aleve, advil, ibuprofen, toradol, naproxen, celebrex, indomethacin and meloxicam)  -renal u/s August 2017 shows echogenic kidneys with b/l renal cysts  The cause of this is unclear   -last urinalysis shows 1+protein in urine, without blood and UpCr 0 46g-proteinuria improving  -Check CMP in 3 months as well as urine protein:Cr and microalb:Cr      2  HTN - BP well controlled  Have correlated home BP cuff in past which appears to be accurate  Continue low salt diet  -on lisinopril 40mg daily, HCTZ 12 5mg daily  No longer on amlodipine 5mg daily  -Goal BP < 130/80  Call office if BP at home persistently above goal    -monitor BP twice daily  Do not check BP immediately after waking up as it tends to run higher in everyone then  Must check BP after at least 30 minutes of rest  Call office with BP readings next week  BP log provided  3  Proteinuria - noted on UA  +1 protein on UA and UpCr 0 46g - has smoldering myeloma  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, next appointment Thursday     RTC in 4 months  Repeat CMP and other hypercalcemia work up in 3 months  Marnie Fernandez was seen today for follow-up and chronic kidney disease  Diagnoses and all orders for this visit:    Stage 3b chronic kidney disease ([facility] [address] )  -     Comprehensive metabolic panel; Future  -     Vitamin D 25 hydroxy; Future  -     PTH, intact; Future  -     Angiotensin converting enzyme; Future  -     Vitamin D 1,25 dihydroxy; Future  -     Magnesium; Future  -     Phosphorus; Future    Essential hypertension    Hypercalcemia  -     Comprehensive metabolic panel; Future  -     Vitamin D 25 hydroxy; Future  -     PTH, intact; Future  -     Angiotensin converting enzyme; Future  -     Vitamin D 1,25 dihydroxy; Future  -     Magnesium; Future  -     Phosphorus; Future    Proteinuria, unspecified type        Assessment/Plan:  1  Chronic kidney disease stage 3b in setting of prostatic adenocarcinoma as well as smoldering myeloma   - eGFR ~30ml/min per CKD EPI equation  Last sCr 2 16 as of 2/28/23  -sCr appears to be at a new baseline of 1 9-2 2 since June 2022   -continue to avoid nonsteroidals(motrin, aleve, advil, ibuprofen, toradol, naproxen, celebrex, indomethacin and meloxicam)  -renal u/s August 2017 shows echogenic kidneys with b/l renal cysts  The cause of this is unclear   -last urinalysis shows 1+protein in urine, without blood and UpCr 0 46g-proteinuria improving  -Check CMP in 3 months as well as urine protein:Cr and microalb:Cr      2  HTN - BP well controlled  Have correlated home BP cuff in past which appears to be accurate  Continue low salt diet  -on lisinopril 40mg daily, HCTZ 12 5mg daily  No longer on amlodipine 5mg daily  -Goal BP < 130/80   Call office if BP at home persistently above goal    -monitor BP twice daily  Do not check BP immediately after waking up as it tends to run higher in everyone then  Must check BP after at least 30 minutes of rest  Call office with BP readings next week  BP log provided       3  Proteinuria - noted on UA  +1 protein on UA and UpCr 0 46g - has smoldering myeloma  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     -Will continue lisinopril 40mg daily  Proteinuria improving      4  Prostate cancer - Undergoing active surveillance by urology  Last PSA 3 9 - stable     5  microscopic hematuria - does not appear to be glomerular in origin and UA shows no RBCs, resolved     6  Hypercalcemia, mild  ? 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, next appointment Thursday     RTC in 4 months    Repeat CMP and other hypercalcemia work up in 3 months  SUBJECTIVE / INTERVAL HISTORY:  74 y o  male presents in follow up of CKD  Eamon Ortiz denies any recent illness/hospitalizations/medication changes since last office visit  Denies NSAID use  HTN - BP at home well controlle  Denies use calcium supplements    Review of Systems   Constitutional: Negative for chills and fever  HENT: Negative for sore throat  Eyes: Negative for visual disturbance  Respiratory: Negative for cough and shortness of breath  Cardiovascular: Negative for chest pain and leg swelling  Gastrointestinal: Negative for abdominal pain, constipation, diarrhea, nausea and vomiting  Endocrine: Negative for polyuria  Genitourinary: Negative for decreased urine volume, difficulty urinating and dysuria     Musculoskeletal: Negative for back pain and myalgias  Skin: Negative for rash  Neurological: Negative for light-headedness and numbness  Psychiatric/Behavioral: Negative for confusion  OBJECTIVE:  /74 (BP Location: Left arm, Patient Position: Sitting, Cuff Size: Large)   Ht 5' 11" (1 803 m)   Wt 83 kg (183 lb)   BMI 25 52 kg/m²  Body mass index is 25 52 kg/m²  Physical exam:  Physical Exam  Vitals reviewed  Constitutional:       General: He is not in acute distress  Appearance: He is well-developed  He is not diaphoretic  HENT:      Head: Normocephalic and atraumatic  Mouth/Throat:      Pharynx: No oropharyngeal exudate  Eyes:      General: No scleral icterus  Right eye: No discharge  Left eye: No discharge  Neck:      Thyroid: No thyromegaly  Cardiovascular:      Rate and Rhythm: Normal rate and regular rhythm  Heart sounds: Normal heart sounds  Pulmonary:      Effort: Pulmonary effort is normal       Breath sounds: Normal breath sounds  No wheezing or rales  Abdominal:      General: Bowel sounds are normal  There is no distension  Palpations: Abdomen is soft  Tenderness: There is no abdominal tenderness  Musculoskeletal:         General: Normal range of motion  Cervical back: Neck supple  Lymphadenopathy:      Cervical: No cervical adenopathy  Skin:     General: Skin is warm and dry  Findings: No rash  Neurological:      Mental Status: He is alert        Comments: awake   Psychiatric:         Behavior: Behavior normal          Medications:    Current Outpatient Medications:   •  cholecalciferol (VITAMIN D3) 1,000 units tablet, Take 1 tablet (1,000 Units total) by mouth daily, Disp: , Rfl:   •  hydrochlorothiazide (HYDRODIURIL) 12 5 mg tablet, TAKE 1 TABLET BY MOUTH EVERY DAY, Disp: 90 tablet, Rfl: 1  •  lisinopril (ZESTRIL) 40 mg tablet, TAKE 1 TABLET BY MOUTH EVERY DAY, Disp: 90 tablet, Rfl: 1  •  lovastatin (MEVACOR) 40 MG tablet, TAKE 1 TABLET BY MOUTH EVERY DAY, Disp: 90 tablet, Rfl: 0  •  Tremfya subcutaneous injection, , Disp: , Rfl:     Allergies: Allergies as of 03/07/2023 - Reviewed 03/07/2023   Allergen Reaction Noted   • Amlodipine Hives 07/23/2018   • Penicillins Hives 08/29/2017   • Fenofibrate Itching and Rash 07/10/2015   • Iodinated contrast media Rash 06/06/2022       The following portions of the patient's history were reviewed and updated as appropriate: past family history, past surgical history and problem list     Laboratory Results:  Lab Results   Component Value Date    SODIUM 140 02/28/2023    K 4 3 02/28/2023     (H) 02/28/2023    CO2 26 02/28/2023    BUN 36 (H) 02/28/2023    CREATININE 2 16 (H) 02/28/2023    GLUC 88 06/13/2022    CALCIUM 10 1 02/28/2023        Lab Results   Component Value Date     8 (H) 10/24/2022    CALCIUM 10 1 02/28/2023    CAION 5 8 (H) 11/30/2017    CAION 5 8 (H) 11/30/2017    CAION 5 8 (H) 11/30/2017    CAION 5 8 (H) 11/30/2017    PHOS 3 2 10/24/2022       Portions of the record may have been created with voice recognition software   Occasional wrong word or "sound a like" substitutions may have occurred due to the inherent limitations of voice recognition software   Read the chart carefully and recognize, using context, where substitutions have occurred

## 2023-03-07 NOTE — PATIENT INSTRUCTIONS
1  Chronic kidney disease stage 3b in setting of prostatic adenocarcinoma as well as smoldering myeloma   - eGFR ~30ml/min per CKD EPI equation  Last sCr 2 16 as of 2/28/23  -sCr appears to be at a new baseline of 1 9-2 2 since June 2022   -continue to avoid nonsteroidals(motrin, aleve, advil, ibuprofen, toradol, naproxen, celebrex, indomethacin and meloxicam)  -renal u/s August 2017 shows echogenic kidneys with b/l renal cysts  The cause of this is unclear   -last urinalysis shows 1+protein in urine, without blood and UpCr 0 46g-proteinuria improving  -Check CMP in 3 months as well as urine protein:Cr and microalb:Cr      2  HTN - BP well controlled  Have correlated home BP cuff in past which appears to be accurate  Continue low salt diet  -on lisinopril 40mg daily, HCTZ 12 5mg daily  No longer on amlodipine 5mg daily  -Goal BP < 130/80  Call office if BP at home persistently above goal    -monitor BP twice daily  Do not check BP immediately after waking up as it tends to run higher in everyone then  Must check BP after at least 30 minutes of rest  Call office with BP readings next week  BP log provided  3  Proteinuria - noted on UA  +1 protein on UA and UpCr 0 46g - has smoldering myeloma  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

## 2023-03-09 ENCOUNTER — OFFICE VISIT (OUTPATIENT)
Dept: HEMATOLOGY ONCOLOGY | Facility: CLINIC | Age: 68
End: 2023-03-09

## 2023-03-09 VITALS
WEIGHT: 192.2 LBS | SYSTOLIC BLOOD PRESSURE: 112 MMHG | TEMPERATURE: 98.3 F | BODY MASS INDEX: 26.91 KG/M2 | HEIGHT: 71 IN | HEART RATE: 92 BPM | DIASTOLIC BLOOD PRESSURE: 72 MMHG | OXYGEN SATURATION: 91 %

## 2023-03-09 DIAGNOSIS — D47.2 SMOLDERING MULTIPLE MYELOMA: Primary | ICD-10-CM

## 2023-03-09 NOTE — PROGRESS NOTES
Hematology Outpatient Follow - Up Note  Kelechi Dempsey 74 y o  male MRN: @ Encounter: [id number]        Date:  3/9/2023        Assessment/ Plan:    Kappa light chain restricted smoldering multiple myeloma bone marrow biopsy on April 2022 showed 12 to 15% plasma cells, normal FISH panel for multiple myeloma as well as cytogenetics, molecular test showed NF 1 variant of unknown significance    No evidence of anemia however creatinine is creeping up total albumin and total protein    Kappa light chain and ratio is increasing    Repeat bone marrow biopsy on 2/2023 showed 15 to 18% plasma cells no evidence of Congo red staining    24-hour urine protein of 1200 at this time I suggest strongly a kidney biopsy to rule out myeloma kidney, amyloidosis of the kidney meanwhile we will follow-up in 4 months with CBC, CMP, SPEP, free light chain and quantitative immunoglobulins        Labs and imaging studies are reviewed by ordering provider once results are available  If there are findings that need immediate attention, you will be contacted when results available  Discussing results and the implication on your healthcare is best discussed in person at your follow-up visit  HPI:    Zeus Yarbrough a 16 Z  o  seen for initial consultation 12/5/2019 at the referral of Jessi Barry DO regarding MGUS and elevated H/H        9/4/2019:  Normal quantitative immunoglobulins   Creatinine 1 61 otherwise normal CMP    Creatinine has been steadily been increasing slowly since 2016  [facility] follows with Dr Syl Forrest regarding his CKD     Hemoglobin 16 3, hematocrit 51 2, white blood cell count 8 2 with normal differential, platelet count 252      10/21/2019:  SPEP with immunofixation identified IgG kappa monoclonal band measuring 0 3 gram/deciliter      Workup for MGUS 12/2019 showed normal quantitative immunoglobulins, kappa light chain 14 7, lambda light chain 11 1, erythropoietin 5 4, negative for JAK2 mutation profile as well as MPL 1, CALR for polycythemia, hemoglobin electrophoresis normal         Does not smoke or drink      He has psoriasis followed by Dr Ray Lowery        He follows with Dr Alejandra Garcia regarding small volume [place] prostate cancer diagnosed on an initial prostate biopsy in 2017  In 2018, a surveillance biopsy again showed 3 of 12 cores with [place] prostate cancer   He is on active surveillance      Bone marrow biopsy in April 2022 showed 12-15% plasma cells, normal cytogenetics, fish panel for multiple myeloma is normal, molecular test however showed NF1 variant of unknown clinical significance  PSMA PET scan 5/2022 Variable mild radiotracer uptake in the previously noted prominent external iliac chain and right inguinal lymph nodes, nonspecific  No PSMA avid osseous lesions      6/13/22 Lymph node biopsy was inconclusive for metastatic carcinoma or lymphoma      6/2022- colonoscopy       12/2022 - MRI of the prostate -PI-RADSv2 1 Category 2 - Low (clinically significant cancer is unlikely to be present)  No extraprostatic tumor, seminal vesicle invasion, or pelvic osseous metastatic disease  Slightly decreased size of pelvic lymphadenopathy, in keeping with the reactive lymph node pathology on recent right external iliac lymph node biopsy 6/13/2022  Bone marrow biopsy on 2/21/2023 showed 15 to 18% kappa restricted plasma cell, negative for Congo red staining, 40% cellularity, persistent NF1 mutation of unknown significance  Interval History:        Previous Treatment:         Test Results:    Imaging: IR biopsy bone marrow    Result Date: 2/21/2023  Narrative: IMAGE-GUIDED BONE MARROW BIOPSY Indication:  D47 2: Monoclonal gammopathy Procedure and Findings: The procedure was performed in the prone position  1% lidocaine was used for local anesthetic and moderate sedation was administered   The right posterior inferior iliac spine was localized by fluoroscopy and the low back was prepped and draped in sterile fashion  Using fluoroscopic guidance, an 11g bone marrow needle was advanced through the cortex of the iliac spine into the marrow  Aspiration was performed and submitted to pathology for slide preparation  A core was obtained and submitted to pathology  The puncture site was cleansed and a dressing was applied  Fluoroscopy time: 0 9 min  Sedation (min) : 30 min  Impression: Impression: Fluoroscopy image guided bone marrow aspiration and core biopsy Signed, performed, and dictated by Matthew Hobson PA-C under supervision of Dr Jimena Brooks  Workstation performed: [id number]       Labs:   Lab Results   Component Value Date    WBC 9 02 02/28/2023    HGB 14 4 02/28/2023    HCT 49 0 02/28/2023    MCV 88 02/28/2023     (L) 02/28/2023     Lab Results   Component Value Date     11/30/2017     11/30/2017     11/30/2017     11/30/2017    K 4 3 02/28/2023     (H) 02/28/2023    CO2 26 02/28/2023    BUN 36 (H) 02/28/2023    CREATININE 2 16 (H) 02/28/2023    GLUF 95 02/28/2023    CALCIUM 10 1 02/28/2023    CORRECTEDCA 10 9 (H) 11/21/2022    AST 20 02/28/2023    ALT 16 02/28/2023    ALKPHOS 53 02/28/2023    PROT 7 1 11/30/2017    PROT 7 1 11/30/2017    PROT 7 1 11/30/2017    PROT 7 1 11/30/2017    BILITOT 0 5 11/30/2017    BILITOT 0 5 11/30/2017    BILITOT 0 5 11/30/2017    BILITOT 0 5 11/30/2017    EGFR 30 02/28/2023       No results found for: IRON, TIBC, FERRITIN    Lab Results   Component Value Date    WUKIOEGN19 422 08/08/2017         ROS: Review of Systems   Constitutional: Negative  Negative for appetite change, chills, diaphoresis, fatigue, fever and unexpected weight change  HENT:   Negative for hearing loss, lump/mass, mouth sores, nosebleeds, sore throat, trouble swallowing and voice change  Eyes: Negative  Negative for eye problems and icterus  Respiratory: Negative  Negative for chest tightness, cough, hemoptysis and shortness of breath      Cardiovascular: Negative for chest pain and leg swelling  Gastrointestinal: Negative for abdominal distention, abdominal pain, blood in stool, constipation, diarrhea and nausea  Endocrine: Negative  Genitourinary: Negative for dysuria, frequency, hematuria and pelvic pain  Musculoskeletal: Negative  Negative for arthralgias, back pain, flank pain, gait problem, myalgias and neck stiffness  Skin: Negative for itching and rash  Neurological: Negative for dizziness, gait problem, headaches, light-headedness, numbness and speech difficulty  Hematological: Negative for adenopathy  Does not bruise/bleed easily  Psychiatric/Behavioral: Negative for confusion, decreased concentration, depression and sleep disturbance  The patient is not nervous/anxious  Current Medications: Reviewed  Allergies: Reviewed  PMH/FH/SH:  Reviewed      Physical Exam:    Body surface area is 2 07 meters squared  Wt Readings from Last 3 Encounters:   03/09/23 87 2 kg (192 lb 3 2 oz)   03/07/23 83 kg (183 lb)   02/21/23 83 9 kg (185 lb)        Temp Readings from Last 3 Encounters:   03/09/23 98 3 °F (36 8 °C) (Temporal)   02/21/23 98 °F (36 7 °C) (Temporal)   02/01/23 (!) 97 3 °F (36 3 °C)        BP Readings from Last 3 Encounters:   03/09/23 112/72   03/07/23 122/74   02/21/23 102/58         Pulse Readings from Last 3 Encounters:   03/09/23 92   02/21/23 72   02/01/23 80        Physical Exam  Vitals reviewed  Constitutional:       General: He is not in acute distress  Appearance: He is well-developed  He is not diaphoretic  HENT:      Head: Normocephalic and atraumatic  Eyes:      Conjunctiva/sclera: Conjunctivae normal    Neck:      Trachea: No tracheal deviation  Cardiovascular:      Rate and Rhythm: Normal rate and regular rhythm  Heart sounds: No murmur heard  No friction rub  No gallop  Pulmonary:      Effort: Pulmonary effort is normal  No respiratory distress  Breath sounds: Normal breath sounds   No wheezing or rales  Chest:      Chest wall: No tenderness  Abdominal:      General: There is no distension  Palpations: Abdomen is soft  Tenderness: There is no abdominal tenderness  Musculoskeletal:      Cervical back: Normal range of motion and neck supple  Lymphadenopathy:      Cervical: No cervical adenopathy  Skin:     General: Skin is warm and dry  Coloration: Skin is not pale  Findings: No erythema  Neurological:      Mental Status: He is alert and oriented to person, place, and time  Psychiatric:         Behavior: Behavior normal          Thought Content: Thought content normal          Judgment: Judgment normal          ECO  Goals and Barriers:  Current Goal: Minimize effects of disease  Barriers: None  Patient's Capacity to Self Care:  Patient is able to self care      Code Status: [unfilled]

## 2023-03-12 LAB — MISCELLANEOUS LAB TEST RESULT: NORMAL

## 2023-03-13 DIAGNOSIS — I10 HYPERTENSION: ICD-10-CM

## 2023-03-13 RX ORDER — HYDROCHLOROTHIAZIDE 12.5 MG/1
12.5 TABLET ORAL DAILY
Qty: 90 TABLET | Refills: 3 | Status: SHIPPED | OUTPATIENT
Start: 2023-03-13

## 2023-03-14 DIAGNOSIS — E78.2 MIXED HYPERLIPIDEMIA: ICD-10-CM

## 2023-03-14 RX ORDER — LOVASTATIN 40 MG/1
TABLET ORAL
Qty: 90 TABLET | Refills: 0 | Status: SHIPPED | OUTPATIENT
Start: 2023-03-14

## 2023-03-22 ENCOUNTER — OFFICE VISIT (OUTPATIENT)
Dept: FAMILY MEDICINE CLINIC | Facility: CLINIC | Age: 68
End: 2023-03-22

## 2023-03-22 VITALS
HEIGHT: 71 IN | DIASTOLIC BLOOD PRESSURE: 72 MMHG | TEMPERATURE: 96.6 F | BODY MASS INDEX: 26.85 KG/M2 | SYSTOLIC BLOOD PRESSURE: 106 MMHG | WEIGHT: 191.8 LBS

## 2023-03-22 DIAGNOSIS — D69.6 PLATELETS DECREASED (HCC): ICD-10-CM

## 2023-03-22 DIAGNOSIS — N50.89 SCROTAL MASS: Primary | ICD-10-CM

## 2023-03-22 NOTE — PROGRESS NOTES
Name: Ed Burroughs      : 1955      MRN: [id number]  Encounter Provider: Velvet Gaucher, CRNP  Encounter Date: 3/22/2023   Encounter department: [place] [address]     1  Scrotal mass  Assessment & Plan: Mass is consistent with possible cyst   Ultrasound was ordered to confirm this  Orders:  -     US scrotum and groin area; Future; Expected date: 2023    2  Platelets decreased ([address] )  Assessment & Plan:  Patient continues to follow with hematology for this  BMI Counseling: Body mass index is 26 75 kg/m²  The BMI is above normal  Nutrition recommendations include decreasing portion sizes, encouraging healthy choices of fruits and vegetables, moderation in carbohydrate intake and increasing intake of lean protein  Exercise recommendations include exercising 3-5 times per week and obtaining a gym membership  No pharmacotherapy was ordered  Rationale for BMI follow-up plan is due to patient being overweight or obese  Depression Screening and Follow-up Plan: Patient was screened for depression during today's encounter  They screened negative with a PHQ-2 score of 0  Subjective      Mass of groin: The patient reports 4 days ago he noted a lump in his left groin area while showering  He denies any tenderness of the area, drainage, warmth to touch, or change in size since he noted the area  Patient also denies any new urinary symptoms  Platelets decreased: Patient does have a noted history of multiple myeloma and is currently having regular follow-up with hematology regarding this  Review of Systems   Constitutional: Negative for chills and fever  HENT: Negative for ear pain and sore throat  Eyes: Negative for pain and visual disturbance  Respiratory: Negative for cough, chest tightness, shortness of breath and wheezing  Cardiovascular: Negative for chest pain, palpitations and leg swelling     Gastrointestinal: Negative for abdominal pain, constipation, diarrhea, nausea and vomiting  Endocrine: Negative for cold intolerance and heat intolerance  Genitourinary: Negative for decreased urine volume, dysuria and hematuria  Musculoskeletal: Negative for arthralgias, back pain and myalgias  Skin: Negative for color change and rash  Allergic/Immunologic: Negative for environmental allergies  Neurological: Negative for dizziness, seizures, syncope, weakness, light-headedness, numbness and headaches  Hematological: Negative for adenopathy  Psychiatric/Behavioral: Negative for confusion  The patient is not nervous/anxious  All other systems reviewed and are negative  Current Outpatient Medications on File Prior to Visit   Medication Sig   • cholecalciferol (VITAMIN D3) 1,000 units tablet Take 1 tablet (1,000 Units total) by mouth daily   • hydrochlorothiazide (HYDRODIURIL) 12 5 mg tablet Take 1 tablet (12 5 mg total) by mouth daily   • lisinopril (ZESTRIL) 40 mg tablet TAKE 1 TABLET BY MOUTH EVERY DAY   • lovastatin (MEVACOR) 40 MG tablet TAKE 1 TABLET BY MOUTH EVERY DAY   • Tremfya subcutaneous injection        Objective     /72 (BP Location: Right arm, Patient Position: Sitting, Cuff Size: Adult)   Temp (!) 96 6 °F (35 9 °C) (Temporal)   Ht 5' 11" (1 803 m) Comment: on file  Wt 87 kg (191 lb 12 8 oz)   BMI 26 75 kg/m²     Physical Exam  Vitals and nursing note reviewed  Constitutional:       General: He is not in acute distress  Appearance: Normal appearance  He is not ill-appearing  HENT:      Head: Normocephalic  Eyes:      Conjunctiva/sclera: Conjunctivae normal    Cardiovascular:      Rate and Rhythm: Normal rate and regular rhythm  Pulses: Normal pulses  Carotid pulses are 2+ on the right side and 2+ on the left side  Radial pulses are 2+ on the right side and 2+ on the left side  Posterior tibial pulses are 2+ on the right side and 2+ on the left side        Heart sounds: Normal heart sounds  No murmur heard  Pulmonary:      Effort: Pulmonary effort is normal  No respiratory distress  Breath sounds: Normal breath sounds  No wheezing, rhonchi or rales  Abdominal:      General: Abdomen is flat  Bowel sounds are normal  There is no distension  Palpations: Abdomen is soft  Tenderness: There is no abdominal tenderness  There is no guarding  Genitourinary:     Comments: Small, round, marble sized, fixed, nonfluctuant, nontender mass noted on the posterior portion of the patient's left testicle near the base of the testicle  No skin changes were noted around the affected area  Musculoskeletal:         General: Normal range of motion  Cervical back: Normal range of motion  Right lower leg: No edema  Left lower leg: No edema  Skin:     General: Skin is warm and dry  Capillary Refill: Capillary refill takes less than 2 seconds  Neurological:      General: No focal deficit present  Mental Status: He is alert and oriented to person, place, and time  Psychiatric:         Mood and Affect: Mood normal          Behavior: Behavior normal          Thought Content:  Thought content normal          Judgment: Judgment normal        SCOOBY Marcos

## 2023-03-23 ENCOUNTER — HOSPITAL ENCOUNTER (OUTPATIENT)
Dept: ULTRASOUND IMAGING | Facility: HOSPITAL | Age: 68
Discharge: HOME | End: 2023-03-23

## 2023-03-23 DIAGNOSIS — N50.89 SCROTAL MASS: ICD-10-CM

## 2023-03-28 ENCOUNTER — PREP FOR PROCEDURE (OUTPATIENT)
Dept: INTERVENTIONAL RADIOLOGY/VASCULAR | Facility: CLINIC | Age: 68
End: 2023-03-28

## 2023-03-28 ENCOUNTER — TELEPHONE (OUTPATIENT)
Dept: NEPHROLOGY | Facility: HOSPITAL | Age: 68
End: 2023-03-28

## 2023-03-28 DIAGNOSIS — N18.30 HYPERTENSION ASSOCIATED WITH STAGE 3 CHRONIC KIDNEY DISEASE DUE TO TYPE 2 DIABETES MELLITUS (HCC): ICD-10-CM

## 2023-03-28 DIAGNOSIS — D47.2 SMOLDERING MULTIPLE MYELOMA: Primary | ICD-10-CM

## 2023-03-28 DIAGNOSIS — I12.9 HYPERTENSION ASSOCIATED WITH STAGE 3 CHRONIC KIDNEY DISEASE DUE TO TYPE 2 DIABETES MELLITUS (HCC): ICD-10-CM

## 2023-03-28 DIAGNOSIS — E11.22 HYPERTENSION ASSOCIATED WITH STAGE 3 CHRONIC KIDNEY DISEASE DUE TO TYPE 2 DIABETES MELLITUS (HCC): ICD-10-CM

## 2023-03-28 DIAGNOSIS — Z11.59 ENCOUNTER FOR SCREENING FOR OTHER VIRAL DISEASES: ICD-10-CM

## 2023-03-28 DIAGNOSIS — R80.9 PROTEINURIA, UNSPECIFIED TYPE: Primary | ICD-10-CM

## 2023-03-28 NOTE — TELEPHONE ENCOUNTER
The patient is willing to pursue renal biopsy to r/o myeloma kidney vs amyloid kidney  IR consult placed  INR, BMP, and other serologies ordered  All questions answered

## 2023-03-31 ENCOUNTER — TELEPHONE (OUTPATIENT)
Dept: NEPHROLOGY | Facility: CLINIC | Age: 68
End: 2023-03-31

## 2023-03-31 DIAGNOSIS — I86.1 BILATERAL VARICOCELES: Primary | ICD-10-CM

## 2023-03-31 DIAGNOSIS — L72.9 SCROTAL CYST: ICD-10-CM

## 2023-03-31 NOTE — TELEPHONE ENCOUNTER
Receive a call from patient stating he had a sonogram and the tests results came back neg  Patient was advised to see urology  Patient wants to know if he should postpone the kidney biopsy? Patient stated the biopsy and the urology appointment are all around the same time  Please advise

## 2023-04-03 ENCOUNTER — TELEPHONE (OUTPATIENT)
Dept: NEPHROLOGY | Facility: CLINIC | Age: 68
End: 2023-04-03

## 2023-04-03 ENCOUNTER — DOCUMENTATION (OUTPATIENT)
Dept: NEPHROLOGY | Facility: CLINIC | Age: 68
End: 2023-04-03

## 2023-04-07 ENCOUNTER — OFFICE VISIT (OUTPATIENT)
Dept: UROLOGY | Facility: CLINIC | Age: 68
End: 2023-04-07

## 2023-04-07 VITALS
WEIGHT: 193 LBS | SYSTOLIC BLOOD PRESSURE: 116 MMHG | BODY MASS INDEX: 27.02 KG/M2 | DIASTOLIC BLOOD PRESSURE: 78 MMHG | HEART RATE: 57 BPM | OXYGEN SATURATION: 96 % | HEIGHT: 71 IN

## 2023-04-07 DIAGNOSIS — C61 PROSTATE CANCER (HCC): Primary | ICD-10-CM

## 2023-04-07 NOTE — PROGRESS NOTES
4/7/2023    Roland Gregorio  1955  [id number]        Assessment  History of small volume Deanna 6 prostate cancer on active surveillance, history of smoldering multiple myeloma, improving retroperitoneal adenopathy, benign perineal cyst    Discussion  Provided the patient with reassurance that his cyst in the perineum is benign on examination and appears benign on ultrasound  His digital rectal examination is also benign and he can cancel his June appointment and return in 6 months with his next PSA  He will continue to follow with medical oncology regarding his multiple myeloma  He will continue with active surveillance of his small volume Deanna 6 cancer  History of Present Illness  74 y o  male with a history of Deanna 6 prostate cancer diagnosed in 2012  He has been on active surveillance since that time for over a decade  His PSA has hovered around 4 without any significant change  In April 2022 as part of active surveillance he had a multiparametric MRI of the prostate  This showed a 3 cm iliac lymph node  He had a PSMA CT PET  scan performed which showed variable radiotracer activity in this area  He subsequently went on to have a CT-guided needle biopsy in interventional radiology  Pathology was consistent with a reactive lymph node  He continues on active surveillance  He follows with oncology, Dr Emil Marks  He has been recommended to undergo a biopsy of the kidney  His most recent PSA level from December 2022 was 3 9  Today he complains of a cyst within his left groin  It has been present for just a few weeks  He believes that he just discovered it  It is painless  He had a scrotal ultrasound which reveals a cyst posterior to the scrotum measuring 1 x 1 x 1 cm consistent with physical examination findings        AUA Symptom Score      Review of Systems  Review of Systems   Constitutional: Negative  HENT: Negative  Eyes: Negative  Respiratory: Negative  Cardiovascular: Negative  Gastrointestinal: Negative  Endocrine: Negative  Genitourinary:        Per HPI   Musculoskeletal: Negative  Skin: Negative  Allergic/Immunologic: Negative  Neurological: Negative  Hematological: Negative  Psychiatric/Behavioral: Negative  Past Medical History  Past Medical History:   Diagnosis Date   • Adenocarcinoma of prostate ([facility] [address] )     Last assessed 08/08/17   • Chronic kidney disease    • Colon polyp    • Psoriasis        Past Social History  Past Surgical History:   Procedure Laterality Date   • COLONOSCOPY     • IR BIOPSY BONE MARROW  2/21/2023   • IR BIOPSY LYMPH NODE  6/13/2022   • [facility] COLONOSCOPY FLX DX W/COLLJ SPEC WHEN PFRMD N/A 08/31/2018    Procedure: COLONOSCOPY;  Surgeon: Esteban Carr MD;  Location: [facility] GI LAB; Service: Colorectal   • PROSTATE BIOPSY  02/16/2018   • TONSILLECTOMY         Past Family History  Family History   Problem Relation Age of Onset   • Arthritis Mother    • Hyperlipidemia Mother    • Hypertension Mother    • Diabetes Son        Past Social history  Social History     Socioeconomic History   • Marital status: /Civil Union     Spouse name: Not on file   • Number of children: Not on file   • Years of education: Not on file   • Highest education level: Not on file   Occupational History   • Occupation:  retired IT and finance   Tobacco Use   • Smoking status: Never   • Smokeless tobacco: Never   Vaping Use   • Vaping Use: Never used   Substance and Sexual Activity   • Alcohol use: Yes     Comment: very rarely   • Drug use: Never   • Sexual activity: Yes     Partners: Female   Other Topics Concern   • Not on file   Social History Narrative   • Not on file     Social Determinants of Health     Financial Resource Strain: Low Risk    • Difficulty of Paying Living Expenses: Not hard at all   Food Insecurity: Not on file   Transportation Needs: No Transportation Needs   • Lack of Transportation (Medical):  No   • Lack of "Transportation (Non-Medical): No   Physical Activity: Not on file   Stress: Not on file   Social Connections: Not on file   Intimate Partner Violence: Not on file   Housing Stability: Not on file       Current Medications  Current Outpatient Medications   Medication Sig Dispense Refill   • cholecalciferol (VITAMIN D3) 1,000 units tablet Take 1 tablet (1,000 Units total) by mouth daily     • hydrochlorothiazide (HYDRODIURIL) 12 5 mg tablet Take 1 tablet (12 5 mg total) by mouth daily 90 tablet 3   • lisinopril (ZESTRIL) 40 mg tablet TAKE 1 TABLET BY MOUTH EVERY DAY 90 tablet 1   • lovastatin (MEVACOR) 40 MG tablet TAKE 1 TABLET BY MOUTH EVERY DAY 90 tablet 0   • Tremfya subcutaneous injection        No current facility-administered medications for this visit  Allergies  Allergies   Allergen Reactions   • Amlodipine Hives   • Penicillins Hives   • Fenofibrate Itching and Rash   • Iodinated Contrast Media Rash       Past Medical History, Social History, Family History, medications and allergies were reviewed  Vitals  Vitals:    04/07/23 1049   BP: 116/78   BP Location: Left arm   Patient Position: Sitting   Cuff Size: Adult   Pulse: 57   SpO2: 96%   Weight: 87 5 kg (193 lb)   Height: 5' 11\" (1 803 m)       Physical Exam  Physical Exam  On examination he is in no acute distress  Gait normal   Affect normal    examination reveals normal phallus, scrotum and scrotal contents  Small right epididymal head cyst is appreciated  A 1 x 1 x 1 cm cystic structure posterior to the left testicle is appreciated and appears benign on exam   Digital rectal examination reveals a 80+ grams prostate which is soft and smooth without nodularity  Skin is warm  Extremities without edema      Results  Lab Results   Component Value Date    PSA 3 9 12/19/2022    PSA 3 8 06/28/2022    PSA 4 2 (H) 04/04/2022     Lab Results   Component Value Date    CALCIUM 10 1 02/28/2023     11/30/2017     11/30/2017     " 11/30/2017     11/30/2017    K 4 3 02/28/2023    CO2 26 02/28/2023     (H) 02/28/2023    BUN 36 (H) 02/28/2023    CREATININE 2 16 (H) 02/28/2023     Lab Results   Component Value Date    WBC 7 40 04/04/2023    HGB 14 9 04/04/2023    HCT 47 8 04/04/2023    MCV 85 04/04/2023     04/04/2023         Office Urine Dip  No results found for this or any previous visit (from the past 1 hour(s)) ]

## 2023-04-24 ENCOUNTER — APPOINTMENT (EMERGENCY)
Dept: RADIOLOGY | Facility: HOSPITAL | Age: 68
End: 2023-04-24

## 2023-04-24 ENCOUNTER — HOSPITAL ENCOUNTER (EMERGENCY)
Facility: HOSPITAL | Age: 68
Discharge: HOME | End: 2023-04-24
Attending: EMERGENCY MEDICINE | Admitting: EMERGENCY MEDICINE

## 2023-04-24 VITALS
DIASTOLIC BLOOD PRESSURE: 89 MMHG | HEART RATE: 99 BPM | RESPIRATION RATE: 15 BRPM | OXYGEN SATURATION: 98 % | HEIGHT: 71 IN | SYSTOLIC BLOOD PRESSURE: 129 MMHG | TEMPERATURE: 97.6 F | BODY MASS INDEX: 26.11 KG/M2

## 2023-04-24 DIAGNOSIS — M10.9 GOUT: ICD-10-CM

## 2023-04-24 DIAGNOSIS — M79.672 FOOT PAIN, LEFT: Primary | ICD-10-CM

## 2023-04-24 LAB
ANION GAP SERPL CALCULATED.3IONS-SCNC: 6 MMOL/L (ref 4–13)
BASOPHILS # BLD AUTO: 0.02 THOUSANDS/ΜL (ref 0–0.1)
BASOPHILS NFR BLD AUTO: 0 % (ref 0–1)
BUN SERPL-MCNC: 40 MG/DL (ref 5–25)
CALCIUM SERPL-MCNC: 10.7 MG/DL (ref 8.4–10.2)
CHLORIDE SERPL-SCNC: 105 MMOL/L (ref 96–108)
CO2 SERPL-SCNC: 27 MMOL/L (ref 21–32)
CREAT SERPL-MCNC: 2.03 MG/DL (ref 0.6–1.3)
EOSINOPHIL # BLD AUTO: 0.22 THOUSAND/ΜL (ref 0–0.61)
EOSINOPHIL NFR BLD AUTO: 3 % (ref 0–6)
ERYTHROCYTE [DISTWIDTH] IN BLOOD BY AUTOMATED COUNT: 15 % (ref 11.6–15.1)
GFR SERPL CREATININE-BSD FRML MDRD: 33 ML/MIN/1.73SQ M
GLUCOSE SERPL-MCNC: 96 MG/DL (ref 65–140)
HCT VFR BLD AUTO: 46.9 % (ref 36.5–49.3)
HGB BLD-MCNC: 14.4 G/DL (ref 12–17)
IMM GRANULOCYTES # BLD AUTO: 0.03 THOUSAND/UL (ref 0–0.2)
IMM GRANULOCYTES NFR BLD AUTO: 0 % (ref 0–2)
LYMPHOCYTES # BLD AUTO: 1.2 THOUSANDS/ΜL (ref 0.6–4.47)
LYMPHOCYTES NFR BLD AUTO: 15 % (ref 14–44)
MCH RBC QN AUTO: 26.3 PG (ref 26.8–34.3)
MCHC RBC AUTO-ENTMCNC: 30.7 G/DL (ref 31.4–37.4)
MCV RBC AUTO: 86 FL (ref 82–98)
MONOCYTES # BLD AUTO: 0.94 THOUSAND/ΜL (ref 0.17–1.22)
MONOCYTES NFR BLD AUTO: 11 % (ref 4–12)
NEUTROPHILS # BLD AUTO: 5.88 THOUSANDS/ΜL (ref 1.85–7.62)
NEUTS SEG NFR BLD AUTO: 71 % (ref 43–75)
NRBC BLD AUTO-RTO: 0 /100 WBCS
PLATELET # BLD AUTO: 196 THOUSANDS/UL (ref 149–390)
PMV BLD AUTO: 9.9 FL (ref 8.9–12.7)
POTASSIUM SERPL-SCNC: 4.3 MMOL/L (ref 3.5–5.3)
RBC # BLD AUTO: 5.48 MILLION/UL (ref 3.88–5.62)
SODIUM SERPL-SCNC: 138 MMOL/L (ref 135–147)
URATE SERPL-MCNC: 11 MG/DL (ref 3.5–8.5)
WBC # BLD AUTO: 8.29 THOUSAND/UL (ref 4.31–10.16)

## 2023-04-24 RX ORDER — ACETAMINOPHEN 325 MG/1
650 TABLET ORAL ONCE
Status: COMPLETED | OUTPATIENT
Start: 2023-04-24 | End: 2023-04-24

## 2023-04-24 RX ADMIN — ACETAMINOPHEN 325MG 650 MG: 325 TABLET ORAL at 11:27
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
## 2023-04-25 ENCOUNTER — DOCUMENTATION (OUTPATIENT)
Dept: NEPHROLOGY | Facility: CLINIC | Age: 68
End: 2023-04-25

## 2023-04-25 NOTE — PROGRESS NOTES
[facility] pathologist reported on 4/24/23 that patent's renal biopsy shows hypertensive nephrosclerosis  Currently held for EM  Await final path

## 2023-05-04 ENCOUNTER — TELEPHONE (OUTPATIENT)
Dept: OTHER | Facility: HOSPITAL | Age: 68
End: 2023-05-04

## 2023-05-05 ENCOUNTER — TELEPHONE (OUTPATIENT)
Dept: OTHER | Facility: HOSPITAL | Age: 68
End: 2023-05-05

## 2023-05-05 NOTE — TELEPHONE ENCOUNTER
Called and spoke with patient  Patient stated he is available all day today he can be reached at [phone]

## 2023-05-05 NOTE — TELEPHONE ENCOUNTER
I informed the patient of his renal biopsy results  Renal path suggests hypertensive changes  No signs of light chain/myeloma related disease

## 2023-05-09 ENCOUNTER — OFFICE VISIT (OUTPATIENT)
Dept: PODIATRY | Facility: CLINIC | Age: 68
End: 2023-05-09

## 2023-05-09 VITALS
HEART RATE: 73 BPM | SYSTOLIC BLOOD PRESSURE: 111 MMHG | WEIGHT: 187 LBS | HEIGHT: 71 IN | BODY MASS INDEX: 26.18 KG/M2 | DIASTOLIC BLOOD PRESSURE: 73 MMHG

## 2023-05-09 DIAGNOSIS — M79.675 PAIN IN LEFT TOE(S): Primary | ICD-10-CM

## 2023-05-09 DIAGNOSIS — M10.372 ACUTE GOUT DUE TO RENAL IMPAIRMENT INVOLVING TOE OF LEFT FOOT: ICD-10-CM

## 2023-05-09 RX ORDER — COLCHICINE 0.6 MG/1
0.6 TABLET ORAL 2 TIMES DAILY
Qty: 60 TABLET | Refills: 0 | Status: SHIPPED | OUTPATIENT
Start: 2023-05-09 | End: 2023-06-08

## 2023-05-09 RX ORDER — TRIAMCINOLONE ACETONIDE 40 MG/ML
10 INJECTION, SUSPENSION INTRA-ARTICULAR; INTRAMUSCULAR ONCE
Status: COMPLETED | OUTPATIENT
Start: 2023-05-09 | End: 2023-05-09

## 2023-05-09 RX ORDER — LIDOCAINE HYDROCHLORIDE 10 MG/ML
1 INJECTION, SOLUTION EPIDURAL; INFILTRATION; INTRACAUDAL; PERINEURAL ONCE
Status: COMPLETED | OUTPATIENT
Start: 2023-05-09 | End: 2023-05-09

## 2023-05-09 RX ORDER — DEXAMETHASONE SODIUM PHOSPHATE 4 MG/ML
2 INJECTION, SOLUTION INTRA-ARTICULAR; INTRALESIONAL; INTRAMUSCULAR; INTRAVENOUS; SOFT TISSUE ONCE
Status: COMPLETED | OUTPATIENT
Start: 2023-05-09 | End: 2023-05-09

## 2023-05-09 RX ORDER — BUPIVACAINE HYDROCHLORIDE 5 MG/ML
1 INJECTION, SOLUTION PERINEURAL ONCE
Status: COMPLETED | OUTPATIENT
Start: 2023-05-09 | End: 2023-05-09

## 2023-05-09 RX ADMIN — BUPIVACAINE HYDROCHLORIDE 1 ML: 5 INJECTION, SOLUTION PERINEURAL at 12:07

## 2023-05-09 RX ADMIN — LIDOCAINE HYDROCHLORIDE 1 ML: 10 INJECTION, SOLUTION EPIDURAL; INFILTRATION; INTRACAUDAL; PERINEURAL at 12:08

## 2023-05-09 RX ADMIN — DEXAMETHASONE SODIUM PHOSPHATE 2 MG: 4 INJECTION, SOLUTION INTRA-ARTICULAR; INTRALESIONAL; INTRAMUSCULAR; INTRAVENOUS; SOFT TISSUE at 12:09

## 2023-05-09 RX ADMIN — TRIAMCINOLONE ACETONIDE 10 MG: 40 INJECTION, SUSPENSION INTRA-ARTICULAR; INTRAMUSCULAR at 12:08

## 2023-05-09 NOTE — PROGRESS NOTES
"Assessment/Plan:    Pain in left toe(s)  Acute gout due to renal impairment involving toe of left foot  -     lidocaine (PF) (XYLOCAINE-MPF) 1 % injection 1 mL  -     bupivacaine (MARCAINE) 0 5 % injection 1 mL  -     triamcinolone acetonide (KENALOG-40) 40 mg/mL injection 10 mg  -     dexamethasone (DECADRON) injection 2 mg  -     colchicine (COLCRYS) 0 6 mg tablet; Take 1 tablet (0 6 mg total) by mouth 2 (two) times a day for the first 2 days and then reduce dosage to once a day after that  - Recommend patient look into a low purine diet to help facilitate his gout management in the future  - Follow-up in 4 weeks  - Call if any increased swelling/redness noted within 3 days of injection  Other orders  -     Small joint arthrocentesis: L great MTP        Small joint arthrocentesis: L great MTP  Universal Protocol:  Consent: Verbal consent obtained  Risks and benefits: risks, benefits and alternatives were discussed  Consent given by: patient  Time out: Immediately prior to procedure a \"time out\" was called to verify the correct patient, procedure, equipment, support staff and site/side marked as required  Patient understanding: patient states understanding of the procedure being performed  Patient identity confirmed: verbally with patient    Supporting Documentation  Indications: joint swelling and pain   Procedure Details  Location: great toe - L great MTP  Preparation: Patient was prepped and draped in the usual sterile fashion  Needle size: 25 G  Approach: dorsal    Patient tolerance: patient tolerated the procedure well with no immediate complications  Dressing:  Sterile dressing applied    Injection left first MPJ:   1 cc 0 5% Sensorcaine  1 cc 1% Xylocaine plain  0 5 cc dexamethasone 4 mg/mL  0 25 cc Kenalog 40        Subjective:      Patient ID: Aaron Matthews is a 74 y o  male  5/9/2023[de-identified] 68-year-old male presents with painful swollen left great toe joint    Reports changing a lawnmower tire 2 " weeks ago with his toe flexed for a period of time and then cutting grass on a steep incline with a push mower followed by the very next morning a painful red swollen left great toe joint  Went to ED had x-rays which showed no fracture  Patient then referred to podiatry  Patient has significant past medical history of CKD3b, psoriasis, hypertension, and thrombocytopenia  Lab work from ED showed creatinine of 2 03 and uric acid of 11 0 consistent with an acute gout flareup  Patient reports never having gout in the past     The following portions of the patient's history were reviewed and updated as appropriate: allergies, current medications, past family history, past medical history, past social history, past surgical history and problem list   Past Medical History:   Diagnosis Date   • Adenocarcinoma of prostate ([facility] [address] )     Last assessed 08/08/17   • Chronic kidney disease    • Colon polyp    • Gout    • Psoriasis      Current Outpatient Medications on File Prior to Visit   Medication Sig Dispense Refill   • cholecalciferol (VITAMIN D3) 1,000 units tablet Take 1 tablet (1,000 Units total) by mouth daily     • hydrochlorothiazide (HYDRODIURIL) 12 5 mg tablet Take 1 tablet (12 5 mg total) by mouth daily 90 tablet 3   • lisinopril (ZESTRIL) 40 mg tablet TAKE 1 TABLET BY MOUTH EVERY DAY 90 tablet 1   • lovastatin (MEVACOR) 40 MG tablet TAKE 1 TABLET BY MOUTH EVERY DAY 90 tablet 0   • Tremfya subcutaneous injection        No current facility-administered medications on file prior to visit  Past Surgical History:   Procedure Laterality Date   • COLONOSCOPY     • IR BIOPSY BONE MARROW  2/21/2023   • IR BIOPSY KIDNEY RANDOM  4/19/2023   • IR BIOPSY LYMPH NODE  6/13/2022   • LA COLONOSCOPY FLX DX W/COLLJ SPEC WHEN PFRMD N/A 08/31/2018    Procedure: COLONOSCOPY;  Surgeon: Angel Mckeon MD;  Location: [address] [place] [address] GI LAB;   Service: Colorectal   • PROSTATE BIOPSY  02/16/2018   • TONSILLECTOMY       Allergies   Allergen Reactions   • "Amlodipine Hives   • Penicillins Hives   • Fenofibrate Itching and Rash   • Iodinated Contrast Media Rash           Review of Systems   Constitutional: Negative for chills and fever  HENT: Negative for ear pain and sore throat  Eyes: Negative for pain and visual disturbance  Respiratory: Negative for cough and shortness of breath  Cardiovascular: Negative for chest pain and palpitations  Gastrointestinal: Negative for abdominal pain and vomiting  Genitourinary: Negative for dysuria and hematuria  Musculoskeletal: Positive for joint swelling (Left great toe joint)  Negative for arthralgias and back pain  Skin: Negative for color change and rash  Hx psoriasis   Neurological: Negative for seizures and syncope  All other systems reviewed and are negative  Objective:    /73   Pulse 73   Ht 5' 11\" (1 803 m)   Wt 84 8 kg (187 lb)   BMI 26 08 kg/m²      Physical Exam  Constitutional:       Appearance: Normal appearance  HENT:      Head: Normocephalic  Right Ear: Tympanic membrane normal       Left Ear: Tympanic membrane normal       Nose: No congestion  Mouth/Throat:      Pharynx: No oropharyngeal exudate or posterior oropharyngeal erythema  Eyes:      Extraocular Movements: Extraocular movements intact  Conjunctiva/sclera: Conjunctivae normal       Pupils: Pupils are equal, round, and reactive to light  Cardiovascular:      Rate and Rhythm: Normal rate and regular rhythm  Pulses:           Dorsalis pedis pulses are 1+ on the right side and 1+ on the left side  Posterior tibial pulses are 1+ on the right side and 1+ on the left side  Pulmonary:      Effort: Pulmonary effort is normal    Musculoskeletal:         General: Swelling and tenderness present  Left foot: Decreased range of motion  Comments: Left: Painful range of motion left first MPJ and pain with palpation  Mild calor noted from first MPJ     Feet:      Right foot:      " Protective Sensation: 10 sites tested  10 sites sensed  Skin integrity: Dry skin present  Left foot:      Protective Sensation: 10 sites tested  10 sites sensed  Skin integrity: Dry skin present  Skin:     General: Skin is warm and dry  Capillary Refill: Capillary refill takes 2 to 3 seconds  Coloration: Skin is not pale  Findings: No bruising, erythema, lesion or rash  Neurological:      General: No focal deficit present  Mental Status: He is alert  Cranial Nerves: No cranial nerve deficit  Sensory: No sensory deficit  Motor: No weakness        Gait: Gait normal       Deep Tendon Reflexes: Reflexes normal    Psychiatric:         Mood and Affect: Mood normal          Behavior: Behavior normal          Judgment: Judgment normal

## 2023-06-09 DIAGNOSIS — E78.2 MIXED HYPERLIPIDEMIA: ICD-10-CM

## 2023-06-09 RX ORDER — LOVASTATIN 40 MG/1
TABLET ORAL
Qty: 90 TABLET | Refills: 0 | Status: SHIPPED | OUTPATIENT
Start: 2023-06-09

## 2023-06-14 DIAGNOSIS — I10 HYPERTENSION, UNSPECIFIED TYPE: ICD-10-CM

## 2023-06-14 DIAGNOSIS — I10 HYPERTENSION: ICD-10-CM

## 2023-06-14 RX ORDER — LISINOPRIL 40 MG/1
40 TABLET ORAL DAILY
Qty: 90 TABLET | Refills: 1 | Status: SHIPPED | OUTPATIENT
Start: 2023-06-14

## 2023-06-14 RX ORDER — HYDROCHLOROTHIAZIDE 12.5 MG/1
12.5 TABLET ORAL DAILY
Qty: 90 TABLET | Refills: 3 | Status: SHIPPED | OUTPATIENT
Start: 2023-06-14

## 2023-06-22 ENCOUNTER — OFFICE VISIT (OUTPATIENT)
Dept: PODIATRY | Facility: CLINIC | Age: 68
End: 2023-06-22
Payer: MEDICARE

## 2023-06-22 VITALS — BODY MASS INDEX: 26.18 KG/M2 | HEIGHT: 71 IN | WEIGHT: 187 LBS

## 2023-06-22 DIAGNOSIS — M79.675 PAIN IN LEFT TOE(S): ICD-10-CM

## 2023-06-22 DIAGNOSIS — M10.372 ACUTE GOUT DUE TO RENAL IMPAIRMENT INVOLVING TOE OF LEFT FOOT: Primary | ICD-10-CM

## 2023-06-22 PROCEDURE — 99213 OFFICE O/P EST LOW 20 MIN: CPT | Performed by: PODIATRIST

## 2023-07-03 NOTE — PROGRESS NOTES
Assessment/Plan:   4/24/2023 Labs reviewed: Uric acid 11.0, creatinine 2.03  Patient advised to follow-up with his primary care for long-term management of his gout and/or/rheumatology. Follow-up as needed for any acute flareups, recommend patient avoid anti-inflammatory medications due to chronic kidney disease  Discussed diet recommendations for gout and recommended patient research such. .     Diagnoses and all orders for this visit:    Acute gout due to renal impairment involving toe of left foot    Pain in left toe(s)          Subjective:     Patient ID: Contreras Shields is a 74 y.o. male. 6/20/2023: 29-year-old male presents for follow-up painful swollen left great toe. Reports since last visit he has no more pain now coming from his left great toe joint. He has returned to all prior activities without restriction. Swelling has gone down dramatically after the injection. 5/9/2023[de-identified] 29-year-old male presents with painful swollen left great toe joint. Reports changing a lawnmower tire 2 weeks ago with his toe flexed for a period of time and then cutting grass on a steep incline with a push mower followed by the very next morning a painful red swollen left great toe joint. Went to ED had x-rays which showed no fracture. Patient then referred to podiatry. Patient has significant past medical history of CKD3b, psoriasis, hypertension, and thrombocytopenia. Lab work from ED showed creatinine of 2.03 and uric acid of 11.0 consistent with an acute gout flareup. Patient reports never having gout in the past.      Review of Systems   Constitutional: Negative. HENT: Negative. Eyes: Negative. Respiratory: Negative. Cardiovascular: Negative. Gastrointestinal: Negative. Endocrine: Negative. Genitourinary: Negative. Musculoskeletal:        Pain left great toe joint has resolved. Skin: Negative. Allergic/Immunologic: Negative. Neurological: Negative. Hematological: Negative. Psychiatric/Behavioral: Negative. Objective:     Physical Exam  Constitutional:       Appearance: Normal appearance. HENT:      Head: Normocephalic. Right Ear: Tympanic membrane normal.      Left Ear: Tympanic membrane normal.      Nose: No congestion. Mouth/Throat:      Pharynx: No oropharyngeal exudate or posterior oropharyngeal erythema. Eyes:      Conjunctiva/sclera: Conjunctivae normal.      Pupils: Pupils are equal, round, and reactive to light. Cardiovascular:      Rate and Rhythm: Normal rate and regular rhythm. Pulses: Normal pulses. Pulmonary:      Effort: Pulmonary effort is normal.   Musculoskeletal:         General: No swelling or tenderness. Comments: Resolved pain/swelling with range of motion palpation left great toe joint. Feet:      Right foot:      Protective Sensation: 10 sites tested. Left foot:      Protective Sensation: 10 sites tested. Skin:     General: Skin is warm and dry. Capillary Refill: Capillary refill takes 2 to 3 seconds. Coloration: Skin is not pale. Findings: No bruising, erythema, lesion or rash. Neurological:      General: No focal deficit present. Mental Status: He is alert. Cranial Nerves: No cranial nerve deficit. Sensory: No sensory deficit. Motor: No weakness.       Gait: Gait normal.      Deep Tendon Reflexes: Reflexes normal.   Psychiatric:         Mood and Affect: Mood normal.         Behavior: Behavior normal.         Judgment: Judgment normal.

## 2023-07-07 ENCOUNTER — APPOINTMENT (OUTPATIENT)
Dept: LAB | Facility: CLINIC | Age: 68
End: 2023-07-07
Payer: MEDICARE

## 2023-07-07 DIAGNOSIS — I10 ESSENTIAL HYPERTENSION: ICD-10-CM

## 2023-07-07 DIAGNOSIS — D47.2 SMOLDERING MULTIPLE MYELOMA: ICD-10-CM

## 2023-07-07 DIAGNOSIS — C61 ADENOCARCINOMA OF PROSTATE (HCC): ICD-10-CM

## 2023-07-07 DIAGNOSIS — E78.2 MIXED HYPERLIPIDEMIA: ICD-10-CM

## 2023-07-07 DIAGNOSIS — E83.52 HYPERCALCEMIA: ICD-10-CM

## 2023-07-07 DIAGNOSIS — N18.32 STAGE 3B CHRONIC KIDNEY DISEASE (HCC): ICD-10-CM

## 2023-07-07 LAB
25(OH)D3 SERPL-MCNC: 43.8 NG/ML (ref 30–100)
ALBUMIN SERPL BCP-MCNC: 3.6 G/DL (ref 3.5–5)
ALP SERPL-CCNC: 56 U/L (ref 46–116)
ALT SERPL W P-5'-P-CCNC: 17 U/L (ref 12–78)
ANION GAP SERPL CALCULATED.3IONS-SCNC: 5 MMOL/L
AST SERPL W P-5'-P-CCNC: 21 U/L (ref 5–45)
BASOPHILS # BLD AUTO: 0.03 THOUSANDS/ÂΜL (ref 0–0.1)
BASOPHILS NFR BLD AUTO: 0 % (ref 0–1)
BILIRUB SERPL-MCNC: 0.61 MG/DL (ref 0.2–1)
BUN SERPL-MCNC: 37 MG/DL (ref 5–25)
CALCIUM SERPL-MCNC: 9.9 MG/DL (ref 8.3–10.1)
CHLORIDE SERPL-SCNC: 108 MMOL/L (ref 96–108)
CHOLEST SERPL-MCNC: 197 MG/DL
CO2 SERPL-SCNC: 27 MMOL/L (ref 21–32)
CREAT SERPL-MCNC: 2.21 MG/DL (ref 0.6–1.3)
EOSINOPHIL # BLD AUTO: 0.28 THOUSAND/ÂΜL (ref 0–0.61)
EOSINOPHIL NFR BLD AUTO: 4 % (ref 0–6)
ERYTHROCYTE [DISTWIDTH] IN BLOOD BY AUTOMATED COUNT: 15.5 % (ref 11.6–15.1)
GFR SERPL CREATININE-BSD FRML MDRD: 30 ML/MIN/1.73SQ M
GLUCOSE P FAST SERPL-MCNC: 86 MG/DL (ref 65–99)
HCT VFR BLD AUTO: 48 % (ref 36.5–49.3)
HDLC SERPL-MCNC: 40 MG/DL
HGB BLD-MCNC: 14.3 G/DL (ref 12–17)
IMM GRANULOCYTES # BLD AUTO: 0.01 THOUSAND/UL (ref 0–0.2)
IMM GRANULOCYTES NFR BLD AUTO: 0 % (ref 0–2)
LDLC SERPL CALC-MCNC: 108 MG/DL (ref 0–100)
LYMPHOCYTES # BLD AUTO: 1.89 THOUSANDS/ÂΜL (ref 0.6–4.47)
LYMPHOCYTES NFR BLD AUTO: 26 % (ref 14–44)
MCH RBC QN AUTO: 25.6 PG (ref 26.8–34.3)
MCHC RBC AUTO-ENTMCNC: 29.8 G/DL (ref 31.4–37.4)
MCV RBC AUTO: 86 FL (ref 82–98)
MONOCYTES # BLD AUTO: 0.93 THOUSAND/ÂΜL (ref 0.17–1.22)
MONOCYTES NFR BLD AUTO: 13 % (ref 4–12)
NEUTROPHILS # BLD AUTO: 4.21 THOUSANDS/ÂΜL (ref 1.85–7.62)
NEUTS SEG NFR BLD AUTO: 57 % (ref 43–75)
NRBC BLD AUTO-RTO: 0 /100 WBCS
PLATELET # BLD AUTO: 178 THOUSANDS/UL (ref 149–390)
PMV BLD AUTO: 11.6 FL (ref 8.9–12.7)
POTASSIUM SERPL-SCNC: 4.1 MMOL/L (ref 3.5–5.3)
PROT SERPL-MCNC: 7.7 G/DL (ref 6.4–8.4)
PTH-INTACT SERPL-MCNC: 86 PG/ML (ref 12–88)
RBC # BLD AUTO: 5.59 MILLION/UL (ref 3.88–5.62)
SODIUM SERPL-SCNC: 140 MMOL/L (ref 135–147)
TRIGL SERPL-MCNC: 246 MG/DL
TSH SERPL DL<=0.05 MIU/L-ACNC: 3.04 UIU/ML (ref 0.45–4.5)
WBC # BLD AUTO: 7.35 THOUSAND/UL (ref 4.31–10.16)

## 2023-07-07 PROCEDURE — 83970 ASSAY OF PARATHORMONE: CPT

## 2023-07-07 PROCEDURE — 82784 ASSAY IGA/IGD/IGG/IGM EACH: CPT

## 2023-07-07 PROCEDURE — 84100 ASSAY OF PHOSPHORUS: CPT

## 2023-07-07 PROCEDURE — 85025 COMPLETE CBC W/AUTO DIFF WBC: CPT

## 2023-07-07 PROCEDURE — 82652 VIT D 1 25-DIHYDROXY: CPT

## 2023-07-07 PROCEDURE — 82306 VITAMIN D 25 HYDROXY: CPT

## 2023-07-07 PROCEDURE — 36415 COLL VENOUS BLD VENIPUNCTURE: CPT

## 2023-07-07 PROCEDURE — 82164 ANGIOTENSIN I ENZYME TEST: CPT

## 2023-07-07 PROCEDURE — 84443 ASSAY THYROID STIM HORMONE: CPT

## 2023-07-07 PROCEDURE — 83735 ASSAY OF MAGNESIUM: CPT

## 2023-07-07 PROCEDURE — 80053 COMPREHEN METABOLIC PANEL: CPT

## 2023-07-07 PROCEDURE — 83521 IG LIGHT CHAINS FREE EACH: CPT

## 2023-07-07 PROCEDURE — 80061 LIPID PANEL: CPT

## 2023-07-07 PROCEDURE — 84165 PROTEIN E-PHORESIS SERUM: CPT

## 2023-07-07 PROCEDURE — 86334 IMMUNOFIX E-PHORESIS SERUM: CPT

## 2023-07-08 LAB
IGA SERPL-MCNC: 145 MG/DL (ref 70–400)
IGG SERPL-MCNC: 1570 MG/DL (ref 700–1600)
IGM SERPL-MCNC: 40 MG/DL (ref 40–230)
KAPPA LC FREE SER-MCNC: 113.2 MG/L (ref 3.3–19.4)
KAPPA LC FREE/LAMBDA FREE SER: 6.62 {RATIO} (ref 0.26–1.65)
LAMBDA LC FREE SERPL-MCNC: 17.1 MG/L (ref 5.7–26.3)
MAGNESIUM SERPL-MCNC: 1.9 MG/DL (ref 1.6–2.6)
PHOSPHATE SERPL-MCNC: 3.3 MG/DL (ref 2.3–4.1)

## 2023-07-10 LAB
1,25(OH)2D3 SERPL-MCNC: 35 PG/ML (ref 24.8–81.5)
ACE SERPL-CCNC: 5 U/L (ref 14–82)

## 2023-07-11 LAB
ALBUMIN SERPL ELPH-MCNC: 4.16 G/DL (ref 3.2–5.1)
ALBUMIN SERPL ELPH-MCNC: 57.8 % (ref 48–70)
ALPHA1 GLOB SERPL ELPH-MCNC: 0.3 G/DL (ref 0.15–0.47)
ALPHA1 GLOB SERPL ELPH-MCNC: 4.1 % (ref 1.8–7)
ALPHA2 GLOB SERPL ELPH-MCNC: 0.63 G/DL (ref 0.42–1.04)
ALPHA2 GLOB SERPL ELPH-MCNC: 8.7 % (ref 5.9–14.9)
BETA GLOB ABNORMAL SERPL ELPH-MCNC: 0.39 G/DL (ref 0.31–0.57)
BETA1 GLOB SERPL ELPH-MCNC: 5.4 % (ref 4.7–7.7)
BETA2 GLOB SERPL ELPH-MCNC: 4.4 % (ref 3.1–7.9)
BETA2+GAMMA GLOB SERPL ELPH-MCNC: 0.32 G/DL (ref 0.2–0.58)
GAMMA GLOB ABNORMAL SERPL ELPH-MCNC: 1.41 G/DL (ref 0.4–1.66)
GAMMA GLOB SERPL ELPH-MCNC: 19.6 % (ref 6.9–22.3)
IGG/ALB SER: 1.37 {RATIO} (ref 1.1–1.8)
INTERPRETATION UR IFE-IMP: NORMAL
M PROTEIN 1 MFR SERPL ELPH: 10.2 %
M PROTEIN 1 SERPL ELPH-MCNC: 0.73 G/DL
PROT PATTERN SERPL ELPH-IMP: NORMAL
PROT SERPL-MCNC: 7.2 G/DL (ref 6.4–8.2)

## 2023-07-11 PROCEDURE — 86334 IMMUNOFIX E-PHORESIS SERUM: CPT | Performed by: PATHOLOGY

## 2023-07-11 PROCEDURE — 84165 PROTEIN E-PHORESIS SERUM: CPT | Performed by: PATHOLOGY

## 2023-07-12 ENCOUNTER — OFFICE VISIT (OUTPATIENT)
Dept: NEPHROLOGY | Facility: CLINIC | Age: 68
End: 2023-07-12
Payer: MEDICARE

## 2023-07-12 ENCOUNTER — RA CDI HCC (OUTPATIENT)
Dept: OTHER | Facility: HOSPITAL | Age: 68
End: 2023-07-12

## 2023-07-12 VITALS
HEIGHT: 71 IN | DIASTOLIC BLOOD PRESSURE: 80 MMHG | SYSTOLIC BLOOD PRESSURE: 120 MMHG | BODY MASS INDEX: 26.18 KG/M2 | HEART RATE: 79 BPM | WEIGHT: 187 LBS | OXYGEN SATURATION: 97 %

## 2023-07-12 DIAGNOSIS — I10 ESSENTIAL HYPERTENSION: ICD-10-CM

## 2023-07-12 DIAGNOSIS — N18.32 STAGE 3B CHRONIC KIDNEY DISEASE (HCC): Primary | ICD-10-CM

## 2023-07-12 DIAGNOSIS — R31.29 MICROSCOPIC HEMATURIA: ICD-10-CM

## 2023-07-12 DIAGNOSIS — E83.52 HYPERCALCEMIA: ICD-10-CM

## 2023-07-12 DIAGNOSIS — M10.372 ACUTE GOUT DUE TO RENAL IMPAIRMENT INVOLVING TOE OF LEFT FOOT: ICD-10-CM

## 2023-07-12 DIAGNOSIS — D47.2 SMOLDERING MULTIPLE MYELOMA: ICD-10-CM

## 2023-07-12 DIAGNOSIS — R80.8 OTHER PROTEINURIA: ICD-10-CM

## 2023-07-12 PROCEDURE — 99214 OFFICE O/P EST MOD 30 MIN: CPT | Performed by: INTERNAL MEDICINE

## 2023-07-12 RX ORDER — CLOBETASOL PROPIONATE 0.5 MG/G
CREAM TOPICAL
COMMUNITY
Start: 2023-05-02

## 2023-07-12 NOTE — PROGRESS NOTES
NEPHROLOGY OUTPATIENT PROGRESS NOTE   Shayla Jurado 74 y.o. male MRN: [id number]  DATE: 7/12/2023  Reason for visit:   Chief Complaint   Patient presents with   • Follow-up   • Chronic Kidney Disease        Patient Instructions   1. Chronic kidney disease stage 3b in setting of prostatic adenocarcinoma as well as smoldering myeloma, CKD likely d/t biopsy proven(4/19/23) hypertensive arterionephrosclerosis  - eGFR ~30ml/min per CKD EPI equation. Last sCr 2.21 as of 7/7/23. -sCr appears to be at a new baseline of 1.9-2.2 since June 2022. Remains stable.   -continue to avoid nonsteroidals(motrin, aleve, advil, ibuprofen, toradol, naproxen, celebrex, indomethacin and meloxicam)  -renal u/s August 2017 shows echogenic kidneys with b/l renal cysts. The cause of this is unclear.  -last urinalysis shows 1+protein in urine, without blood and UpCr 0.46g-proteinuria improving  -Check BMP in 3 months as well as urine protein:Cr and microalb:Cr   -will refer to CKD education class     2. HTN - BP well controlled. Have correlated home BP cuff in past which appears to be accurate. Continue low salt diet. -on lisinopril 40mg daily, HCTZ 12.5mg daily. No longer on amlodipine 5mg daily. -Goal BP < 130/80. Call office if BP at home persistently above goal.      3. Proteinuria - noted on UA. +1 protein on UA and UpCr 0.46g - has smoldering myeloma. 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    -Will continue lisinopril 40mg daily. Repeat UpCr and microalb:Cr     4. Prostate cancer - Undergoing active surveillance by urology. Last PSA 3.9 - stable     5. microscopic hematuria - does not appear to be glomerular in origin and UA shows no RBCs, resolved     6. Hypercalcemia, mild  ? D/t monoclonal gammopathy vs prostate ca vs primary hyperparathyroidism - improved, corrected calcium 10.3. On cholecalciferol 1000 units daily.  Last PTH normal. VIt D normal.      7. 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 Gout - had acute episode, resolved after steroid shot, had uric acid level of 11 in April 2023. Repeat uric acid. 9. myalgias- on statin, will check CK level    RTC in 4 months. Ross Primrose was seen today for follow-up and chronic kidney disease. Diagnoses and all orders for this visit:    Stage 3b chronic kidney disease ([address])  -     Basic metabolic panel; Future  -     Urinalysis with microscopic; Future  -     Protein / creatinine ratio, urine; Future  -     Albumin / creatinine urine ratio; Future  -     Ambulatory Referral to CKD Education Program; Future  -     Uric acid; Future  -     CK; Future    Essential hypertension    Acute gout due to renal impairment involving toe of left foot  -     Uric acid; Future    Microscopic hematuria    Hypercalcemia  -     Basic metabolic panel; Future    Other proteinuria  -     Protein / creatinine ratio, urine; Future  -     Albumin / creatinine urine ratio; Future    Smoldering multiple myeloma        Assessment/Plan:  1. Chronic kidney disease stage 3b in setting of prostatic adenocarcinoma as well as smoldering myeloma, CKD likely d/t biopsy proven(4/19/23) hypertensive arterionephrosclerosis  - eGFR ~30ml/min per CKD EPI equation. Last sCr 2.21 as of 7/7/23. -sCr appears to be at a new baseline of 1.9-2.2 since June 2022. Remains stable.   -continue to avoid nonsteroidals(motrin, aleve, advil, ibuprofen, toradol, naproxen, celebrex, indomethacin and meloxicam)  -renal u/s August 2017 shows echogenic kidneys with b/l renal cysts. The cause of this is unclear.  -last urinalysis shows 1+protein in urine, without blood and UpCr 0.46g-proteinuria improving  -Check BMP in 3 months as well as urine protein:Cr and microalb:Cr   -will refer to CKD education class     2. HTN - BP well controlled. Have correlated home BP cuff in past which appears to be accurate. Continue low salt diet. -on lisinopril 40mg daily, HCTZ 12.5mg daily.  No longer on amlodipine 5mg daily. -Goal BP < 130/80. Call office if BP at home persistently above goal.      3. Proteinuria - noted on UA. +1 protein on UA and UpCr 0.46g - has smoldering myeloma. 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    -Will continue lisinopril 40mg daily. Repeat UpCr and microalb:Cr     4. Prostate cancer - Undergoing active surveillance by urology. Last PSA 3.9 - stable     5. microscopic hematuria - does not appear to be glomerular in origin and UA shows no RBCs, resolved     6. Hypercalcemia, mild  ? D/t monoclonal gammopathy vs prostate ca vs primary hyperparathyroidism - improved, corrected calcium 10.3.  On cholecalciferol 1000 units daily. Last PTH normal. VIt D normal.      7. 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 Gout - had acute episode, resolved after steroid shot, had uric acid level of 11 in April 2023. Repeat uric acid. 9. myalgias- on statin, will check CK level    RTC in 4 months.     SUBJECTIVE / INTERVAL HISTORY:  74 y.o. male presents in follow up of CKD. Hola Zuluaga denies any recent illness/hospitalizations/medication changes since last office visit. Had been to the ER for foot pain due to high uric acid. Denies NSAID use. Had gout attack in April 2023. BP has been well controlled. Review of Systems   Constitutional: Negative for chills and fever. HENT: Negative for sore throat. Eyes: Negative for visual disturbance. Respiratory: Negative for cough and shortness of breath. Cardiovascular: Negative for chest pain and leg swelling. Gastrointestinal: Negative for abdominal pain, constipation, diarrhea, nausea and vomiting. Endocrine: Negative for polyuria. Genitourinary: Negative for decreased urine volume, difficulty urinating, dysuria and hematuria. Musculoskeletal: Positive for myalgias (with walking sometimes). Negative for back pain. Skin: Negative for rash. Neurological: Positive for dizziness (when hot). Negative for light-headedness and numbness. Psychiatric/Behavioral: Negative for confusion. OBJECTIVE:  /80 (BP Location: Left arm, Patient Position: Sitting, Cuff Size: Adult)   Pulse 79   Ht 5' 11" (1.803 m)   Wt 84.8 kg (187 lb)   SpO2 97%   BMI 26.08 kg/m²  Body mass index is 26.08 kg/m². Physical exam:  Physical Exam  Vitals reviewed. Constitutional:       General: He is not in acute distress. Appearance: Normal appearance. He is well-developed. He is not diaphoretic. HENT:      Head: Normocephalic and atraumatic. Nose: Nose normal.      Mouth/Throat:      Mouth: Mucous membranes are dry. Pharynx: No oropharyngeal exudate. Eyes:      General: No scleral icterus. Right eye: No discharge. Left eye: No discharge. Comments: eyeglasses   Neck:      Thyroid: No thyromegaly. Cardiovascular:      Rate and Rhythm: Normal rate and regular rhythm. Heart sounds: Normal heart sounds. Pulmonary:      Effort: Pulmonary effort is normal.      Breath sounds: Normal breath sounds. No wheezing or rales. Abdominal:      General: Bowel sounds are normal. There is no distension. Palpations: Abdomen is soft. Tenderness: There is no abdominal tenderness. Musculoskeletal:         General: No swelling. Normal range of motion. Cervical back: Neck supple. Lymphadenopathy:      Cervical: No cervical adenopathy. Skin:     General: Skin is warm and dry. Findings: No rash. Neurological:      General: No focal deficit present. Mental Status: He is alert.       Comments: awake   Psychiatric:         Mood and Affect: Mood normal.         Behavior: Behavior normal.         Medications:    Current Outpatient Medications:   •  cholecalciferol (VITAMIN D3) 1,000 units tablet, Take 1 tablet (1,000 Units total) by mouth daily, Disp: , Rfl:   •  clobetasol (TEMOVATE) 0.05 % cream, , Disp: , Rfl:   • hydrochlorothiazide (HYDRODIURIL) 12.5 mg tablet, Take 1 tablet (12.5 mg total) by mouth daily, Disp: 90 tablet, Rfl: 3  •  lisinopril (ZESTRIL) 40 mg tablet, Take 1 tablet (40 mg total) by mouth daily, Disp: 90 tablet, Rfl: 1  •  lovastatin (MEVACOR) 40 MG tablet, TAKE 1 TABLET BY MOUTH EVERY DAY, Disp: 90 tablet, Rfl: 0  •  Tremfya subcutaneous injection, , Disp: , Rfl:     Allergies: Allergies as of 07/12/2023 - Reviewed 07/12/2023   Allergen Reaction Noted   • Amlodipine Hives 07/23/2018   • Penicillins Hives 08/29/2017   • Fenofibrate Itching and Rash 07/10/2015   • Iodinated contrast media Rash 06/06/2022       The following portions of the patient's history were reviewed and updated as appropriate: past family history, past surgical history and problem list.    Laboratory Results:  Lab Results   Component Value Date    SODIUM 140 07/07/2023    K 4.1 07/07/2023     07/07/2023    CO2 27 07/07/2023    BUN 37 (H) 07/07/2023    CREATININE 2.21 (H) 07/07/2023    GLUC 96 04/24/2023    CALCIUM 9.9 07/07/2023        Lab Results   Component Value Date    PTH 86.0 07/07/2023    CALCIUM 9.9 07/07/2023    CAION 5.8 (H) 11/30/2017    CAION 5.8 (H) 11/30/2017    CAION 5.8 (H) 11/30/2017    CAION 5.8 (H) 11/30/2017    PHOS 3.3 07/07/2023       Portions of the record may have been created with voice recognition software.  Occasional wrong word or "sound a like" substitutions may have occurred due to the inherent limitations of voice recognition software.  Read the chart carefully and recognize, using context, where substitutions have occurred.

## 2023-07-12 NOTE — PROGRESS NOTES
[address] [facility] coding opportunities       Chart reviewed, no opportunity found: CHART REVIEWED, NO OPPORTUNITY FOUND        Patients Insurance     Medicare Insurance: Medicare

## 2023-07-12 NOTE — PATIENT INSTRUCTIONS
1. Chronic kidney disease stage 3b in setting of prostatic adenocarcinoma as well as smoldering myeloma, CKD likely d/t biopsy proven(4/19/23) hypertensive arterionephrosclerosis  - eGFR ~30ml/min per CKD EPI equation. Last sCr 2.21 as of 7/7/23. -sCr appears to be at a new baseline of 1.9-2.2 since June 2022. Remains stable.   -continue to avoid nonsteroidals(motrin, aleve, advil, ibuprofen, toradol, naproxen, celebrex, indomethacin and meloxicam)  -renal u/s August 2017 shows echogenic kidneys with b/l renal cysts. The cause of this is unclear.  -last urinalysis shows 1+protein in urine, without blood and UpCr 0.46g-proteinuria improving  -Check BMP in 3 months as well as urine protein:Cr and microalb:Cr   -will refer to CKD education class     2. HTN - BP well controlled. Have correlated home BP cuff in past which appears to be accurate. Continue low salt diet. -on lisinopril 40mg daily, HCTZ 12.5mg daily. No longer on amlodipine 5mg daily. -Goal BP < 130/80. Call office if BP at home persistently above goal.      3. Proteinuria - noted on UA. +1 protein on UA and UpCr 0.46g - has smoldering myeloma. 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    -Will continue lisinopril 40mg daily. Repeat UpCr and microalb:Cr     4. Prostate cancer - Undergoing active surveillance by urology. Last PSA 3.9 - stable     5. microscopic hematuria - does not appear to be glomerular in origin and UA shows no RBCs, resolved     6. Hypercalcemia, mild  ? D/t monoclonal gammopathy vs prostate ca vs primary hyperparathyroidism - improved, corrected calcium 10.3. On cholecalciferol 1000 units daily. Last PTH normal. VIt D normal.      7. 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 Gout - had acute episode, resolved after steroid shot, had uric acid level of 11 in April 2023. Repeat uric acid. 9. myalgias- on statin, will check CK level    RTC in 4 months.

## 2023-07-13 ENCOUNTER — OFFICE VISIT (OUTPATIENT)
Dept: HEMATOLOGY ONCOLOGY | Facility: CLINIC | Age: 68
End: 2023-07-13
Payer: MEDICARE

## 2023-07-13 VITALS
RESPIRATION RATE: 18 BRPM | SYSTOLIC BLOOD PRESSURE: 130 MMHG | HEART RATE: 81 BPM | HEIGHT: 71 IN | WEIGHT: 187 LBS | DIASTOLIC BLOOD PRESSURE: 70 MMHG | BODY MASS INDEX: 26.18 KG/M2 | TEMPERATURE: 97.9 F | OXYGEN SATURATION: 100 %

## 2023-07-13 DIAGNOSIS — N18.32 STAGE 3B CHRONIC KIDNEY DISEASE (HCC): ICD-10-CM

## 2023-07-13 DIAGNOSIS — D47.2 SMOLDERING MULTIPLE MYELOMA: Primary | ICD-10-CM

## 2023-07-13 PROCEDURE — 99215 OFFICE O/P EST HI 40 MIN: CPT | Performed by: PHYSICIAN ASSISTANT

## 2023-07-13 NOTE — PROGRESS NOTES
Hematology/Oncology Outpatient Follow- up Note  Dustin Kovacs 74 y.o. 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 No evidence of anemia, hypercalcemia, normal. total albumin and total protein     Kappa light chain and ratio is increasing. Repeat bone marrow biopsy on 2/2023 showed 15 to 18% plasma cells no evidence of Congo red staining         CKD. 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 no evidence of light chain amyloidosis, LC deposition disease, LC cast nephropathy or LC proximal tubular apathy. Will continue close monitoring. F/U 3 months with repeat labs. HPI:   Dustin Kovacs is a 74 y.o. seen for initial consultation 12/5/2019 at the referral of Keith Noriega DO regarding MGUS and elevated H/H.       9/4/2019:  Normal quantitative immunoglobulins. Creatinine 1.61 otherwise normal CMP. Creatinine has been steadily been increasing slowly since 2016. He follows with Dr. John Martinez regarding his CKD. Hemoglobin 16.3, hematocrit 51.2, white blood cell count 8.2 with normal differential, platelet count 729      10/21/2019:  SPEP with immunofixation identified IgG kappa monoclonal band measuring 0.3 gram/deciliter      Workup for MGUS 12/2019 showed normal quantitative immunoglobulins, kappa light chain 14.7, lambda light chain 11.1, erythropoietin 5.4, negative for JAK2 mutation profile as well as MPL 1, CALR for polycythemia, hemoglobin electrophoresis normal.        Does not smoke or drink.      He has psoriasis followed by  [facility]. He follows with Dr. Charles Díaz regarding small volume Deanna 6 prostate cancer diagnosed on an initial prostate biopsy in 2017. In 2018, a surveillance biopsy again showed 3 of 12 cores with Deanna 6 prostate cancer. He is on active surveillance. Bone marrow biopsy in April 2022 showed 12-15% plasma cells, normal cytogenetics, fish panel for multiple myeloma is normal, molecular test however showed NF1 variant of unknown clinical significance. PSMA PET scan 5/2022 Variable mild radiotracer uptake in the previously noted prominent external iliac chain and right inguinal lymph nodes, nonspecific. No PSMA avid osseous lesions. 6/13/22 Lymph node biopsy was inconclusive for metastatic carcinoma or lymphoma. 6/2022- colonoscopy. 12/2022 - MRI of the prostate -PI-RADSv2.1 Category 2 - Low (clinically significant cancer is unlikely to be present). No extraprostatic tumor, seminal vesicle invasion, or pelvic osseous metastatic disease. Slightly decreased size of pelvic lymphadenopathy, in keeping with the reactive lymph node pathology on recent right external iliac lymph node biopsy 6/13/2022. Bone marrow biopsy on 2/21/2023 showed 15 to 18% kappa restricted plasma cell, negative for Congo red staining, 40% cellularity, persistent NF1 mutation of unknown significance       M protein (IgG Kappa) IgG Kappa Ratio    6/2022 0.5 1330 62 3.7    10/22 0.63 1440 91 5.6    2/23 0.67 1460 98 4.8    4/23 0.81 -------- 102 5.3    7/23 0.73 1570 113 6.6              No evidence of anemia, hypercalcemia. Total protein and albumin remain normal.      Cr  1.5   2018 -2021  2- 2.2 in 2022     Interval History:     April 19, 2023 kidney biopsy focal global glomerulosclerosis with ischemic glomerular changes and moderate to severe arteriosclerosis most consistent with changes secondary to hypertensive arterionephrosclerosis.   There is no evidence of light chain amyloidosis, LC deposition disease, LC cast nephropathy or LC proximal tubular apathy. Findings most consistent with changes secondary to hypertensive arterionephrosclerosis. Spending Quality time with his 1year-old grandson    Feeling well. Review of Systems   Constitutional: Negative for appetite change, chills, diaphoresis, fatigue, fever and unexpected weight change. HENT:   Negative for mouth sores, nosebleeds, sore throat, tinnitus and voice change. Eyes: Negative for eye problems. Respiratory: Negative for chest tightness, cough, shortness of breath and wheezing. Cardiovascular: Negative for chest pain, leg swelling and palpitations. Gastrointestinal: Negative for abdominal distention, abdominal pain, blood in stool, constipation, diarrhea, nausea, rectal pain and vomiting. Endocrine: Negative for hot flashes. Genitourinary: Negative. Musculoskeletal: Negative for gait problem and myalgias. Skin: Negative for itching and rash. Neurological: Negative for dizziness, gait problem, headaches, light-headedness and numbness. Hematological: Negative for adenopathy. Psychiatric/Behavioral: Negative for confusion and sleep disturbance. The patient is not nervous/anxious.          Test Results:        Labs:   Lab Results   Component Value Date    HGB 14.3 07/07/2023    HCT 48.0 07/07/2023    MCV 86 07/07/2023     07/07/2023    WBC 7.35 07/07/2023    NRBC 0 07/07/2023     Lab Results   Component Value Date     11/30/2017     11/30/2017     11/30/2017     11/30/2017    K 4.1 07/07/2023     07/07/2023    CO2 27 07/07/2023    BUN 37 (H) 07/07/2023    CREATININE 2.21 (H) 07/07/2023    GLUF 86 07/07/2023    CALCIUM 9.9 07/07/2023    CORRECTEDCA 10.9 (H) 11/21/2022    AST 21 07/07/2023    ALT 17 07/07/2023    ALKPHOS 56 07/07/2023    PROT 7.1 11/30/2017    PROT 7.1 11/30/2017    PROT 7.1 11/30/2017    PROT 7.1 11/30/2017    BILITOT 0.5 11/30/2017    BILITOT 0.5 11/30/2017    BILITOT 0.5 11/30/2017    BILITOT 0.5 11/30/2017    EGFR 30 07/07/2023           Imaging: No results found. Allergies: Allergies   Allergen Reactions   • Amlodipine Hives   • Penicillins Hives   • Fenofibrate Itching and Rash   • Iodinated Contrast Media Rash     Current Medications: Reviewed  PMH/FH/SH:  Reviewed      Physical Exam:    There is no height or weight on file to calculate BSA. Ht Readings from Last 3 Encounters:   07/12/23 5' 11" (1.803 m)   06/22/23 5' 11" (1.803 m)   05/09/23 5' 11" (1.803 m)        Wt Readings from Last 3 Encounters:   07/12/23 84.8 kg (187 lb)   06/22/23 84.8 kg (187 lb)   05/09/23 84.8 kg (187 lb)        Temp Readings from Last 3 Encounters:   04/24/23 97.6 °F (36.4 °C) (Temporal)   04/19/23 98.4 °F (36.9 °C) (Temporal)   03/22/23 (!) 96.6 °F (35.9 °C) (Temporal)        BP Readings from Last 3 Encounters:   07/12/23 120/80   05/09/23 111/73   04/24/23 129/89             Physical Exam  Vitals reviewed. Constitutional:       General: He is not in acute distress. Appearance: He is well-developed. He is not diaphoretic. HENT:      Head: Normocephalic and atraumatic. Eyes:      Conjunctiva/sclera: Conjunctivae normal.   Neck:      Trachea: No tracheal deviation. Cardiovascular:      Rate and Rhythm: Normal rate and regular rhythm. Heart sounds: No murmur heard. No friction rub. No gallop. Pulmonary:      Effort: Pulmonary effort is normal. No respiratory distress. Breath sounds: Normal breath sounds. No wheezing or rales. Chest:      Chest wall: No tenderness. Abdominal:      General: There is no distension. Palpations: Abdomen is soft. Tenderness: There is no abdominal tenderness. Musculoskeletal:      Cervical back: Normal range of motion and neck supple. Lymphadenopathy:      Cervical: No cervical adenopathy. Skin:     General: Skin is warm and dry. Coloration: Skin is not pale. Findings: No erythema.    Neurological: Mental Status: He is alert and oriented to person, place, and time. Psychiatric:         Behavior: Behavior normal.         Thought Content:  Thought content normal.         Judgment: Judgment normal.         ECO    Emergency Contacts:    Extended Emergency Contact Information  Primary Emergency Contact: [address] Phone: [phone]  Mobile Phone: [phone]  Relation: Spouse

## 2023-08-01 ENCOUNTER — OFFICE VISIT (OUTPATIENT)
Dept: FAMILY MEDICINE CLINIC | Facility: CLINIC | Age: 68
End: 2023-08-01
Payer: MEDICARE

## 2023-08-01 VITALS
HEIGHT: 71 IN | OXYGEN SATURATION: 97 % | HEART RATE: 70 BPM | DIASTOLIC BLOOD PRESSURE: 60 MMHG | BODY MASS INDEX: 26.04 KG/M2 | WEIGHT: 186 LBS | SYSTOLIC BLOOD PRESSURE: 130 MMHG

## 2023-08-01 DIAGNOSIS — I10 ESSENTIAL HYPERTENSION: Primary | ICD-10-CM

## 2023-08-01 DIAGNOSIS — N18.32 STAGE 3B CHRONIC KIDNEY DISEASE (HCC): ICD-10-CM

## 2023-08-01 DIAGNOSIS — C61 PROSTATE CANCER (HCC): ICD-10-CM

## 2023-08-01 DIAGNOSIS — D47.2 SMOLDERING MULTIPLE MYELOMA: ICD-10-CM

## 2023-08-01 DIAGNOSIS — L40.9 PSORIASIS: ICD-10-CM

## 2023-08-01 DIAGNOSIS — E78.2 MIXED HYPERLIPIDEMIA: ICD-10-CM

## 2023-08-01 DIAGNOSIS — R25.2 MUSCLE CRAMP: ICD-10-CM

## 2023-08-01 PROBLEM — N18.2 STAGE 2 CHRONIC KIDNEY DISEASE: Status: RESOLVED | Noted: 2017-08-29 | Resolved: 2023-08-01

## 2023-08-01 PROCEDURE — 99214 OFFICE O/P EST MOD 30 MIN: CPT | Performed by: NURSE PRACTITIONER

## 2023-08-01 RX ORDER — VITAMIN B COMPLEX
1 TABLET ORAL DAILY
COMMUNITY
Start: 2023-08-01

## 2023-08-01 RX ORDER — MULTIVITAMIN WITH IRON
1 TABLET ORAL 2 TIMES DAILY
Refills: 0 | COMMUNITY
Start: 2023-08-01

## 2023-08-01 RX ORDER — CHLORAL HYDRATE 500 MG
1000 CAPSULE ORAL DAILY
Refills: 0 | COMMUNITY
Start: 2023-08-01

## 2023-08-01 NOTE — ASSESSMENT & PLAN NOTE
Encouraged patient to increase water intake and do stretching and exercise. w can try starting magnesium, fish oil and coq10 for support as well. Would not recommend decreasing statin as he been on this a while and LDL is not at goal given risk.    He also recently started hctz which could be the cause here recent electrolytes were normal

## 2023-08-01 NOTE — PROGRESS NOTES
Name: Cruzito Robison      : 1955      MRN: [id number]  Encounter Provider: SCOOBY Yun  Encounter Date: 2023   Encounter department: [facility] PRIMARY CARE    Assessment & Plan     1. Essential hypertension  Assessment & Plan:  Patient blood pressure is well control he continue in on lisinopril 40 and hctz 12.5      2. Stage 3b chronic kidney disease [facility])  Assessment & Plan:  Lab Results   Component Value Date    EGFR 30 2023    EGFR 33 2023    EGFR 30 2023    CREATININE 2.21 (H) 2023    CREATININE 2.03 (H) 2023    CREATININE 2.16 (H) 2023     Slight bump in creatintine near baseline. Follows with neprhrology       3. Psoriasis  Assessment & Plan: On tremfya and follows with dermatology      4. Mixed hyperlipidemia  Assessment & Plan:  Currently on lovastatin 40mg LDL near goal   continue dame medication     Orders:  -     Omega-3 Fatty Acids (fish oil) 1,000 mg; Take 1 capsule (1,000 mg total) by mouth daily    5. Smoldering multiple myeloma  Assessment & Plan:  Stable and follows with hematology       6. Muscle cramp  Assessment & Plan:  Encouraged patient to increase water intake and do stretching and exercise. w can try starting magnesium, fish oil and coq10 for support as well. Would not recommend decreasing statin as he been on this a while and LDL is not at goal given risk. He also recently started hctz which could be the cause here recent electrolytes were normal     Orders:  -     Magnesium 250 MG TABS; Take 1 tablet (250 mg total) by mouth 2 (two) times a day  -     Coenzyme Q10 (CoQ10) 100 MG CAPS; Take 1 capsule (100 mg total) by mouth in the morning  -     Omega-3 Fatty Acids (fish oil) 1,000 mg; Take 1 capsule (1,000 mg total) by mouth daily    7. Prostate cancer [facility])  Assessment & Plan:  Stable and follows with urology              Subjective      Patient presents today for follow up.    His labs are available for review he has been getting muscle cramps with walking. this started aboiut 2 months ago. He feels within the jorden 15 minutes he feels the cramping can rest and then goes back to work and then resolves. feels this in his bilateral thighs and then he ok for the rest of the day. he is concerne rashel taking lovasatin which he been on for a while     was recently started on hctz     Review of Systems   Constitutional: Negative for unexpected weight change. Respiratory: Negative for chest tightness and shortness of breath. Cardiovascular: Negative for chest pain, palpitations and leg swelling. Genitourinary: Negative for difficulty urinating and hematuria. Musculoskeletal: Positive for myalgias. Neurological: Negative for dizziness and headaches. Current Outpatient Medications on File Prior to Visit   Medication Sig   • cholecalciferol (VITAMIN D3) 1,000 units tablet Take 1 tablet (1,000 Units total) by mouth daily   • clobetasol (TEMOVATE) 0.05 % cream    • hydrochlorothiazide (HYDRODIURIL) 12.5 mg tablet Take 1 tablet (12.5 mg total) by mouth daily   • lisinopril (ZESTRIL) 40 mg tablet Take 1 tablet (40 mg total) by mouth daily   • lovastatin (MEVACOR) 40 MG tablet TAKE 1 TABLET BY MOUTH EVERY DAY   • Tremfya subcutaneous injection        Objective     /60 (BP Location: Left arm, Patient Position: Sitting, Cuff Size: Standard)   Pulse 70   Ht 5' 11" (1.803 m)   Wt 84.4 kg (186 lb)   SpO2 97%   BMI 25.94 kg/m²     Physical Exam  Vitals and nursing note reviewed. Constitutional:       Appearance: Normal appearance. He is well-developed. He is not ill-appearing. HENT:      Head: Normocephalic and atraumatic. Eyes:      Extraocular Movements: Extraocular movements intact. Conjunctiva/sclera: Conjunctivae normal.      Pupils: Pupils are equal.   Cardiovascular:      Rate and Rhythm: Normal rate and regular rhythm.       Heart sounds: S1 normal and S2 normal. No murmur heard.  Pulmonary:      Effort: Pulmonary effort is normal. No respiratory distress. Breath sounds: Normal breath sounds. Musculoskeletal:      Right hip: No tenderness, bony tenderness or crepitus. Decreased range of motion (sight at hip flexor ). Normal strength. Left hip: No tenderness, bony tenderness or crepitus. Normal range of motion. Normal strength. Neurological:      Mental Status: He is alert and oriented to person, place, and time. Psychiatric:         Mood and Affect: Mood normal.         Thought Content:  Thought content normal.       Yesenia Huber

## 2023-08-01 NOTE — ASSESSMENT & PLAN NOTE
Lab Results   Component Value Date    EGFR 30 07/07/2023    EGFR 33 04/24/2023    EGFR 30 02/28/2023    CREATININE 2.21 (H) 07/07/2023    CREATININE 2.03 (H) 04/24/2023    CREATININE 2.16 (H) 02/28/2023     Slight bump in creatintine near baseline.  Follows with neprhrology

## 2023-09-06 DIAGNOSIS — E78.2 MIXED HYPERLIPIDEMIA: ICD-10-CM

## 2023-09-06 RX ORDER — LOVASTATIN 40 MG/1
TABLET ORAL
Qty: 90 TABLET | Refills: 1 | Status: SHIPPED | OUTPATIENT
Start: 2023-09-06

## 2023-09-25 ENCOUNTER — APPOINTMENT (OUTPATIENT)
Dept: LAB | Facility: CLINIC | Age: 68
End: 2023-09-25
Payer: MEDICARE

## 2023-09-25 DIAGNOSIS — C61 PROSTATE CANCER (HCC): ICD-10-CM

## 2023-09-25 LAB — PSA SERPL-MCNC: 6.35 NG/ML (ref 0–4)

## 2023-09-25 PROCEDURE — 84153 ASSAY OF PSA TOTAL: CPT

## 2023-09-25 PROCEDURE — 36415 COLL VENOUS BLD VENIPUNCTURE: CPT

## 2023-10-03 NOTE — TELEPHONE ENCOUNTER
Called and spoke to patient  Patient aware The urology consult is for a cyst in his right testicle  Em Duran also has bilateral varicoceles which is enlargement of the veins of the scrotum   A urology referral was placed for follow-up regarding these findings  He should still have the renal biopsy due to protein in his urine and elevation in serum Cr levels in the setting of smoldering myeloma  We need to rule out myeloma kidney  this @ Cards/PCP appts. Emphasized importance of HFU appt needed within 7 days. Has all appts scheduled-PCP 10/5/23, 10/11/23 Cards, 10/12/23 Neurosurg. Declines need for transportation resources, dtr takes her to all appts. Care Transition Nurse reviewed discharge instructions, medical action plan, and red flags with patient who verbalized understanding. The patient was given an opportunity to ask questions and does not have any further questions or concerns at this time. Were discharge instructions available to patient? Yes. Reviewed appropriate site of care based on symptoms and resources available to patient including: PCP  Specialist  Benefits related nurse triage line  Urgent care clinics  [facility]  When to call Willard Collins. The patient agrees to contact the PCP office for questions related to their healthcare. Advance Care Planning:   Does patient have an Advance Directive: not on file. Medication reconciliation was performed with  dtr Smitha , who verbalizes understanding of administration of home medications. Medications reviewed, 1111F entered: yes    Was patient discharged with a pulse oximeter? no    Non-face-to-face services provided:  Obtained and reviewed discharge summary and/or continuity of care documents  Communication with home health agencies or other community services the patient is currently using-[facility]  Education of patient/family/caregiver/guardian to support self-management-spinal fx, safety fall prevention  Assessment and support for treatment adherence and medication management-med review    Offered patient enrollment in the Remote Patient Monitoring (RPM) program for in-home monitoring: Yes, but did not enroll at this time.     Care Transitions 24 Hour Call    Schedule Follow Up Appointment with PCP: Completed  Do you have a copy of your discharge instructions?: Yes  Do you have all of your prescriptions and are they filled?: Yes  Have you been contacted by a

## 2023-10-06 ENCOUNTER — OFFICE VISIT (OUTPATIENT)
Dept: UROLOGY | Facility: AMBULATORY SURGERY CENTER | Age: 68
End: 2023-10-06
Payer: MEDICARE

## 2023-10-06 VITALS
OXYGEN SATURATION: 94 % | SYSTOLIC BLOOD PRESSURE: 124 MMHG | HEIGHT: 71 IN | BODY MASS INDEX: 25.76 KG/M2 | WEIGHT: 184 LBS | HEART RATE: 67 BPM | RESPIRATION RATE: 18 BRPM | DIASTOLIC BLOOD PRESSURE: 76 MMHG

## 2023-10-06 DIAGNOSIS — C61 PROSTATE CANCER (HCC): Primary | ICD-10-CM

## 2023-10-06 PROCEDURE — 99214 OFFICE O/P EST MOD 30 MIN: CPT | Performed by: UROLOGY

## 2023-10-06 NOTE — PROGRESS NOTES
10/6/2023    Olivia Heller  1955  [id number]        Assessment  History of small volume Dallas 6 prostate cancer on active surveillance, multiple myeloma with iliac adenopathy, rising PSA       Discussion  I provided the patient with reassurance that his digital rectal examination is unchanged with a benign but enlarged prostate. We discussed that his last multiparametric MRI of the prostate was performed in December 2022. As his PSA has now risen to 6.35 out of his prior normal range of approximately 4, I recommend repeating the multiparametric MRI of the prostate in December 2023 1 year since his last.  He will return in follow-up for additional discussion at that time. I would only consider an additional biopsy if there is a significant change in the PI-RADS scoring on the MRI. The patient is amenable with this plan and again understands the risk associated with active surveillance including disease progression. History of Present Illness  74 y.o. male with a history of multiple myeloma as well as stage IIIb chronic kidney disease. I have been following Janay Castillo since I diagnosed him with small volume Dallas 6 prostate cancer in 2012 more than 10 years ago. He has been on active surveillance since that time with relatively minimal change in his PSA. In December 2022 he underwent a multiparametric MRI of the prostate as part of active surveillance. Prior to this he was found to have a a 3 cm iliac lymph node. He underwent CT-guided needle biopsy and pathology was consistent with a reactive lymph node. He was referred to medical oncology and a diagnosis of multiple myeloma was made. He returns for routine follow-up today. His last prostate biopsy was performed in 2018 revealed 3 cores out of 12 positive for relatively small volume Dallas 3+3 disease.   His most recent multiparametric MRI of the prostate was performed just under 1 year ago in December 2022 which revealed the enlarged lymph node as well as PI-RADS 2 scoring. His most recent PSA is 6.35 up from his range around 4. AUA Symptom Score      Review of Systems  Review of Systems   Constitutional: Negative. HENT: Negative. Eyes: Negative. Respiratory: Negative. Cardiovascular: Negative. Gastrointestinal: Negative. Endocrine: Negative. Genitourinary:        Per HPI   Musculoskeletal: Negative. Skin: Negative. Allergic/Immunologic: Negative. Neurological: Negative. Hematological: Negative. Psychiatric/Behavioral: Negative. Past Medical History  Past Medical History:   Diagnosis Date   • Adenocarcinoma of prostate ([address])     Last assessed 08/08/17   • Chronic kidney disease    • Colon polyp    • Gout    • Psoriasis        Past Social History  Past Surgical History:   Procedure Laterality Date   • COLONOSCOPY     • IR BIOPSY BONE MARROW  2/21/2023   • IR BIOPSY KIDNEY RANDOM  4/19/2023   • IR BIOPSY LYMPH NODE  6/13/2022   • AL COLONOSCOPY FLX DX W/COLLJ SPEC WHEN PFRMD N/A 08/31/2018    Procedure: COLONOSCOPY;  Surgeon: Lynn Benavidez MD;  Location: [address] [place] [address] GI LAB;   Service: Colorectal   • PROSTATE BIOPSY  02/16/2018   • TONSILLECTOMY         Past Family History  Family History   Problem Relation Age of Onset   • Arthritis Mother    • Hyperlipidemia Mother    • Hypertension Mother    • Diabetes Son        Past Social history  Social History     Socioeconomic History   • Marital status: /Civil Union     Spouse name: Not on file   • Number of children: Not on file   • Years of education: Not on file   • Highest education level: Not on file   Occupational History   • Occupation:  retired IT and finance   Tobacco Use   • Smoking status: Never   • Smokeless tobacco: Never   Vaping Use   • Vaping Use: Never used   Substance and Sexual Activity   • Alcohol use: Yes     Comment: very rarely   • Drug use: Never   • Sexual activity: Yes     Partners: Female   Other Topics Concern   • Not on file   Social History Narrative   • Not on file     Social Determinants of Health     Financial Resource Strain: Low Risk  (1/11/2023)    Overall Financial Resource Strain (CARDIA)    • Difficulty of Paying Living Expenses: Not hard at all   Food Insecurity: Not on file   Transportation Needs: No Transportation Needs (1/11/2023)    PRAPARE - Transportation    • Lack of Transportation (Medical): No    • Lack of Transportation (Non-Medical): No   Physical Activity: Not on file   Stress: Not on file   Social Connections: Not on file   Intimate Partner Violence: Not on file   Housing Stability: Not on file       Current Medications  Current Outpatient Medications   Medication Sig Dispense Refill   • cholecalciferol (VITAMIN D3) 1,000 units tablet Take 1 tablet (1,000 Units total) by mouth daily     • clobetasol (TEMOVATE) 0.05 % cream      • Coenzyme Q10 (CoQ10) 100 MG CAPS Take 1 capsule (100 mg total) by mouth in the morning     • hydrochlorothiazide (HYDRODIURIL) 12.5 mg tablet Take 1 tablet (12.5 mg total) by mouth daily 90 tablet 3   • lisinopril (ZESTRIL) 40 mg tablet Take 1 tablet (40 mg total) by mouth daily 90 tablet 1   • lovastatin (MEVACOR) 40 MG tablet TAKE 1 TABLET BY MOUTH EVERY DAY 90 tablet 1   • Magnesium 250 MG TABS Take 1 tablet (250 mg total) by mouth 2 (two) times a day  0   • Omega-3 Fatty Acids (fish oil) 1,000 mg Take 1 capsule (1,000 mg total) by mouth daily  0   • Tremfya subcutaneous injection        No current facility-administered medications for this visit. Allergies  Allergies   Allergen Reactions   • Amlodipine Hives   • Penicillins Hives   • Fenofibrate Itching and Rash   • Iodinated Contrast Media Rash       Past Medical History, Social History, Family History, medications and allergies were reviewed.     Vitals  Vitals:    10/06/23 0728   BP: 124/76   BP Location: Left arm   Patient Position: Sitting   Cuff Size: Adult   Pulse: 67   Resp: 18   SpO2: 94%   Weight: 83.5 kg (184 lb)   Height: 5' 11" (1.803 m)       Physical Exam  Physical Exam  On examination he is in no acute distress. His abdomen is soft nontender nondistended.  examination reveals normal phallus, scrotum and scrotal contents. The left scrotal cyst is difficult to identify on exam today. Digital rectal examination reveals a benign 80+ grams prostate which is smooth and firm but without nodularity. Skin is warm. Extremities without edema.   Neurologic is grossly intact and nonfocal.  Gait normal.  Affect normal    Results  Lab Results   Component Value Date    PSA 6.35 (H) 09/25/2023    PSA 3.9 12/19/2022    PSA 3.8 06/28/2022     Lab Results   Component Value Date    CALCIUM 9.9 07/07/2023     11/30/2017     11/30/2017     11/30/2017     11/30/2017    K 4.1 07/07/2023    CO2 27 07/07/2023     07/07/2023    BUN 37 (H) 07/07/2023    CREATININE 2.21 (H) 07/07/2023     Lab Results   Component Value Date    WBC 7.35 07/07/2023    HGB 14.3 07/07/2023    HCT 48.0 07/07/2023    MCV 86 07/07/2023     07/07/2023         Office Urine Dip  No results found for this or any previous visit (from the past 1 hour(s)).]

## 2023-10-06 NOTE — LETTER
October 6, 2023     [facility]ell [place], [address] [address]    Patient: Kannan Cornejo   YOB: 1955   Date of Visit: 10/6/2023       Dear Dr. Beatriz Roach: Thank you for referring Kannan Cornejo to me for evaluation. Below are my notes for this consultation. If you have questions, please do not hesitate to call me. I look forward to following your patient along with you. Sincerely,        Lanie Harrison MD        CC: DO Kamini Durbin MD Joye Clear, MD  10/6/2023  7:54 AM  Sign when Signing Visit  10/6/2023    Kannan Cornejo  1955  [id number]        Assessment  History of small volume [place] 6 prostate cancer on active surveillance, multiple myeloma with iliac adenopathy, rising PSA       Discussion  I provided the patient with reassurance that his digital rectal examination is unchanged with a benign but enlarged prostate. We discussed that his last multiparametric MRI of the prostate was performed in December 2022. As his PSA has now risen to 6.35 out of his prior normal range of approximately 4, I recommend repeating the multiparametric MRI of the prostate in December 2023 1 year since his last.  He will return in follow-up for additional discussion at that time. I would only consider an additional biopsy if there is a significant change in the PI-RADS scoring on the MRI. The patient is amenable with this plan and again understands the risk associated with active surveillance including disease progression. History of Present Illness  74 y.o. male with a history of multiple myeloma as well as stage IIIb chronic kidney disease. I have been following Tianna Morales since I diagnosed him with small volume [place] 6 prostate cancer in 2012 more than 10 years ago. He has been on active surveillance since that time with relatively minimal change in his PSA.   In December 2022 he underwent a multiparametric MRI of the prostate as part of active surveillance. Prior to this he was found to have a a 3 cm iliac lymph node. He underwent CT-guided needle biopsy and pathology was consistent with a reactive lymph node. He was referred to medical oncology and a diagnosis of multiple myeloma was made. He returns for routine follow-up today. His last prostate biopsy was performed in 2018 revealed 3 cores out of 12 positive for relatively small volume [place] 3+3 disease. His most recent multiparametric MRI of the prostate was performed just under 1 year ago in December 2022 which revealed the enlarged lymph node as well as PI-RADS 2 scoring. His most recent PSA is 6.35 up from his range around 4. AUA Symptom Score      Review of Systems  Review of Systems   Constitutional: Negative. HENT: Negative. Eyes: Negative. Respiratory: Negative. Cardiovascular: Negative. Gastrointestinal: Negative. Endocrine: Negative. Genitourinary:        Per HPI   Musculoskeletal: Negative. Skin: Negative. Allergic/Immunologic: Negative. Neurological: Negative. Hematological: Negative. Psychiatric/Behavioral: Negative. Past Medical History  Past Medical History:   Diagnosis Date   • Adenocarcinoma of prostate ([address])     Last assessed 08/08/17   • Chronic kidney disease    • Colon polyp    • Gout    • Psoriasis        Past Social History  Past Surgical History:   Procedure Laterality Date   • COLONOSCOPY     • IR BIOPSY BONE MARROW  2/21/2023   • IR BIOPSY KIDNEY RANDOM  4/19/2023   • IR BIOPSY LYMPH NODE  6/13/2022   • SD COLONOSCOPY FLX DX W/COLLJ SPEC WHEN PFRMD N/A 08/31/2018    Procedure: COLONOSCOPY;  Surgeon: Franklin Ordoñez MD;  Location: [address] [place] [address] GI LAB;   Service: Colorectal   • PROSTATE BIOPSY  02/16/2018   • TONSILLECTOMY         Past Family History  Family History   Problem Relation Age of Onset   • Arthritis Mother    • Hyperlipidemia Mother    • Hypertension Mother    • Diabetes Son        Past Social history  Social History     Socioeconomic History   • Marital status: /Civil Union     Spouse name: Not on file   • Number of children: Not on file   • Years of education: Not on file   • Highest education level: Not on file   Occupational History   • Occupation:  retired IT and finance   Tobacco Use   • Smoking status: Never   • Smokeless tobacco: Never   Vaping Use   • Vaping Use: Never used   Substance and Sexual Activity   • Alcohol use: Yes     Comment: very rarely   • Drug use: Never   • Sexual activity: Yes     Partners: Female   Other Topics Concern   • Not on file   Social History Narrative   • Not on file     Social Determinants of Health     Financial Resource Strain: Low Risk  (1/11/2023)    Overall Financial Resource Strain (CARDIA)    • Difficulty of Paying Living Expenses: Not hard at all   Food Insecurity: Not on file   Transportation Needs: No Transportation Needs (1/11/2023)    PRAPARE - Transportation    • Lack of Transportation (Medical): No    • Lack of Transportation (Non-Medical):  No   Physical Activity: Not on file   Stress: Not on file   Social Connections: Not on file   Intimate Partner Violence: Not on file   Housing Stability: Not on file       Current Medications  Current Outpatient Medications   Medication Sig Dispense Refill   • cholecalciferol (VITAMIN D3) 1,000 units tablet Take 1 tablet (1,000 Units total) by mouth daily     • clobetasol (TEMOVATE) 0.05 % cream      • Coenzyme Q10 (CoQ10) 100 MG CAPS Take 1 capsule (100 mg total) by mouth in the morning     • hydrochlorothiazide (HYDRODIURIL) 12.5 mg tablet Take 1 tablet (12.5 mg total) by mouth daily 90 tablet 3   • lisinopril (ZESTRIL) 40 mg tablet Take 1 tablet (40 mg total) by mouth daily 90 tablet 1   • lovastatin (MEVACOR) 40 MG tablet TAKE 1 TABLET BY MOUTH EVERY DAY 90 tablet 1   • Magnesium 250 MG TABS Take 1 tablet (250 mg total) by mouth 2 (two) times a day  0   • Omega-3 Fatty Acids (fish oil) 1,000 mg Take 1 capsule (1,000 mg total) by mouth daily  0   • Tremfya subcutaneous injection        No current facility-administered medications for this visit. Allergies  Allergies   Allergen Reactions   • Amlodipine Hives   • Penicillins Hives   • Fenofibrate Itching and Rash   • Iodinated Contrast Media Rash       Past Medical History, Social History, Family History, medications and allergies were reviewed. Vitals  Vitals:    10/06/23 0728   BP: 124/76   BP Location: Left arm   Patient Position: Sitting   Cuff Size: Adult   Pulse: 67   Resp: 18   SpO2: 94%   Weight: 83.5 kg (184 lb)   Height: 5' 11" (1.803 m)       Physical Exam  Physical Exam  On examination he is in no acute distress. His abdomen is soft nontender nondistended.  examination reveals normal phallus, scrotum and scrotal contents. The left scrotal cyst is difficult to identify on exam today. Digital rectal examination reveals a benign 80+ grams prostate which is smooth and firm but without nodularity. Skin is warm. Extremities without edema.   Neurologic is grossly intact and nonfocal.  Gait normal.  Affect normal    Results  Lab Results   Component Value Date    PSA 6.35 (H) 09/25/2023    PSA 3.9 12/19/2022    PSA 3.8 06/28/2022     Lab Results   Component Value Date    CALCIUM 9.9 07/07/2023     11/30/2017     11/30/2017     11/30/2017     11/30/2017    K 4.1 07/07/2023    CO2 27 07/07/2023     07/07/2023    BUN 37 (H) 07/07/2023    CREATININE 2.21 (H) 07/07/2023     Lab Results   Component Value Date    WBC 7.35 07/07/2023    HGB 14.3 07/07/2023    HCT 48.0 07/07/2023    MCV 86 07/07/2023     07/07/2023         Office Urine Dip  No results found for this or any previous visit (from the past 1 hour(s)).]

## 2023-10-26 ENCOUNTER — APPOINTMENT (OUTPATIENT)
Dept: LAB | Facility: CLINIC | Age: 68
End: 2023-10-26
Payer: MEDICARE

## 2023-10-26 ENCOUNTER — TRANSCRIBE ORDERS (OUTPATIENT)
Dept: LAB | Facility: CLINIC | Age: 68
End: 2023-10-26

## 2023-10-26 DIAGNOSIS — Z79.899 ENCOUNTER FOR LONG-TERM (CURRENT) USE OF OTHER MEDICATIONS: Primary | ICD-10-CM

## 2023-10-26 DIAGNOSIS — Z79.899 ENCOUNTER FOR LONG-TERM (CURRENT) USE OF OTHER MEDICATIONS: ICD-10-CM

## 2023-10-26 LAB
ALBUMIN SERPL BCP-MCNC: 4 G/DL (ref 3.5–5)
ALP SERPL-CCNC: 45 U/L (ref 34–104)
ALT SERPL W P-5'-P-CCNC: 10 U/L (ref 7–52)
ANION GAP SERPL CALCULATED.3IONS-SCNC: 7 MMOL/L
AST SERPL W P-5'-P-CCNC: 16 U/L (ref 13–39)
BASOPHILS # BLD AUTO: 0.03 THOUSANDS/ÂΜL (ref 0–0.1)
BASOPHILS NFR BLD AUTO: 0 % (ref 0–1)
BILIRUB SERPL-MCNC: 0.32 MG/DL (ref 0.2–1)
BUN SERPL-MCNC: 38 MG/DL (ref 5–25)
CALCIUM SERPL-MCNC: 9.9 MG/DL (ref 8.4–10.2)
CHLORIDE SERPL-SCNC: 106 MMOL/L (ref 96–108)
CO2 SERPL-SCNC: 27 MMOL/L (ref 21–32)
CREAT SERPL-MCNC: 2.41 MG/DL (ref 0.6–1.3)
EOSINOPHIL # BLD AUTO: 0.16 THOUSAND/ÂΜL (ref 0–0.61)
EOSINOPHIL NFR BLD AUTO: 2 % (ref 0–6)
ERYTHROCYTE [DISTWIDTH] IN BLOOD BY AUTOMATED COUNT: 15.2 % (ref 11.6–15.1)
GFR SERPL CREATININE-BSD FRML MDRD: 26 ML/MIN/1.73SQ M
GLUCOSE SERPL-MCNC: 91 MG/DL (ref 65–140)
HCT VFR BLD AUTO: 44.5 % (ref 36.5–49.3)
HGB BLD-MCNC: 13.4 G/DL (ref 12–17)
IMM GRANULOCYTES # BLD AUTO: 0.02 THOUSAND/UL (ref 0–0.2)
IMM GRANULOCYTES NFR BLD AUTO: 0 % (ref 0–2)
LYMPHOCYTES # BLD AUTO: 1.31 THOUSANDS/ÂΜL (ref 0.6–4.47)
LYMPHOCYTES NFR BLD AUTO: 20 % (ref 14–44)
MCH RBC QN AUTO: 27 PG (ref 26.8–34.3)
MCHC RBC AUTO-ENTMCNC: 30.1 G/DL (ref 31.4–37.4)
MCV RBC AUTO: 90 FL (ref 82–98)
MONOCYTES # BLD AUTO: 0.8 THOUSAND/ÂΜL (ref 0.17–1.22)
MONOCYTES NFR BLD AUTO: 12 % (ref 4–12)
NEUTROPHILS # BLD AUTO: 4.4 THOUSANDS/ÂΜL (ref 1.85–7.62)
NEUTS SEG NFR BLD AUTO: 66 % (ref 43–75)
NRBC BLD AUTO-RTO: 0 /100 WBCS
PLATELET # BLD AUTO: 208 THOUSANDS/UL (ref 149–390)
PMV BLD AUTO: 10.8 FL (ref 8.9–12.7)
POTASSIUM SERPL-SCNC: 4.5 MMOL/L (ref 3.5–5.3)
PROT SERPL-MCNC: 7.3 G/DL (ref 6.4–8.4)
RBC # BLD AUTO: 4.97 MILLION/UL (ref 3.88–5.62)
SODIUM SERPL-SCNC: 140 MMOL/L (ref 135–147)
WBC # BLD AUTO: 6.72 THOUSAND/UL (ref 4.31–10.16)

## 2023-10-26 PROCEDURE — 80053 COMPREHEN METABOLIC PANEL: CPT

## 2023-10-26 PROCEDURE — 36415 COLL VENOUS BLD VENIPUNCTURE: CPT

## 2023-10-26 PROCEDURE — 85025 COMPLETE CBC W/AUTO DIFF WBC: CPT

## 2023-10-27 ENCOUNTER — APPOINTMENT (OUTPATIENT)
Dept: LAB | Facility: CLINIC | Age: 68
End: 2023-10-27
Payer: MEDICARE

## 2023-10-27 DIAGNOSIS — Z79.899 ENCOUNTER FOR LONG-TERM (CURRENT) USE OF OTHER MEDICATIONS: ICD-10-CM

## 2023-10-27 PROCEDURE — 36415 COLL VENOUS BLD VENIPUNCTURE: CPT

## 2023-10-27 PROCEDURE — 86480 TB TEST CELL IMMUN MEASURE: CPT

## 2023-10-29 LAB
GAMMA INTERFERON BACKGROUND BLD IA-ACNC: <0 IU/ML
M TB IFN-G BLD-IMP: NEGATIVE
M TB IFN-G CD4+ BCKGRND COR BLD-ACNC: 0 IU/ML
M TB IFN-G CD4+ BCKGRND COR BLD-ACNC: 0 IU/ML
MITOGEN IGNF BCKGRD COR BLD-ACNC: 10 IU/ML

## 2023-11-06 ENCOUNTER — APPOINTMENT (OUTPATIENT)
Dept: LAB | Facility: CLINIC | Age: 68
End: 2023-11-06
Payer: MEDICARE

## 2023-11-06 DIAGNOSIS — N18.32 STAGE 3B CHRONIC KIDNEY DISEASE (HCC): ICD-10-CM

## 2023-11-06 DIAGNOSIS — E83.52 HYPERCALCEMIA: ICD-10-CM

## 2023-11-06 DIAGNOSIS — D47.2 SMOLDERING MULTIPLE MYELOMA: ICD-10-CM

## 2023-11-06 DIAGNOSIS — M10.372 ACUTE GOUT DUE TO RENAL IMPAIRMENT INVOLVING TOE OF LEFT FOOT: ICD-10-CM

## 2023-11-06 DIAGNOSIS — R80.8 OTHER PROTEINURIA: ICD-10-CM

## 2023-11-06 LAB
ALBUMIN SERPL BCP-MCNC: 3.9 G/DL (ref 3.5–5)
ALP SERPL-CCNC: 46 U/L (ref 34–104)
ALT SERPL W P-5'-P-CCNC: 10 U/L (ref 7–52)
ANION GAP SERPL CALCULATED.3IONS-SCNC: 7 MMOL/L
AST SERPL W P-5'-P-CCNC: 19 U/L (ref 13–39)
BACTERIA UR QL AUTO: ABNORMAL /HPF
BASOPHILS # BLD AUTO: 0.03 THOUSANDS/ÂΜL (ref 0–0.1)
BASOPHILS NFR BLD AUTO: 0 % (ref 0–1)
BILIRUB SERPL-MCNC: 0.4 MG/DL (ref 0.2–1)
BILIRUB UR QL STRIP: NEGATIVE
BUN SERPL-MCNC: 32 MG/DL (ref 5–25)
CALCIUM SERPL-MCNC: 9.9 MG/DL (ref 8.4–10.2)
CHLORIDE SERPL-SCNC: 106 MMOL/L (ref 96–108)
CK SERPL-CCNC: 427 U/L (ref 39–308)
CLARITY UR: CLEAR
CO2 SERPL-SCNC: 27 MMOL/L (ref 21–32)
COLOR UR: COLORLESS
CREAT SERPL-MCNC: 2.12 MG/DL (ref 0.6–1.3)
CREAT UR-MCNC: 83.9 MG/DL
CREAT UR-MCNC: 83.9 MG/DL
EOSINOPHIL # BLD AUTO: 0.27 THOUSAND/ÂΜL (ref 0–0.61)
EOSINOPHIL NFR BLD AUTO: 3 % (ref 0–6)
ERYTHROCYTE [DISTWIDTH] IN BLOOD BY AUTOMATED COUNT: 15.1 % (ref 11.6–15.1)
GFR SERPL CREATININE-BSD FRML MDRD: 31 ML/MIN/1.73SQ M
GLUCOSE P FAST SERPL-MCNC: 89 MG/DL (ref 65–99)
GLUCOSE UR STRIP-MCNC: NEGATIVE MG/DL
HCT VFR BLD AUTO: 43 % (ref 36.5–49.3)
HGB BLD-MCNC: 13.3 G/DL (ref 12–17)
HGB UR QL STRIP.AUTO: NEGATIVE
IGA SERPL-MCNC: 128 MG/DL (ref 66–433)
IGG SERPL-MCNC: 1368 MG/DL (ref 635–1741)
IGM SERPL-MCNC: 39 MG/DL (ref 45–281)
IMM GRANULOCYTES # BLD AUTO: 0.03 THOUSAND/UL (ref 0–0.2)
IMM GRANULOCYTES NFR BLD AUTO: 0 % (ref 0–2)
KETONES UR STRIP-MCNC: NEGATIVE MG/DL
LEUKOCYTE ESTERASE UR QL STRIP: NEGATIVE
LYMPHOCYTES # BLD AUTO: 2.43 THOUSANDS/ÂΜL (ref 0.6–4.47)
LYMPHOCYTES NFR BLD AUTO: 30 % (ref 14–44)
MCH RBC QN AUTO: 26.8 PG (ref 26.8–34.3)
MCHC RBC AUTO-ENTMCNC: 30.9 G/DL (ref 31.4–37.4)
MCV RBC AUTO: 87 FL (ref 82–98)
MICROALBUMIN UR-MCNC: 276 MG/L
MICROALBUMIN/CREAT 24H UR: 329 MG/G CREATININE (ref 0–30)
MONOCYTES # BLD AUTO: 1.02 THOUSAND/ÂΜL (ref 0.17–1.22)
MONOCYTES NFR BLD AUTO: 13 % (ref 4–12)
NEUTROPHILS # BLD AUTO: 4.25 THOUSANDS/ÂΜL (ref 1.85–7.62)
NEUTS SEG NFR BLD AUTO: 54 % (ref 43–75)
NITRITE UR QL STRIP: NEGATIVE
NON-SQ EPI CELLS URNS QL MICRO: ABNORMAL /HPF
NRBC BLD AUTO-RTO: 0 /100 WBCS
PH UR STRIP.AUTO: 6 [PH]
PLATELET # BLD AUTO: 188 THOUSANDS/UL (ref 149–390)
PMV BLD AUTO: 10.9 FL (ref 8.9–12.7)
POTASSIUM SERPL-SCNC: 4.5 MMOL/L (ref 3.5–5.3)
PROT SERPL-MCNC: 6.9 G/DL (ref 6.4–8.4)
PROT UR STRIP-MCNC: ABNORMAL MG/DL
PROT UR-MCNC: 43 MG/DL
PROT/CREAT UR: 0.51 MG/G{CREAT} (ref 0–0.1)
RBC # BLD AUTO: 4.96 MILLION/UL (ref 3.88–5.62)
RBC #/AREA URNS AUTO: ABNORMAL /HPF
SODIUM SERPL-SCNC: 140 MMOL/L (ref 135–147)
SP GR UR STRIP.AUTO: 1.01 (ref 1–1.03)
URATE SERPL-MCNC: 10.5 MG/DL (ref 3.5–8.5)
UROBILINOGEN UR STRIP-ACNC: <2 MG/DL
WBC # BLD AUTO: 8.03 THOUSAND/UL (ref 4.31–10.16)
WBC #/AREA URNS AUTO: ABNORMAL /HPF

## 2023-11-06 PROCEDURE — 82570 ASSAY OF URINE CREATININE: CPT

## 2023-11-06 PROCEDURE — 85025 COMPLETE CBC W/AUTO DIFF WBC: CPT

## 2023-11-06 PROCEDURE — 83521 IG LIGHT CHAINS FREE EACH: CPT

## 2023-11-06 PROCEDURE — 84550 ASSAY OF BLOOD/URIC ACID: CPT

## 2023-11-06 PROCEDURE — 82043 UR ALBUMIN QUANTITATIVE: CPT

## 2023-11-06 PROCEDURE — 36415 COLL VENOUS BLD VENIPUNCTURE: CPT

## 2023-11-06 PROCEDURE — 84165 PROTEIN E-PHORESIS SERUM: CPT

## 2023-11-06 PROCEDURE — 80053 COMPREHEN METABOLIC PANEL: CPT

## 2023-11-06 PROCEDURE — 82550 ASSAY OF CK (CPK): CPT

## 2023-11-06 PROCEDURE — 84156 ASSAY OF PROTEIN URINE: CPT

## 2023-11-06 PROCEDURE — 81001 URINALYSIS AUTO W/SCOPE: CPT

## 2023-11-06 PROCEDURE — 82784 ASSAY IGA/IGD/IGG/IGM EACH: CPT

## 2023-11-07 LAB
KAPPA LC FREE SER-MCNC: 110.1 MG/L (ref 3.3–19.4)
KAPPA LC FREE/LAMBDA FREE SER: 6.12 {RATIO} (ref 0.26–1.65)
LAMBDA LC FREE SERPL-MCNC: 18 MG/L (ref 5.7–26.3)

## 2023-11-08 LAB
ALBUMIN SERPL ELPH-MCNC: 3.81 G/DL (ref 3.2–5.1)
ALBUMIN SERPL ELPH-MCNC: 58.6 % (ref 48–70)
ALPHA1 GLOB SERPL ELPH-MCNC: 0.25 G/DL (ref 0.15–0.47)
ALPHA1 GLOB SERPL ELPH-MCNC: 3.8 % (ref 1.8–7)
ALPHA2 GLOB SERPL ELPH-MCNC: 0.53 G/DL (ref 0.42–1.04)
ALPHA2 GLOB SERPL ELPH-MCNC: 8.1 % (ref 5.9–14.9)
BETA GLOB ABNORMAL SERPL ELPH-MCNC: 0.33 G/DL (ref 0.31–0.57)
BETA1 GLOB SERPL ELPH-MCNC: 5.1 % (ref 4.7–7.7)
BETA2 GLOB SERPL ELPH-MCNC: 4.5 % (ref 3.1–7.9)
BETA2+GAMMA GLOB SERPL ELPH-MCNC: 0.29 G/DL (ref 0.2–0.58)
GAMMA GLOB ABNORMAL SERPL ELPH-MCNC: 1.29 G/DL (ref 0.4–1.66)
GAMMA GLOB SERPL ELPH-MCNC: 19.9 % (ref 6.9–22.3)
IGG/ALB SER: 1.42 {RATIO} (ref 1.1–1.8)
M PROTEIN 1 MFR SERPL ELPH: 8.1 %
M PROTEIN 1 SERPL ELPH-MCNC: 0.53 G/DL
PROT PATTERN SERPL ELPH-IMP: NORMAL
PROT SERPL-MCNC: 6.5 G/DL (ref 6.4–8.2)

## 2023-11-08 PROCEDURE — 84165 PROTEIN E-PHORESIS SERUM: CPT | Performed by: STUDENT IN AN ORGANIZED HEALTH CARE EDUCATION/TRAINING PROGRAM

## 2023-11-13 ENCOUNTER — OFFICE VISIT (OUTPATIENT)
Dept: NEPHROLOGY | Facility: CLINIC | Age: 68
End: 2023-11-13
Payer: MEDICARE

## 2023-11-13 VITALS
HEART RATE: 77 BPM | HEIGHT: 71 IN | SYSTOLIC BLOOD PRESSURE: 130 MMHG | BODY MASS INDEX: 25.76 KG/M2 | WEIGHT: 184 LBS | DIASTOLIC BLOOD PRESSURE: 78 MMHG

## 2023-11-13 DIAGNOSIS — N18.32 STAGE 3B CHRONIC KIDNEY DISEASE (HCC): Primary | ICD-10-CM

## 2023-11-13 DIAGNOSIS — M10.372 ACUTE GOUT DUE TO RENAL IMPAIRMENT INVOLVING TOE OF LEFT FOOT: ICD-10-CM

## 2023-11-13 DIAGNOSIS — D47.2 SMOLDERING MULTIPLE MYELOMA: ICD-10-CM

## 2023-11-13 DIAGNOSIS — I10 ESSENTIAL HYPERTENSION: ICD-10-CM

## 2023-11-13 DIAGNOSIS — R80.8 OTHER PROTEINURIA: ICD-10-CM

## 2023-11-13 PROBLEM — E83.52 HYPERCALCEMIA: Status: RESOLVED | Noted: 2018-02-21 | Resolved: 2023-11-13

## 2023-11-13 PROBLEM — R31.29 MICROSCOPIC HEMATURIA: Status: RESOLVED | Noted: 2017-08-29 | Resolved: 2023-11-13

## 2023-11-13 PROCEDURE — 99214 OFFICE O/P EST MOD 30 MIN: CPT | Performed by: INTERNAL MEDICINE

## 2023-11-13 NOTE — PROGRESS NOTES
NEPHROLOGY OUTPATIENT PROGRESS NOTE   Kacie Collier 79 y.o. male MRN: [id number]  DATE: 11/13/2023  Reason for visit:   Chief Complaint   Patient presents with    Chronic Kidney Disease    Follow-up        Patient Instructions   1. Chronic kidney disease stage 3b in setting of prostatic adenocarcinoma as well as smoldering myeloma, CKD likely d/t biopsy proven(4/19/23) hypertensive arterionephrosclerosis  - eGFR ~31ml/min per CKD EPI equation. Last sCr 2.21 as of 7/7/23. -sCr appears to be at a new baseline of 1.9-2.2 since June 2022. Remains stable.   -continue to avoid nonsteroidals(motrin, aleve, advil, ibuprofen, toradol, naproxen, celebrex, indomethacin and meloxicam)  -renal u/s August 2017 shows echogenic kidneys with b/l renal cysts. The cause of this is unclear.  -last urinalysis shows 1+protein in urine, without blood and UpCr 0.51g-proteinuria improving  -Check BMP in 3 months as well as urine protein:Cr and microalb:Cr   -s/p CKD education class     2. HTN - BP well controlled. Have correlated home BP cuff in past which appears to be accurate. Continue low salt diet. -on lisinopril 40mg daily, HCTZ 12.5mg daily. -Goal BP < 130/80. Call office if BP at home persistently above goal.      3. Proteinuria - noted on UA. +1 protein on UA and UpCr 0.51g - has smoldering myeloma. 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    -Will continue lisinopril 40mg daily. Monitor UpCr and microalb:Cr     4. Prostate cancer - Undergoing active surveillance by urology. Last PSA 6.4 - has increased, to have MRI     5. microscopic hematuria - does not appear to be glomerular in origin and UA shows no RBCs, resolved     6. Hypercalcemia, mild  ? D/t monoclonal gammopathy vs prostate ca vs primary hyperparathyroidism - improved, corrected calcium 10. On cholecalciferol 1000 units daily. Last PTH normal. VIt D normal.      7. Kappa light chain smoldering multiple myeloma - follows with hematology     8. Gout - had acute episode, resolved after steroid shot, had uric acid level of 11 in April 2023. Repeat uric acid 10.5 in Nov. 2023. 9. myalgias- on statin, CK level elevated, will d/w primary team    10. Psoriasis - on tremfya per dermatology     RTC in 4 months. Tenzin Prieto was seen today for chronic kidney disease and follow-up. Diagnoses and all orders for this visit:    Stage 3b chronic kidney disease ([address])  -     Basic metabolic panel; Future  -     Urinalysis with microscopic; Future  -     Protein / creatinine ratio, urine; Future  -     Albumin / creatinine urine ratio; Future    Essential hypertension    Acute gout due to renal impairment involving toe of left foot    Other proteinuria  -     Protein / creatinine ratio, urine; Future  -     Albumin / creatinine urine ratio; Future    Smoldering multiple myeloma        Assessment/Plan:  1. Chronic kidney disease stage 3b in setting of prostatic adenocarcinoma as well as smoldering myeloma, CKD likely d/t biopsy proven(4/19/23) hypertensive arterionephrosclerosis  - eGFR ~31ml/min per CKD EPI equation. Last sCr 2.21 as of 7/7/23. -sCr appears to be at a new baseline of 1.9-2.2 since June 2022. Remains stable.   -continue to avoid nonsteroidals(motrin, aleve, advil, ibuprofen, toradol, naproxen, celebrex, indomethacin and meloxicam)  -renal u/s August 2017 shows echogenic kidneys with b/l renal cysts. The cause of this is unclear.  -last urinalysis shows 1+protein in urine, without blood and UpCr 0.51g-proteinuria improving  -Check BMP in 3 months as well as urine protein:Cr and microalb:Cr   -s/p CKD education class     2. HTN - BP well controlled. Have correlated home BP cuff in past which appears to be accurate. Continue low salt diet. -on lisinopril 40mg daily, HCTZ 12.5mg daily. -Goal BP < 130/80. Call office if BP at home persistently above goal.      3. Proteinuria - noted on UA. +1 protein on UA and UpCr 0.51g - has smoldering myeloma. 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    -Will continue lisinopril 40mg daily. Monitor UpCr and microalb:Cr     4. Prostate cancer - Undergoing active surveillance by urology. Last PSA 6.4 - has increased, to have MRI     5. microscopic hematuria - does not appear to be glomerular in origin and UA shows no RBCs, resolved     6. Hypercalcemia, mild  ? D/t monoclonal gammopathy vs prostate ca vs primary hyperparathyroidism - improved, corrected calcium 10. On cholecalciferol 1000 units daily. Last PTH normal. VIt D normal.      7. Kappa light chain smoldering multiple myeloma - follows with hematology     8. Gout - had acute episode, resolved after steroid shot, had uric acid level of 11 in April 2023. Repeat uric acid 10.5 in Nov. 2023. 9. myalgias- on statin, CK level elevated, will d/w primary team    10. Psoriasis - on tremfya per dermatology     RTC in 4 months. SUBJECTIVE / INTERVAL HISTORY:  79 y.o. male presents in follow up of CKD. Nikki Baker denies any recent illness/hospitalizations/medication changes since last office visit. Denies NSAID use. HTN - BP at home well controlled. Denies recent gout flares. Now his vitiligo, seeing dermatology, Dr. Adi Roberson. Review of Systems   Constitutional:  Negative for chills and fever. HENT:  Negative for sore throat. Eyes:  Negative for visual disturbance. Respiratory:  Negative for cough and shortness of breath. Cardiovascular:  Negative for chest pain and leg swelling. Gastrointestinal:  Negative for abdominal pain, constipation, diarrhea, nausea and vomiting. Endocrine: Negative for polyuria. Genitourinary:  Negative for decreased urine volume, difficulty urinating, dysuria and hematuria. Musculoskeletal:  Negative for back pain and myalgias. Skin:  Negative for rash. Neurological:  Negative for dizziness, light-headedness and numbness. Psychiatric/Behavioral:  Negative for confusion. OBJECTIVE:  /78 (BP Location: Left arm, Patient Position: Sitting, Cuff Size: Standard)   Pulse 77   Ht 5' 11" (1.803 m)   Wt 83.5 kg (184 lb)   BMI 25.66 kg/m²  Body mass index is 25.66 kg/m². Physical exam:  Physical Exam  Vitals reviewed. Constitutional:       General: He is not in acute distress. Appearance: Normal appearance. He is well-developed. He is not diaphoretic. HENT:      Head: Normocephalic and atraumatic. Nose: Nose normal.      Mouth/Throat:      Mouth: Mucous membranes are dry. Pharynx: No oropharyngeal exudate. Eyes:      General: No scleral icterus. Right eye: No discharge. Left eye: No discharge. Comments: eyeglasses   Neck:      Thyroid: No thyromegaly. Cardiovascular:      Rate and Rhythm: Normal rate and regular rhythm. Heart sounds: Normal heart sounds. Pulmonary:      Effort: Pulmonary effort is normal.      Breath sounds: Normal breath sounds. No wheezing or rales. Abdominal:      General: Bowel sounds are normal. There is no distension. Palpations: Abdomen is soft. Tenderness: There is no abdominal tenderness. Musculoskeletal:         General: No swelling. Normal range of motion. Cervical back: Neck supple. Lymphadenopathy:      Cervical: No cervical adenopathy. Skin:     General: Skin is warm and dry. Coloration: Skin is not jaundiced. Findings: No rash. Neurological:      General: No focal deficit present. Mental Status: He is alert.       Comments: awake   Psychiatric:         Mood and Affect: Mood normal.         Behavior: Behavior normal.         Medications:    Current Outpatient Medications:     cholecalciferol (VITAMIN D3) 1,000 units tablet, Take 1 tablet (1,000 Units total) by mouth daily, Disp: , Rfl:     hydrochlorothiazide (HYDRODIURIL) 12.5 mg tablet, Take 1 tablet (12.5 mg total) by mouth daily, Disp: 90 tablet, Rfl: 3    lisinopril (ZESTRIL) 40 mg tablet, Take 1 tablet (40 mg total) by mouth daily, Disp: 90 tablet, Rfl: 1    lovastatin (MEVACOR) 40 MG tablet, TAKE 1 TABLET BY MOUTH EVERY DAY, Disp: 90 tablet, Rfl: 1    Tremfya subcutaneous injection, , Disp: , Rfl:     Allergies: Allergies as of 11/13/2023 - Reviewed 11/13/2023   Allergen Reaction Noted    Amlodipine Hives 07/23/2018    Penicillins Hives 08/29/2017    Fenofibrate Itching and Rash 07/10/2015    Iodinated contrast media Rash 06/06/2022       The following portions of the patient's history were reviewed and updated as appropriate: past family history, past surgical history and problem list.    Laboratory Results:  Lab Results   Component Value Date    SODIUM 140 11/06/2023    K 4.5 11/06/2023     11/06/2023    CO2 27 11/06/2023    BUN 32 (H) 11/06/2023    CREATININE 2.12 (H) 11/06/2023    GLUC 91 10/26/2023    CALCIUM 9.9 11/06/2023        Lab Results   Component Value Date    PTH 86.0 07/07/2023    CALCIUM 9.9 11/06/2023    CAION 5.8 (H) 11/30/2017    CAION 5.8 (H) 11/30/2017    CAION 5.8 (H) 11/30/2017    CAION 5.8 (H) 11/30/2017    PHOS 3.3 07/07/2023       Portions of the record may have been created with voice recognition software. Occasional wrong word or "sound a like" substitutions may have occurred due to the inherent limitations of voice recognition software. Read the chart carefully and recognize, using context, where substitutions have occurred.

## 2023-11-13 NOTE — PATIENT INSTRUCTIONS
1. Chronic kidney disease stage 3b in setting of prostatic adenocarcinoma as well as smoldering myeloma, CKD likely d/t biopsy proven(4/19/23) hypertensive arterionephrosclerosis  - eGFR ~31ml/min per CKD EPI equation. Last sCr 2.21 as of 7/7/23. -sCr appears to be at a new baseline of 1.9-2.2 since June 2022. Remains stable.   -continue to avoid nonsteroidals(motrin, aleve, advil, ibuprofen, toradol, naproxen, celebrex, indomethacin and meloxicam)  -renal u/s August 2017 shows echogenic kidneys with b/l renal cysts. The cause of this is unclear.  -last urinalysis shows 1+protein in urine, without blood and UpCr 0.51g-proteinuria improving  -Check BMP in 3 months as well as urine protein:Cr and microalb:Cr   -s/p CKD education class     2. HTN - BP well controlled. Have correlated home BP cuff in past which appears to be accurate. Continue low salt diet. -on lisinopril 40mg daily, HCTZ 12.5mg daily. -Goal BP < 130/80. Call office if BP at home persistently above goal.      3. Proteinuria - noted on UA. +1 protein on UA and UpCr 0.51g - has smoldering myeloma. 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    -Will continue lisinopril 40mg daily. Monitor UpCr and microalb:Cr     4. Prostate cancer - Undergoing active surveillance by urology. Last PSA 6.4 - has increased, to have MRI     5. microscopic hematuria - does not appear to be glomerular in origin and UA shows no RBCs, resolved     6. Hypercalcemia, mild  ? D/t monoclonal gammopathy vs prostate ca vs primary hyperparathyroidism - improved, corrected calcium 10. On cholecalciferol 1000 units daily. Last PTH normal. VIt D normal.      7. Kappa light chain smoldering multiple myeloma - follows with hematology     8. Gout - had acute episode, resolved after steroid shot, had uric acid level of 11 in April 2023. Repeat uric acid 10.5 in Nov. 2023. 9. myalgias- on statin, CK level elevated, will d/w primary team    10.  Psoriasis - on tremfya per dermatology     RTC in 4 months.

## 2023-11-14 ENCOUNTER — OFFICE VISIT (OUTPATIENT)
Dept: HEMATOLOGY ONCOLOGY | Facility: CLINIC | Age: 68
End: 2023-11-14
Payer: MEDICARE

## 2023-11-14 VITALS
BODY MASS INDEX: 26.18 KG/M2 | TEMPERATURE: 97.8 F | RESPIRATION RATE: 17 BRPM | OXYGEN SATURATION: 94 % | HEART RATE: 73 BPM | DIASTOLIC BLOOD PRESSURE: 70 MMHG | HEIGHT: 71 IN | SYSTOLIC BLOOD PRESSURE: 118 MMHG | WEIGHT: 187 LBS

## 2023-11-14 DIAGNOSIS — D47.2 SMOLDERING MULTIPLE MYELOMA: Primary | ICD-10-CM

## 2023-11-14 PROCEDURE — 99214 OFFICE O/P EST MOD 30 MIN: CPT | Performed by: INTERNAL MEDICINE

## 2023-11-14 NOTE — PROGRESS NOTES
Hematology Outpatient Follow - Up Note  Madalyn Craig 79 y.o. 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 bone marrow biopsy on April 2022 showed 12 to 15% plasma cells, normal FISH panel for multiple myeloma as well as cytogenetics, molecular test showed NF 1 variant of unknown significance     No evidence of anemia    Elevated creatinine with chronic kidney disease grade 3, biopsy showed hypertensive nephropathy    Bone marrow biopsy on February 2023 showed 15 to 18% plasma cells no evidence of Congo red staining to suggest amyloidosis    Hyperuricemia    Stage I prostate cancer [place] score of 6 diagnosed in 2012 followed by urology on watchful observation    We will follow-up in 6 months with CBC, CMP, SPEP, free light chain, quantitative immunoglobulins        Labs and imaging studies are reviewed by ordering provider once results are available. If there are findings that need immediate attention, you will be contacted when results available. Discussing results and the implication on your healthcare is best discussed in person at your follow-up visit. HPI:    Madalyn Craig is a 79 y.o. seen for initial consultation 12/5/2019 at the referral of Briana Dennison DO regarding MGUS and elevated H/H.       9/4/2019:  Normal quantitative immunoglobulins. Creatinine 1.61 otherwise normal CMP. Creatinine has been steadily been increasing slowly since 2016. He follows with Dr. Chante Garcia regarding his CKD.     Hemoglobin 16.3, hematocrit 51.2, white blood cell count 8.2 with normal differential, platelet count 313      10/21/2019:  SPEP with immunofixation identified IgG kappa monoclonal band measuring 0.3 gram/deciliter      Workup for MGUS 12/2019 showed normal quantitative immunoglobulins, kappa light chain 14.7, lambda light chain 11.1, erythropoietin 5.4, negative for JAK2 mutation profile as well as MPL 1, CALR for polycythemia, hemoglobin electrophoresis normal.        Does not smoke or drink. He has psoriasis followed by Dr. Praful Arnold. He follows with Dr. Holley Robertson regarding small volume [place] 6 prostate cancer diagnosed on an initial prostate biopsy in 2017. In 2018, a surveillance biopsy again showed 3 of 12 cores with [place] 6 prostate cancer. He is on active surveillance. Bone marrow biopsy in April 2022 showed 12-15% plasma cells, normal cytogenetics, fish panel for multiple myeloma is normal, molecular test however showed NF1 variant of unknown clinical significance. PSMA PET scan 5/2022 Variable mild radiotracer uptake in the previously noted prominent external iliac chain and right inguinal lymph nodes, nonspecific. No PSMA avid osseous lesions. 6/13/22 Lymph node biopsy was inconclusive for metastatic carcinoma or lymphoma. 6/2022- colonoscopy. 12/2022 - MRI of the prostate -PI-RADSv2.1 Category 2 - Low (clinically significant cancer is unlikely to be present). No extraprostatic tumor, seminal vesicle invasion, or pelvic osseous metastatic disease. Slightly decreased size of pelvic lymphadenopathy, in keeping with the reactive lymph node pathology on recent right external iliac lymph node biopsy 6/13/2022. Bone marrow biopsy on 2/21/2023 showed 15 to 18% kappa restricted plasma cell, negative for Congo red staining, 40% cellularity, persistent NF1 mutation of unknown significance          M protein (IgG Kappa) IgG Kappa Ratio     6/2022 0.5 1330 62 3.7     10/22 0.63 1440 91 5.6     2/23 0.67 1460 98 4.8     4/23 0.81 -------- 102 5.3     7/23 0.73 1570 113 6.6      11/23  0.53  1368  110  6.1        No evidence of anemia, hypercalcemia. Total protein and albumin remain normal.  Interval History:        Previous Treatment:         Test Results:    Imaging: No results found.     Labs:   Lab Results   Component Value Date    WBC 8.03 11/06/2023    HGB 13.3 11/06/2023    HCT 43.0 11/06/2023    MCV 87 11/06/2023     11/06/2023     Lab Results   Component Value Date     11/30/2017     11/30/2017     11/30/2017     11/30/2017    K 4.5 11/06/2023     11/06/2023    CO2 27 11/06/2023    BUN 32 (H) 11/06/2023    CREATININE 2.12 (H) 11/06/2023    GLUF 89 11/06/2023    CALCIUM 9.9 11/06/2023    CORRECTEDCA 10.9 (H) 11/21/2022    AST 19 11/06/2023    ALT 10 11/06/2023    ALKPHOS 46 11/06/2023    PROT 7.1 11/30/2017    PROT 7.1 11/30/2017    PROT 7.1 11/30/2017    PROT 7.1 11/30/2017    BILITOT 0.5 11/30/2017    BILITOT 0.5 11/30/2017    BILITOT 0.5 11/30/2017    BILITOT 0.5 11/30/2017    EGFR 31 11/06/2023       No results found for: "IRON", "TIBC", "FERRITIN"    Lab Results   Component Value Date    GSKZOWMY60 422 08/08/2017         ROS: Review of Systems   Constitutional: Negative. Negative for appetite change, chills, diaphoresis, fatigue, fever and unexpected weight change. HENT:   Negative for hearing loss, lump/mass, mouth sores, nosebleeds, sore throat, trouble swallowing and voice change. Eyes: Negative. Negative for eye problems and icterus. Respiratory: Negative. Negative for chest tightness, cough, hemoptysis and shortness of breath. Cardiovascular:  Negative for chest pain and leg swelling. Gastrointestinal:  Negative for abdominal distention, abdominal pain, blood in stool, constipation, diarrhea and nausea. Endocrine: Negative. Genitourinary:  Negative for dysuria, frequency, hematuria and pelvic pain. Musculoskeletal: Negative. Negative for back pain, flank pain, gait problem, myalgias and neck stiffness. Skin:  Negative for itching and rash. Neurological:  Negative for dizziness, gait problem, headaches, light-headedness, numbness and speech difficulty. Hematological:  Negative for adenopathy. Does not bruise/bleed easily.    Psychiatric/Behavioral:  Negative for confusion, decreased concentration, depression and sleep disturbance. The patient is not nervous/anxious. Current Medications: Reviewed  Allergies: Reviewed  PMH/FH/SH:  Reviewed      Physical Exam:    Body surface area is 2.05 meters squared. Wt Readings from Last 3 Encounters:   11/14/23 84.8 kg (187 lb)   11/13/23 83.5 kg (184 lb)   10/06/23 83.5 kg (184 lb)        Temp Readings from Last 3 Encounters:   11/14/23 97.8 °F (36.6 °C) (Temporal)   07/13/23 97.9 °F (36.6 °C) (Temporal)   04/24/23 97.6 °F (36.4 °C) (Temporal)        BP Readings from Last 3 Encounters:   11/14/23 118/70   11/13/23 130/78   10/06/23 124/76         Pulse Readings from Last 3 Encounters:   11/14/23 73   11/13/23 77   10/06/23 67        Physical Exam  Vitals reviewed. Constitutional:       General: He is not in acute distress. Appearance: He is well-developed. He is not diaphoretic. HENT:      Head: Normocephalic and atraumatic. Eyes:      Conjunctiva/sclera: Conjunctivae normal.   Neck:      Trachea: No tracheal deviation. Cardiovascular:      Rate and Rhythm: Normal rate and regular rhythm. Heart sounds: No murmur heard. No friction rub. No gallop. Pulmonary:      Effort: Pulmonary effort is normal. No respiratory distress. Breath sounds: No decreased air movement. No decreased breath sounds, wheezing or rales. Chest:      Chest wall: No tenderness. Abdominal:      General: There is no distension. Palpations: Abdomen is soft. There is no hepatomegaly or splenomegaly. Tenderness: There is no abdominal tenderness. Musculoskeletal:      Cervical back: Normal range of motion and neck supple. Right lower leg: No edema. Left lower leg: No edema. Lymphadenopathy:      Head:      Right side of head: No submental or submandibular adenopathy. Left side of head: No submental or submandibular adenopathy. Cervical: No cervical adenopathy. Upper Body:      Right upper body: No supraclavicular or axillary adenopathy. Left upper body: No supraclavicular or axillary adenopathy. Lower Body: No right inguinal adenopathy. No left inguinal adenopathy. Skin:     General: Skin is warm and dry. Coloration: Skin is not pale. Findings: No erythema or rash. Neurological:      General: No focal deficit present. Mental Status: He is alert and oriented to person, place, and time. Psychiatric:         Behavior: Behavior normal.         Thought Content: Thought content normal.         Judgment: Judgment normal.         ECO    Goals and Barriers:  Current Goal: Minimize effects of disease. Barriers: None. Patient's Capacity to Self Care:  Patient is able to self care.     Code Status: [unfilled]

## 2023-11-21 ENCOUNTER — APPOINTMENT (OUTPATIENT)
Dept: LAB | Facility: CLINIC | Age: 68
End: 2023-11-21
Payer: MEDICARE

## 2023-11-21 DIAGNOSIS — C61 PROSTATE CANCER (HCC): ICD-10-CM

## 2023-11-21 LAB
ANION GAP SERPL CALCULATED.3IONS-SCNC: 8 MMOL/L
BUN SERPL-MCNC: 37 MG/DL (ref 5–25)
CALCIUM SERPL-MCNC: 10.3 MG/DL (ref 8.4–10.2)
CHLORIDE SERPL-SCNC: 106 MMOL/L (ref 96–108)
CO2 SERPL-SCNC: 27 MMOL/L (ref 21–32)
CREAT SERPL-MCNC: 2.17 MG/DL (ref 0.6–1.3)
GFR SERPL CREATININE-BSD FRML MDRD: 30 ML/MIN/1.73SQ M
GLUCOSE P FAST SERPL-MCNC: 77 MG/DL (ref 65–99)
POTASSIUM SERPL-SCNC: 4.3 MMOL/L (ref 3.5–5.3)
SODIUM SERPL-SCNC: 141 MMOL/L (ref 135–147)

## 2023-11-21 PROCEDURE — 80048 BASIC METABOLIC PNL TOTAL CA: CPT

## 2023-11-21 PROCEDURE — 36415 COLL VENOUS BLD VENIPUNCTURE: CPT

## 2023-12-01 ENCOUNTER — HOSPITAL ENCOUNTER (OUTPATIENT)
Dept: RADIOLOGY | Age: 68
Discharge: HOME | End: 2023-12-01
Payer: MEDICARE

## 2023-12-01 DIAGNOSIS — C61 PROSTATE CANCER (HCC): ICD-10-CM

## 2023-12-01 PROCEDURE — A9585 GADOBUTROL INJECTION: HCPCS | Performed by: UROLOGY

## 2023-12-01 PROCEDURE — 72197 MRI PELVIS W/O & W/DYE: CPT

## 2023-12-01 PROCEDURE — G1004 CDSM NDSC: HCPCS

## 2023-12-01 PROCEDURE — 76377 3D RENDER W/INTRP POSTPROCES: CPT

## 2023-12-01 RX ORDER — GADOBUTROL 604.72 MG/ML
9 INJECTION INTRAVENOUS
Status: COMPLETED | OUTPATIENT
Start: 2023-12-01 | End: 2023-12-01

## 2023-12-01 RX ADMIN — GADOBUTROL 9 ML: 604.72 INJECTION INTRAVENOUS at 10:12

## 2023-12-05 DIAGNOSIS — I10 HYPERTENSION, UNSPECIFIED TYPE: ICD-10-CM

## 2023-12-05 RX ORDER — LISINOPRIL 40 MG/1
40 TABLET ORAL DAILY
Qty: 90 TABLET | Refills: 1 | Status: SHIPPED | OUTPATIENT
Start: 2023-12-05

## 2024-01-22 ENCOUNTER — TELEPHONE (OUTPATIENT)
Age: 69
End: 2024-01-22

## 2024-01-22 DIAGNOSIS — E55.9 VITAMIN D DEFICIENCY: ICD-10-CM

## 2024-01-22 DIAGNOSIS — N18.32 STAGE 3B CHRONIC KIDNEY DISEASE (HCC): ICD-10-CM

## 2024-01-22 DIAGNOSIS — E78.2 MIXED HYPERLIPIDEMIA: ICD-10-CM

## 2024-01-22 DIAGNOSIS — I10 ESSENTIAL HYPERTENSION: ICD-10-CM

## 2024-01-22 DIAGNOSIS — C61 ADENOCARCINOMA OF PROSTATE (HCC): Primary | ICD-10-CM

## 2024-01-22 NOTE — TELEPHONE ENCOUNTER
Left detailed voice message informing patient lab script was placed for him to be completed before his next office visit.

## 2024-01-22 NOTE — TELEPHONE ENCOUNTER
Pt contacted the office back to make sure there is an new lab order for him to get done as he can't see it in the chart. I advised pt that labs were ordered and he could stop by the office to  a copy if he will like. Pt will be stopping by to get hard copy

## 2024-01-30 ENCOUNTER — RA CDI HCC (OUTPATIENT)
Dept: OTHER | Facility: HOSPITAL | Age: 69
End: 2024-01-30

## 2024-01-30 ENCOUNTER — TELEPHONE (OUTPATIENT)
Age: 69
End: 2024-01-30

## 2024-01-30 ENCOUNTER — APPOINTMENT (OUTPATIENT)
Dept: LAB | Facility: CLINIC | Age: 69
End: 2024-01-30
Payer: MEDICARE

## 2024-01-30 DIAGNOSIS — C61 ADENOCARCINOMA OF PROSTATE (HCC): ICD-10-CM

## 2024-01-30 LAB
25(OH)D3 SERPL-MCNC: 37.9 NG/ML (ref 30–100)
ALBUMIN SERPL BCP-MCNC: 3.9 G/DL (ref 3.5–5)
ALP SERPL-CCNC: 47 U/L (ref 34–104)
ALT SERPL W P-5'-P-CCNC: 9 U/L (ref 7–52)
ANION GAP SERPL CALCULATED.3IONS-SCNC: 5 MMOL/L
AST SERPL W P-5'-P-CCNC: 17 U/L (ref 13–39)
BASOPHILS # BLD AUTO: 0.03 THOUSANDS/ÂΜL (ref 0–0.1)
BASOPHILS NFR BLD AUTO: 0 % (ref 0–1)
BILIRUB SERPL-MCNC: 0.37 MG/DL (ref 0.2–1)
BUN SERPL-MCNC: 31 MG/DL (ref 5–25)
CALCIUM SERPL-MCNC: 10.1 MG/DL (ref 8.4–10.2)
CHLORIDE SERPL-SCNC: 106 MMOL/L (ref 96–108)
CHOLEST SERPL-MCNC: 184 MG/DL
CO2 SERPL-SCNC: 29 MMOL/L (ref 21–32)
CREAT SERPL-MCNC: 2.14 MG/DL (ref 0.6–1.3)
EOSINOPHIL # BLD AUTO: 0.28 THOUSAND/ÂΜL (ref 0–0.61)
EOSINOPHIL NFR BLD AUTO: 4 % (ref 0–6)
ERYTHROCYTE [DISTWIDTH] IN BLOOD BY AUTOMATED COUNT: 15.6 % (ref 11.6–15.1)
GFR SERPL CREATININE-BSD FRML MDRD: 31 ML/MIN/1.73SQ M
GLUCOSE P FAST SERPL-MCNC: 77 MG/DL (ref 65–99)
HCT VFR BLD AUTO: 48.2 % (ref 36.5–49.3)
HDLC SERPL-MCNC: 32 MG/DL
HGB BLD-MCNC: 14.1 G/DL (ref 12–17)
IMM GRANULOCYTES # BLD AUTO: 0.01 THOUSAND/UL (ref 0–0.2)
IMM GRANULOCYTES NFR BLD AUTO: 0 % (ref 0–2)
LDLC SERPL CALC-MCNC: 105 MG/DL (ref 0–100)
LYMPHOCYTES # BLD AUTO: 1.85 THOUSANDS/ÂΜL (ref 0.6–4.47)
LYMPHOCYTES NFR BLD AUTO: 27 % (ref 14–44)
MCH RBC QN AUTO: 25.2 PG (ref 26.8–34.3)
MCHC RBC AUTO-ENTMCNC: 29.3 G/DL (ref 31.4–37.4)
MCV RBC AUTO: 86 FL (ref 82–98)
MONOCYTES # BLD AUTO: 0.8 THOUSAND/ÂΜL (ref 0.17–1.22)
MONOCYTES NFR BLD AUTO: 12 % (ref 4–12)
NEUTROPHILS # BLD AUTO: 3.87 THOUSANDS/ÂΜL (ref 1.85–7.62)
NEUTS SEG NFR BLD AUTO: 57 % (ref 43–75)
NRBC BLD AUTO-RTO: 0 /100 WBCS
PLATELET # BLD AUTO: 149 THOUSANDS/UL (ref 149–390)
PMV BLD AUTO: 11.1 FL (ref 8.9–12.7)
POTASSIUM SERPL-SCNC: 4.4 MMOL/L (ref 3.5–5.3)
PROT SERPL-MCNC: 7.2 G/DL (ref 6.4–8.4)
PSA SERPL-MCNC: 8.17 NG/ML (ref 0–4)
RBC # BLD AUTO: 5.59 MILLION/UL (ref 3.88–5.62)
SODIUM SERPL-SCNC: 140 MMOL/L (ref 135–147)
TRIGL SERPL-MCNC: 233 MG/DL
TSH SERPL DL<=0.05 MIU/L-ACNC: 2.26 UIU/ML (ref 0.45–4.5)
WBC # BLD AUTO: 6.84 THOUSAND/UL (ref 4.31–10.16)

## 2024-01-30 PROCEDURE — 84153 ASSAY OF PSA TOTAL: CPT

## 2024-01-30 PROCEDURE — 84443 ASSAY THYROID STIM HORMONE: CPT

## 2024-01-30 PROCEDURE — 82306 VITAMIN D 25 HYDROXY: CPT

## 2024-01-30 PROCEDURE — 85025 COMPLETE CBC W/AUTO DIFF WBC: CPT

## 2024-01-30 PROCEDURE — 80053 COMPREHEN METABOLIC PANEL: CPT

## 2024-01-30 PROCEDURE — 36415 COLL VENOUS BLD VENIPUNCTURE: CPT

## 2024-01-30 PROCEDURE — 80061 LIPID PANEL: CPT

## 2024-01-30 NOTE — TELEPHONE ENCOUNTER
Pt had PSA done with PCP and it has increased to 8.17.   Pt would like to know if Dr. Fry would like to see him sooner as pt usually comes every 6 months.    Please review    Pt [phone]

## 2024-02-06 ENCOUNTER — OFFICE VISIT (OUTPATIENT)
Dept: FAMILY MEDICINE CLINIC | Facility: CLINIC | Age: 69
End: 2024-02-06
Payer: MEDICARE

## 2024-02-06 VITALS
SYSTOLIC BLOOD PRESSURE: 100 MMHG | WEIGHT: 187 LBS | DIASTOLIC BLOOD PRESSURE: 70 MMHG | HEART RATE: 77 BPM | HEIGHT: 71 IN | BODY MASS INDEX: 26.18 KG/M2 | OXYGEN SATURATION: 95 %

## 2024-02-06 DIAGNOSIS — N18.32 STAGE 3B CHRONIC KIDNEY DISEASE (HCC): ICD-10-CM

## 2024-02-06 DIAGNOSIS — E78.2 MIXED HYPERLIPIDEMIA: ICD-10-CM

## 2024-02-06 DIAGNOSIS — D69.6 PLATELETS DECREASED (HCC): ICD-10-CM

## 2024-02-06 DIAGNOSIS — C61 PROSTATE CANCER (HCC): ICD-10-CM

## 2024-02-06 DIAGNOSIS — I10 ESSENTIAL HYPERTENSION: Primary | ICD-10-CM

## 2024-02-06 DIAGNOSIS — Z00.00 HEALTHCARE MAINTENANCE: ICD-10-CM

## 2024-02-06 PROCEDURE — G0438 PPPS, INITIAL VISIT: HCPCS | Performed by: PHYSICIAN ASSISTANT

## 2024-02-06 PROCEDURE — 99214 OFFICE O/P EST MOD 30 MIN: CPT | Performed by: PHYSICIAN ASSISTANT

## 2024-02-06 RX ORDER — CLOBETASOL PROPIONATE 0.5 MG/G
CREAM TOPICAL
COMMUNITY
Start: 2023-11-22

## 2024-02-06 NOTE — PROGRESS NOTES
Assessment and Plan:     Patient Instructions   Assessment/plan:  1.  Benign essential hypertension-presently stable with lisinopril and hydrochlorothiazide, no medication changes.  2.  Hyperlipidemia-stable on lovastatin 40 mg daily.  3.  Prostate cancer-stable, following with urology.  Dr. Fry.  4.  Decreased platelets-stable at 149.  Following with Dr. Chatman, hematology.  5.  Stage IIIb chronic kidney disease-stable on ACE inhibitor therapy.  Recent GFR 31.  Patient is following with Dr. Mckenna, nephrology.  6.  Healthcare maintenance-annual Medicare wellness visit performed.  Discussed screening with ultrasound for AAA.  Patient is agreeable.  Patient declines COVID booster.  Patient declines pneumonia vaccination.  Shingles vaccine was reviewed with patient.  He is continuing healthy diet and exercise and currently going to the gym.    Problem List Items Addressed This Visit        Cardiovascular and Mediastinum    Essential hypertension - Primary    Relevant Orders    CBC and differential    Comprehensive metabolic panel    TSH, 3rd generation    Lipid Panel with Direct LDL reflex    Vitamin D 25 hydroxy    US abdominal aorta screening aaa       Genitourinary    Prostate cancer (HCC)    Relevant Orders    CBC and differential    Comprehensive metabolic panel    TSH, 3rd generation    Lipid Panel with Direct LDL reflex    Vitamin D 25 hydroxy    Stage 3b chronic kidney disease (HCC)    Relevant Orders    CBC and differential    Comprehensive metabolic panel    TSH, 3rd generation    Lipid Panel with Direct LDL reflex    Vitamin D 25 hydroxy    US abdominal aorta screening aaa       Other    Mixed hyperlipidemia    Relevant Orders    CBC and differential    Comprehensive metabolic panel    TSH, 3rd generation    Lipid Panel with Direct LDL reflex    Vitamin D 25 hydroxy    US abdominal aorta screening aaa    Platelets decreased (HCC)    Relevant Orders    CBC and differential    Comprehensive metabolic  panel    TSH, 3rd generation    Lipid Panel with Direct LDL reflex    Vitamin D 25 hydroxy   Other Visit Diagnoses     Healthcare maintenance                Depression Screening and Follow-up Plan: Patient was screened for depression during today's encounter. They screened negative with a PHQ-2 score of 0.      Preventive health issues were discussed with patient, and age appropriate screening tests were ordered as noted in patient's After Visit Summary.  Personalized health advice and appropriate referrals for health education or preventive services given if needed, as noted in patient's After Visit Summary.     History of Present Illness:     Patient presents for a Medicare Wellness Visit    HPI: This is a 68-year-old gentleman that presents to the office for follow-up of chronic health conditions as well as annual Medicare wellness visit.  He has a history of hypertension, hyperlipidemia, and prostate cancer.  His cholesterol has been stable with lovastatin and his blood pressure has been controlled with lisinopril and hydrochlorothiazide.  He does continue to follow with urology for prostate cancer and his PSA was elevated slightly above 8. He did have his appointment moved up to March to reevaluate this.  He also follows with Dr. Mckenna for chronic kidney disease.  GFR has been stable around 30.  He also continues to follow with hematology with history of low platelets and microcytosis.  His hemoglobin levels have been normal.  He is feeling well otherwise for the most part.  He complains of only difficulty returning to sleep after he wakes several times to urinate at nighttime.       Patient Care Team:  Alfa Reyes PA-C as PCP - General (Family Medicine)  DO Alfa Greenberg PA-C Katherine I Brown, PA-C Frank Jeremy Tamarkin, MD David Bub, MD as Endoscopist  Zay Chatman MD as Medical Oncologist (Hematology)     Review of Systems:     Review of Systems   Constitutional:  Negative  for chills, fatigue and fever.   HENT:  Negative for congestion, ear pain and sinus pressure.    Eyes:  Negative for visual disturbance.   Respiratory:  Negative for cough, chest tightness and shortness of breath.    Cardiovascular:  Negative for chest pain and palpitations.   Gastrointestinal:  Negative for diarrhea, nausea and vomiting.   Endocrine: Negative for polyuria.   Genitourinary:  Negative for dysuria and frequency.   Musculoskeletal:  Negative for arthralgias and myalgias.   Skin:  Negative for pallor and rash.   Neurological:  Negative for dizziness, weakness, light-headedness, numbness and headaches.   Psychiatric/Behavioral:  Negative for agitation, behavioral problems and sleep disturbance.    All other systems reviewed and are negative.       Problem List:     Patient Active Problem List   Diagnosis   • Abnormal RBC   • Prostate cancer (HCC)   • Colon polyps   • Mixed hyperlipidemia   • Essential hypertension   • Proteinuria   • Psoriasis   • Smoldering multiple myeloma   • Dermatitis   • Stage 3b chronic kidney disease (HCC)   • Platelets decreased (HCC)   • Scrotal cyst   • Bilateral varicoceles   • Acute gout due to renal impairment involving toe of left foot   • Muscle cramp      Past Medical and Surgical History:     Past Medical History:   Diagnosis Date   • Adenocarcinoma of prostate (HCC)     Last assessed 08/08/17   • Chronic kidney disease    • Colon polyp    • Gout    • Psoriasis      Past Surgical History:   Procedure Laterality Date   • COLONOSCOPY     • IR BIOPSY BONE MARROW  2/21/2023   • IR BIOPSY KIDNEY RANDOM  4/19/2023   • IR BIOPSY LYMPH NODE  6/13/2022   • IA COLONOSCOPY FLX DX W/COLLJ SPEC WHEN PFRMD N/A 08/31/2018    Procedure: COLONOSCOPY;  Surgeon: Brennon Aldana MD;  Location:  GI LAB;  Service: Colorectal   • PROSTATE BIOPSY  02/16/2018   • TONSILLECTOMY        Family History:     Family History   Problem Relation Age of Onset   • Arthritis Mother    • Hyperlipidemia Mother     • Hypertension Mother    • Diabetes Son       Social History:     Social History     Socioeconomic History   • Marital status: /Civil Union     Spouse name: None   • Number of children: None   • Years of education: None   • Highest education level: None   Occupational History   • Occupation:  retired IT and finance   Tobacco Use   • Smoking status: Never   • Smokeless tobacco: Never   Vaping Use   • Vaping status: Never Used   Substance and Sexual Activity   • Alcohol use: Yes     Comment: very rarely   • Drug use: Never   • Sexual activity: Yes     Partners: Female   Other Topics Concern   • None   Social History Narrative   • None     Social Determinants of Health     Financial Resource Strain: Low Risk  (2/6/2024)    Overall Financial Resource Strain (CARDIA)    • Difficulty of Paying Living Expenses: Not hard at all   Food Insecurity: Not on file   Transportation Needs: No Transportation Needs (2/6/2024)    PRAPARE - Transportation    • Lack of Transportation (Medical): No    • Lack of Transportation (Non-Medical): No   Physical Activity: Not on file   Stress: Not on file   Social Connections: Not on file   Intimate Partner Violence: Not on file   Housing Stability: Not on file      Medications and Allergies:     Current Outpatient Medications   Medication Sig Dispense Refill   • cholecalciferol (VITAMIN D3) 1,000 units tablet Take 1 tablet (1,000 Units total) by mouth daily     • clobetasol (TEMOVATE) 0.05 % cream APPLY TO AFFECTED SKIN 2 WEEKS ON AND ONE WEEK OFF.     • hydrochlorothiazide (HYDRODIURIL) 12.5 mg tablet Take 1 tablet (12.5 mg total) by mouth daily 90 tablet 3   • lisinopril (ZESTRIL) 40 mg tablet TAKE 1 TABLET BY MOUTH EVERY DAY 90 tablet 1   • lovastatin (MEVACOR) 40 MG tablet TAKE 1 TABLET BY MOUTH EVERY DAY 90 tablet 1   • Tremfya subcutaneous injection        No current facility-administered medications for this visit.     Allergies   Allergen Reactions   • Amlodipine Hives   •  Penicillins Hives   • Fenofibrate Itching and Rash   • Iodinated Contrast Media Rash      Immunizations:     Immunization History   Administered Date(s) Administered   • COVID-19 MODERNA VACC 0.25 ML IM BOOSTER 12/29/2021   • COVID-19 MODERNA VACC 0.5 ML IM 04/13/2021, 05/13/2021   • Influenza, recombinant, quadrivalent,injectable, preservative free 10/26/2020   • Tdap 03/30/2019      Health Maintenance:         Topic Date Due   • Colorectal Cancer Screening  06/07/2027   • Hepatitis C Screening  Completed         Topic Date Due   • Pneumococcal Vaccine: 65+ Years (1 - PCV) Never done   • COVID-19 Vaccine (3 - Moderna risk series) 01/26/2022   • Influenza Vaccine (1) 09/01/2023      Medicare Screening Tests and Risk Assessments:     Jovanni is here for his Subsequent Wellness visit.     Health Risk Assessment:   Patient rates overall health as very good. Patient feels that their physical health rating is same. Patient is very satisfied with their life. Eyesight was rated as same. Hearing was rated as same. Patient feels that their emotional and mental health rating is same. Patients states they are never, rarely angry. Patient states they are never, rarely unusually tired/fatigued. Pain experienced in the last 7 days has been none. Patient states that he has experienced no weight loss or gain in last 6 months.     Depression Screening:   PHQ-2 Score: 0      Fall Risk Screening:   In the past year, patient has experienced: no history of falling in past year      Home Safety:  Patient does not have trouble with stairs inside or outside of their home. Patient has working smoke alarms and has working carbon monoxide detector. Home safety hazards include: none.     Nutrition:   Current diet is Regular.     Medications:   Patient is currently taking over-the-counter supplements. OTC medications include: see medication list. Patient is able to manage medications.     Activities of Daily Living (ADLs)/Instrumental  "Activities of Daily Living (IADLs):   Walk and transfer into and out of bed and chair?: Yes  Dress and groom yourself?: Yes    Bathe or shower yourself?: Yes    Feed yourself? Yes  Do your laundry/housekeeping?: Yes  Manage your money, pay your bills and track your expenses?: Yes  Make your own meals?: Yes    Do your own shopping?: Yes    Previous Hospitalizations:   Any hospitalizations or ED visits within the last 12 months?: No      Advance Care Planning:   Living will: Yes    Durable POA for healthcare: Yes    Advanced directive: Yes      Cognitive Screening:   Provider or family/friend/caregiver concerned regarding cognition?: No    PREVENTIVE SCREENINGS      Cardiovascular Screening:    General: Screening Not Indicated and History Lipid Disorder      Diabetes Screening:     General: Screening Not Indicated and History Diabetes      Colorectal Cancer Screening:     General: Screening Current      Prostate Cancer Screening:    General: History Prostate Cancer      Osteoporosis Screening:    General: Screening Not Indicated      Abdominal Aortic Aneurysm (AAA) Screening:    Risk factors include: age between 65-76 yo        General: Risks and Benefits Discussed      Lung Cancer Screening:     General: Screening Not Indicated      Hepatitis C Screening:    General: Screening Current    Screening, Brief Intervention, and Referral to Treatment (SBIRT)    Screening  Typical number of drinks in a day: 0  Typical number of drinks in a week: 0  Interpretation: Low risk drinking behavior.    Single Item Drug Screening:  How often have you used an illegal drug (including marijuana) or a prescription medication for non-medical reasons in the past year? never    Single Item Drug Screen Score: 0  Interpretation: Negative screen for possible drug use disorder    No results found.     Physical Exam:     /70 (BP Location: Left arm, Patient Position: Sitting, Cuff Size: Standard)   Pulse 77   Ht 5' 11\" (1.803 m)   Wt " 84.8 kg (187 lb)   SpO2 95%   BMI 26.08 kg/m²     Physical Exam  Constitutional:       General: He is not in acute distress.     Appearance: He is well-developed.   HENT:      Head: Normocephalic and atraumatic.      Right Ear: Tympanic membrane normal.      Left Ear: Tympanic membrane normal.   Eyes:      Conjunctiva/sclera: Conjunctivae normal.   Cardiovascular:      Rate and Rhythm: Normal rate and regular rhythm.   Pulmonary:      Effort: Pulmonary effort is normal.   Abdominal:      General: Abdomen is flat. Bowel sounds are normal. There is no distension.      Palpations: Abdomen is soft. There is no mass.   Musculoskeletal:         General: Normal range of motion.      Cervical back: Normal range of motion.   Skin:     General: Skin is warm.      Findings: No rash.   Neurological:      Mental Status: He is alert and oriented to person, place, and time.   Psychiatric:         Mood and Affect: Mood normal.          Alfa Reyes PA-C

## 2024-02-06 NOTE — PATIENT INSTRUCTIONS
Assessment/plan:  1.  Benign essential hypertension-presently stable with lisinopril and hydrochlorothiazide, no medication changes.  2.  Hyperlipidemia-stable on lovastatin 40 mg daily.  3.  Prostate cancer-stable, following with urology.  Dr. Fry.  4.  Decreased platelets-stable at 149.  Following with Dr. Chatman, hematology.  5.  Stage IIIb chronic kidney disease-stable on ACE inhibitor therapy.  Recent GFR 31.  Patient is following with Dr. Mckenna, nephrology.  6.  Healthcare maintenance-annual Medicare wellness visit performed.  Discussed screening with ultrasound for AAA.  Patient is agreeable.  Patient declines COVID booster.  Patient declines pneumonia vaccination.  Shingles vaccine was reviewed with patient.  He is continuing healthy diet and exercise and currently going to the gym.

## 2024-02-07 ENCOUNTER — HOSPITAL ENCOUNTER (OUTPATIENT)
Dept: RADIOLOGY | Facility: HOSPITAL | Age: 69
Discharge: HOME | End: 2024-02-07
Payer: MEDICARE

## 2024-02-07 DIAGNOSIS — E78.2 MIXED HYPERLIPIDEMIA: ICD-10-CM

## 2024-02-07 DIAGNOSIS — I10 ESSENTIAL HYPERTENSION: ICD-10-CM

## 2024-02-07 DIAGNOSIS — N18.32 STAGE 3B CHRONIC KIDNEY DISEASE (HCC): ICD-10-CM

## 2024-02-07 PROCEDURE — 76706 US ABDL AORTA SCREEN AAA: CPT

## 2024-02-20 ENCOUNTER — APPOINTMENT (OUTPATIENT)
Dept: LAB | Facility: CLINIC | Age: 69
End: 2024-02-20
Payer: MEDICARE

## 2024-02-20 DIAGNOSIS — N18.32 STAGE 3B CHRONIC KIDNEY DISEASE (HCC): ICD-10-CM

## 2024-02-20 DIAGNOSIS — R80.8 OTHER PROTEINURIA: ICD-10-CM

## 2024-02-20 LAB
ANION GAP SERPL CALCULATED.3IONS-SCNC: 6 MMOL/L
BACTERIA UR QL AUTO: ABNORMAL /HPF
BILIRUB UR QL STRIP: NEGATIVE
BUN SERPL-MCNC: 39 MG/DL (ref 5–25)
CALCIUM SERPL-MCNC: 10.2 MG/DL (ref 8.4–10.2)
CHLORIDE SERPL-SCNC: 105 MMOL/L (ref 96–108)
CLARITY UR: CLEAR
CO2 SERPL-SCNC: 26 MMOL/L (ref 21–32)
COLOR UR: ABNORMAL
CREAT SERPL-MCNC: 2.47 MG/DL (ref 0.6–1.3)
CREAT UR-MCNC: 106.8 MG/DL
CREAT UR-MCNC: 106.8 MG/DL
GFR SERPL CREATININE-BSD FRML MDRD: 26 ML/MIN/1.73SQ M
GLUCOSE P FAST SERPL-MCNC: 83 MG/DL (ref 65–99)
GLUCOSE UR STRIP-MCNC: NEGATIVE MG/DL
HGB UR QL STRIP.AUTO: NEGATIVE
KETONES UR STRIP-MCNC: NEGATIVE MG/DL
LEUKOCYTE ESTERASE UR QL STRIP: NEGATIVE
MICROALBUMIN UR-MCNC: 346.5 MG/L
MICROALBUMIN/CREAT 24H UR: 324 MG/G CREATININE (ref 0–30)
NITRITE UR QL STRIP: NEGATIVE
NON-SQ EPI CELLS URNS QL MICRO: ABNORMAL /HPF
PH UR STRIP.AUTO: 5.5 [PH]
POTASSIUM SERPL-SCNC: 4.3 MMOL/L (ref 3.5–5.3)
PROT UR STRIP-MCNC: ABNORMAL MG/DL
PROT UR-MCNC: 49 MG/DL
PROT/CREAT UR: 0.46 MG/G{CREAT} (ref 0–0.1)
RBC #/AREA URNS AUTO: ABNORMAL /HPF
SODIUM SERPL-SCNC: 137 MMOL/L (ref 135–147)
SP GR UR STRIP.AUTO: 1.01 (ref 1–1.03)
UROBILINOGEN UR STRIP-ACNC: <2 MG/DL
WBC #/AREA URNS AUTO: ABNORMAL /HPF

## 2024-02-20 PROCEDURE — 80048 BASIC METABOLIC PNL TOTAL CA: CPT

## 2024-02-20 PROCEDURE — 82570 ASSAY OF URINE CREATININE: CPT

## 2024-02-20 PROCEDURE — 81001 URINALYSIS AUTO W/SCOPE: CPT

## 2024-02-20 PROCEDURE — 82043 UR ALBUMIN QUANTITATIVE: CPT

## 2024-02-20 PROCEDURE — 84156 ASSAY OF PROTEIN URINE: CPT

## 2024-02-20 PROCEDURE — 36415 COLL VENOUS BLD VENIPUNCTURE: CPT

## 2024-02-28 ENCOUNTER — OFFICE VISIT (OUTPATIENT)
Dept: NEPHROLOGY | Facility: CLINIC | Age: 69
End: 2024-02-28
Payer: MEDICARE

## 2024-02-28 VITALS
WEIGHT: 184 LBS | SYSTOLIC BLOOD PRESSURE: 122 MMHG | DIASTOLIC BLOOD PRESSURE: 82 MMHG | HEART RATE: 74 BPM | BODY MASS INDEX: 25.76 KG/M2 | HEIGHT: 71 IN

## 2024-02-28 DIAGNOSIS — M10.372 ACUTE GOUT DUE TO RENAL IMPAIRMENT INVOLVING TOE OF LEFT FOOT: ICD-10-CM

## 2024-02-28 DIAGNOSIS — I10 ESSENTIAL HYPERTENSION: ICD-10-CM

## 2024-02-28 DIAGNOSIS — R80.8 OTHER PROTEINURIA: ICD-10-CM

## 2024-02-28 DIAGNOSIS — D47.2 SMOLDERING MULTIPLE MYELOMA: ICD-10-CM

## 2024-02-28 DIAGNOSIS — N18.4 CHRONIC KIDNEY DISEASE, STAGE IV (SEVERE) (HCC): Primary | ICD-10-CM

## 2024-02-28 PROCEDURE — 99214 OFFICE O/P EST MOD 30 MIN: CPT | Performed by: INTERNAL MEDICINE

## 2024-02-28 NOTE — PROGRESS NOTES
NEPHROLOGY OUTPATIENT PROGRESS NOTE   Jovanni Flor 68 y.o. male MRN: [id number]  DATE: 2/28/2024  Reason for visit:   Chief Complaint   Patient presents with    Follow-up     CKD3b        Patient Instructions   1. Chronic kidney disease stage 3b/4 in setting of prostatic adenocarcinoma as well as smoldering myeloma, CKD likely d/t biopsy proven(4/19/23) hypertensive arterionephrosclerosis  - eGFR 26ml/min per CKD EPI equation. Last sCr 2.47 as of 2/20/24.   -sCr appears to be at a new baseline of 1.9-2.2 since June 2022. Remains stable.   -continue to avoid nonsteroidals(motrin, aleve, advil, ibuprofen, toradol, naproxen, celebrex, indomethacin and meloxicam)  -renal u/s August 2017 shows echogenic kidneys with b/l renal cysts. The cause of this is unclear.  -last urinalysis shows 1+protein in urine, without blood and UpCr 0.46g-proteinuria improving  -Check BMP in 3 months as well as urine protein:Cr and microalb:Cr   -s/p CKD education class     2. HTN - BP well controlled. Have correlated home BP cuff in past which appears to be accurate. Continue low salt diet.  -on lisinopril 40mg daily, HCTZ 12.5mg daily.   -Goal BP < 130/80. Call office if BP at home persistently above goal.      3. Proteinuria - noted on UA. +1 protein on UA and UpCr 0.46g - has smoldering myeloma. 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    -Will continue lisinopril 40mg daily. Monitor UpCr and microalb:Cr     4. Prostate cancer - Undergoing active surveillance by urology. Last PSA 8.17 - has increased, MRI showed BPH     5. microscopic hematuria - does not appear to be glomerular in origin and UA shows no RBCs, resolved     6. Hypercalcemia, mild  ? D/t monoclonal gammopathy vs prostate ca vs primary hyperparathyroidism - improved, corrected calcium 10.2.  On cholecalciferol 2000 units daily. Last PTH normal at 86. VIt D normal 37.9.      7. Kappa light chain smoldering multiple myeloma - follows with hematology,   Lurdes     8. Gout - had acute episode, resolved after steroid shot, had uric acid level of 11 in April 2023. Repeat uric acid 10.5 in Nov. 2023.      9. myalgias- on statin, CK level elevated, will d/w primary team     10. Psoriasis - on tremfya per dermatology     RTC in 4 months.       Jovanni was seen today for follow-up.    Diagnoses and all orders for this visit:    Chronic kidney disease, stage IV (severe) ([facility])  -     Basic metabolic panel; Future  -     Urinalysis with microscopic; Future  -     Protein / creatinine ratio, urine; Future  -     Albumin / creatinine urine ratio; Future    Essential hypertension    Acute gout due to renal impairment involving toe of left foot    Other proteinuria    Smoldering multiple myeloma        Assessment/Plan:  1. Chronic kidney disease stage 3b in setting of prostatic adenocarcinoma as well as smoldering myeloma, CKD likely d/t biopsy proven(4/19/23) hypertensive arterionephrosclerosis  - eGFR 26ml/min per CKD EPI equation. Last sCr 2.47 as of 2/20/24.   -sCr appears to be at a new baseline of 1.9-2.2 since June 2022. Remains stable.   -continue to avoid nonsteroidals(motrin, aleve, advil, ibuprofen, toradol, naproxen, celebrex, indomethacin and meloxicam)  -renal u/s August 2017 shows echogenic kidneys with b/l renal cysts. The cause of this is unclear.  -last urinalysis shows 1+protein in urine, without blood and UpCr 0.46g-proteinuria improving  -Check BMP in 3 months as well as urine protein:Cr and microalb:Cr   -s/p CKD education class     2. HTN - BP well controlled. Have correlated home BP cuff in past which appears to be accurate. Continue low salt diet.  -on lisinopril 40mg daily, HCTZ 12.5mg daily.   -Goal BP < 130/80. Call office if BP at home persistently above goal.      3. Proteinuria - noted on UA. +1 protein on UA and UpCr 0.46g - has smoldering myeloma. 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    -Will continue lisinopril 40mg  daily. Monitor UpCr and microalb:Cr     4. Prostate cancer - Undergoing active surveillance by urology. Last PSA 8.17 - has increased, MRI showed BPH     5. microscopic hematuria - does not appear to be glomerular in origin and UA shows no RBCs, resolved     6. Hypercalcemia, mild  ? D/t monoclonal gammopathy vs prostate ca vs primary hyperparathyroidism - improved, corrected calcium 10.2.  On cholecalciferol 2000 units daily. Last PTH normal at 86. VIt D normal 37.9.      7. Kappa light chain smoldering multiple myeloma - follows with hematology, Dr. Chatman     8. Gout - had acute episode, resolved after steroid shot, had uric acid level of 11 in April 2023. Repeat uric acid 10.5 in Nov. 2023.      9. myalgias- on statin, CK level elevated, will d/w primary team     10. Psoriasis - on tremfya per dermatology     RTC in 4 months.       SUBJECTIVE / INTERVAL HISTORY:  68 y.o. male presents in follow up of CKD.     Jovanni lFor denies any recent illness/hospitalizations/medication changes since last office visit.    Denies NSAID use.  Has started going to the gym.  Does hydrate.  No psoriasis or gout flares.  HTN - BP well controlled at home    Review of Systems   Constitutional:  Negative for chills and fever.   HENT:  Negative for sore throat.    Eyes:  Negative for visual disturbance.   Respiratory:  Negative for cough and shortness of breath.    Cardiovascular:  Negative for chest pain and leg swelling.   Gastrointestinal:  Negative for abdominal pain, constipation, diarrhea, nausea and vomiting.   Endocrine: Negative for polyuria.   Genitourinary:  Positive for difficulty urinating (in the mornings, improves during the day). Negative for decreased urine volume, dysuria and hematuria.   Musculoskeletal:  Negative for back pain and myalgias.   Skin:  Negative for rash.   Neurological:  Negative for dizziness, light-headedness and numbness.   Psychiatric/Behavioral:  Negative for confusion.        OBJECTIVE:  BP  "122/82 (BP Location: Left arm, Patient Position: Sitting, Cuff Size: Adult)   Pulse 74   Ht 5' 11\" (1.803 m)   Wt 83.5 kg (184 lb)   BMI 25.66 kg/m²  Body mass index is 25.66 kg/m².    Physical exam:  Physical Exam  Vitals reviewed.   Constitutional:       General: He is not in acute distress.     Appearance: Normal appearance. He is well-developed. He is not diaphoretic.   HENT:      Head: Normocephalic and atraumatic.      Nose: Nose normal.      Mouth/Throat:      Mouth: Mucous membranes are moist.      Pharynx: No oropharyngeal exudate.   Eyes:      General: No scleral icterus.        Right eye: No discharge.         Left eye: No discharge.      Comments: eyeglasses   Neck:      Thyroid: No thyromegaly.   Cardiovascular:      Rate and Rhythm: Normal rate and regular rhythm.      Heart sounds: Normal heart sounds.   Pulmonary:      Effort: Pulmonary effort is normal.      Breath sounds: Normal breath sounds. No wheezing or rales.   Abdominal:      General: Bowel sounds are normal. There is no distension.      Palpations: Abdomen is soft.      Tenderness: There is no abdominal tenderness.   Musculoskeletal:         General: No swelling. Normal range of motion.      Cervical back: Neck supple.   Lymphadenopathy:      Cervical: No cervical adenopathy.   Skin:     General: Skin is warm and dry.      Coloration: Skin is not jaundiced.      Findings: No rash.   Neurological:      General: No focal deficit present.      Mental Status: He is alert.      Comments: awake   Psychiatric:         Mood and Affect: Mood normal.         Behavior: Behavior normal.         Medications:    Current Outpatient Medications:     cholecalciferol (VITAMIN D3) 1,000 units tablet, Take 1 tablet (1,000 Units total) by mouth daily, Disp: , Rfl:     clobetasol (TEMOVATE) 0.05 % cream, APPLY TO AFFECTED SKIN 2 WEEKS ON AND ONE WEEK OFF., Disp: , Rfl:     hydrochlorothiazide (HYDRODIURIL) 12.5 mg tablet, Take 1 tablet (12.5 mg total) by " "mouth daily, Disp: 90 tablet, Rfl: 3    lisinopril (ZESTRIL) 40 mg tablet, TAKE 1 TABLET BY MOUTH EVERY DAY, Disp: 90 tablet, Rfl: 1    lovastatin (MEVACOR) 40 MG tablet, TAKE 1 TABLET BY MOUTH EVERY DAY, Disp: 90 tablet, Rfl: 1    Tremfya subcutaneous injection, every 3 (three) months, Disp: , Rfl:     Allergies:  Allergies as of 02/28/2024 - Reviewed 02/28/2024   Allergen Reaction Noted    Amlodipine Hives 07/23/2018    Penicillins Hives 08/29/2017    Fenofibrate Itching and Rash 07/10/2015    Iodinated contrast media Rash 06/06/2022       The following portions of the patient's history were reviewed and updated as appropriate: past family history, past surgical history and problem list.    Laboratory Results:  Lab Results   Component Value Date    SODIUM 137 02/20/2024    K 4.3 02/20/2024     02/20/2024    CO2 26 02/20/2024    BUN 39 (H) 02/20/2024    CREATININE 2.47 (H) 02/20/2024    GLUC 91 10/26/2023    CALCIUM 10.2 02/20/2024        Lab Results   Component Value Date    PTH 86.0 07/07/2023    CALCIUM 10.2 02/20/2024    CAION 5.8 (H) 11/30/2017    CAION 5.8 (H) 11/30/2017    CAION 5.8 (H) 11/30/2017    CAION 5.8 (H) 11/30/2017    PHOS 3.3 07/07/2023       Portions of the record may have been created with voice recognition software.  Occasional wrong word or \"sound a like\" substitutions may have occurred due to the inherent limitations of voice recognition software.  Read the chart carefully and recognize, using context, where substitutions have occurred.  "

## 2024-02-28 NOTE — PATIENT INSTRUCTIONS
1. Chronic kidney disease stage 3b/4 in setting of prostatic adenocarcinoma as well as smoldering myeloma, CKD likely d/t biopsy proven(4/19/23) hypertensive arterionephrosclerosis  - eGFR 26ml/min per CKD EPI equation. Last sCr 2.47 as of 2/20/24.   -sCr appears to be at a new baseline of 1.9-2.2 since June 2022. Remains stable.   -continue to avoid nonsteroidals(motrin, aleve, advil, ibuprofen, toradol, naproxen, celebrex, indomethacin and meloxicam)  -renal u/s August 2017 shows echogenic kidneys with b/l renal cysts. The cause of this is unclear.  -last urinalysis shows 1+protein in urine, without blood and UpCr 0.46g-proteinuria improving  -Check BMP in 3 months as well as urine protein:Cr and microalb:Cr   -s/p CKD education class     2. HTN - BP well controlled. Have correlated home BP cuff in past which appears to be accurate. Continue low salt diet.  -on lisinopril 40mg daily, HCTZ 12.5mg daily.   -Goal BP < 130/80. Call office if BP at home persistently above goal.      3. Proteinuria - noted on UA. +1 protein on UA and UpCr 0.46g - has smoldering myeloma. 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    -Will continue lisinopril 40mg daily. Monitor UpCr and microalb:Cr     4. Prostate cancer - Undergoing active surveillance by urology. Last PSA 8.17 - has increased, MRI showed BPH     5. microscopic hematuria - does not appear to be glomerular in origin and UA shows no RBCs, resolved     6. Hypercalcemia, mild  ? D/t monoclonal gammopathy vs prostate ca vs primary hyperparathyroidism - improved, corrected calcium 10.2.  On cholecalciferol 2000 units daily. Last PTH normal at 86. VIt D normal 37.9.      7. Kappa light chain smoldering multiple myeloma - follows with hematology, Dr. Chatman     8. Gout - had acute episode, resolved after steroid shot, had uric acid level of 11 in April 2023. Repeat uric acid 10.5 in Nov. 2023.      9. myalgias- on statin, CK level elevated, will d/w primary  team     10. Psoriasis - on tremfya per dermatology     RTC in 4 months.

## 2024-03-03 DIAGNOSIS — E78.2 MIXED HYPERLIPIDEMIA: ICD-10-CM

## 2024-03-03 RX ORDER — LOVASTATIN 40 MG/1
TABLET ORAL
Qty: 90 TABLET | Refills: 1 | Status: SHIPPED | OUTPATIENT
Start: 2024-03-03

## 2024-03-06 ENCOUNTER — OFFICE VISIT (OUTPATIENT)
Dept: PODIATRY | Facility: CLINIC | Age: 69
End: 2024-03-06
Payer: MEDICARE

## 2024-03-06 VITALS
HEART RATE: 78 BPM | BODY MASS INDEX: 25.81 KG/M2 | HEIGHT: 71 IN | WEIGHT: 184.4 LBS | SYSTOLIC BLOOD PRESSURE: 122 MMHG | DIASTOLIC BLOOD PRESSURE: 73 MMHG

## 2024-03-06 DIAGNOSIS — M10.371 ACUTE GOUT DUE TO RENAL IMPAIRMENT INVOLVING RIGHT FOOT: ICD-10-CM

## 2024-03-06 DIAGNOSIS — M79.671 RIGHT FOOT PAIN: Primary | ICD-10-CM

## 2024-03-06 PROCEDURE — 99213 OFFICE O/P EST LOW 20 MIN: CPT | Performed by: PODIATRIST

## 2024-03-06 PROCEDURE — 20600 DRAIN/INJ JOINT/BURSA W/O US: CPT

## 2024-03-06 RX ORDER — LIDOCAINE HYDROCHLORIDE 10 MG/ML
0.5 INJECTION, SOLUTION INFILTRATION; PERINEURAL ONCE
Status: COMPLETED | OUTPATIENT
Start: 2024-03-06 | End: 2024-03-06

## 2024-03-06 RX ORDER — DEXAMETHASONE SODIUM PHOSPHATE 4 MG/ML
2 INJECTION, SOLUTION INTRA-ARTICULAR; INTRALESIONAL; INTRAMUSCULAR; INTRAVENOUS; SOFT TISSUE ONCE
Status: COMPLETED | OUTPATIENT
Start: 2024-03-06 | End: 2024-03-06

## 2024-03-06 RX ADMIN — LIDOCAINE HYDROCHLORIDE 0.5 ML: 10 INJECTION, SOLUTION INFILTRATION; PERINEURAL at 12:25

## 2024-03-06 RX ADMIN — DEXAMETHASONE SODIUM PHOSPHATE 2 MG: 4 INJECTION, SOLUTION INTRA-ARTICULAR; INTRALESIONAL; INTRAMUSCULAR; INTRAVENOUS; SOFT TISSUE at 12:25

## 2024-03-06 NOTE — PATIENT INSTRUCTIONS
Check with primary care doctor about medication to lower uric acid.  For example Allopurinol or Uloric.

## 2024-03-11 ENCOUNTER — TELEPHONE (OUTPATIENT)
Age: 69
End: 2024-03-11

## 2024-03-11 ENCOUNTER — TELEPHONE (OUTPATIENT)
Dept: NEPHROLOGY | Facility: CLINIC | Age: 69
End: 2024-03-11

## 2024-03-11 ENCOUNTER — APPOINTMENT (OUTPATIENT)
Dept: LAB | Facility: CLINIC | Age: 69
End: 2024-03-11
Payer: MEDICARE

## 2024-03-11 DIAGNOSIS — M10.371 ACUTE GOUT DUE TO RENAL IMPAIRMENT INVOLVING RIGHT FOOT: Primary | ICD-10-CM

## 2024-03-11 DIAGNOSIS — R97.20 RISING PSA LEVEL: ICD-10-CM

## 2024-03-11 LAB — PSA SERPL-MCNC: 6.16 NG/ML (ref 0–4)

## 2024-03-11 PROCEDURE — 84153 ASSAY OF PSA TOTAL: CPT

## 2024-03-11 PROCEDURE — 36415 COLL VENOUS BLD VENIPUNCTURE: CPT

## 2024-03-11 RX ORDER — COLCHICINE 0.6 MG/1
0.6 TABLET ORAL DAILY
Qty: 3 TABLET | Refills: 0 | Status: SHIPPED | OUTPATIENT
Start: 2024-03-11

## 2024-03-11 NOTE — TELEPHONE ENCOUNTER
Caller: Jovanni Flor    Doctor/Office: Dr. Dhillon/Chun    #: [phone]    Escalation: Medication/ told Jovanni to call back if he wanted the RX's  Was going to prescribe at his . He now wants those Rx's sent to [facility] in [place]. Please call back after they have been sent. Thanks

## 2024-03-11 NOTE — TELEPHONE ENCOUNTER
Received a call from patient stating that his Podiatry Dr. Dhillon prescribe colchicine 0.6mg but patient want to know is okay for him to take colchicine. Please advise.

## 2024-03-11 NOTE — TELEPHONE ENCOUNTER
Notify patient I sent over medication to his pharmacy.  He should call back if this is not effective.       Make sure that he speaks with his primary care doctor regarding long-term management of uric acid.

## 2024-03-19 ENCOUNTER — OFFICE VISIT (OUTPATIENT)
Dept: UROLOGY | Facility: AMBULATORY SURGERY CENTER | Age: 69
End: 2024-03-19
Payer: MEDICARE

## 2024-03-19 VITALS
SYSTOLIC BLOOD PRESSURE: 124 MMHG | WEIGHT: 184 LBS | RESPIRATION RATE: 18 BRPM | HEART RATE: 72 BPM | BODY MASS INDEX: 25.76 KG/M2 | DIASTOLIC BLOOD PRESSURE: 78 MMHG | OXYGEN SATURATION: 90 % | HEIGHT: 71 IN

## 2024-03-19 DIAGNOSIS — N52.9 ERECTILE DYSFUNCTION, UNSPECIFIED ERECTILE DYSFUNCTION TYPE: Primary | ICD-10-CM

## 2024-03-19 PROCEDURE — 99214 OFFICE O/P EST MOD 30 MIN: CPT | Performed by: UROLOGY

## 2024-03-19 RX ORDER — TADALAFIL 20 MG/1
20 TABLET ORAL DAILY PRN
Qty: 15 TABLET | Refills: 3 | Status: SHIPPED | OUTPATIENT
Start: 2024-03-19

## 2024-03-19 NOTE — PROGRESS NOTES
3/19/2024    Jovanni Flor  1955  [id number]        Assessment  History of small volume [place] 6 prostate cancer on active surveillance, multiple myeloma with iliac adenopathy, erectile dysfunction       Discussion  The patient and I discussed the recent results of his last multiparametric MRI of the prostate which was performed in December 2023. We discussed that his PSA had risen to as high as 8.17 in January 2024, but has since dropped back down to 6.16. Additionally, we discussed that the multiparametric MRI of the prostate graded his PI-RADS scored as category 2. I reassured the patient that in light of his decreasing PSA as well as his PI-RADS score of 2, he should continue on active surveillance. The patient was agreeable to this plan and understands the risks associated with active surveillance. The patient divulged some degree of sexual dysfunction. We discussed the options of taking an oral medication such as Cialis or Viagra, using a penis pump, or penile injection therapy. After reviewing the options, the patient stated that he felt most comfortable with Cialis. We discussed the side effects of facial flushing, headache, and priapism. I underlined the importance of going to the emergency department if he were to have an erection more than an hour and becomes extremely painful. We discussed that Cialis allows for more spontaneity due to not being affect by food consumption and should be taken one to two hours prior to wanting the maximum effect. The patient agreed to starting Cialis 20 mg as needed for erections.  Agree with continued active surveillance with 6-month follow-up with PSA.        History of Present Illness  68 y.o. male with a history of multiple myeloma as well last stage IIIB chronic kidney disease. The patient was originally diagnosed with small volume [place] 6 prostate cancer in 2012 more than 10 years ago. He has been on active surveillance since that time with a relatively  minimal change in his PSA. In December 2022 he underwent a multiparametric MRI part of active surveillance. Prior to this MRI, he was found to have a 3 cm Iliac lymph node. The patient underwent a CT-Guided needle biopsy and pathology was consistent with a reactive lymph node. He was referred to medical oncology and a diagnosis of multiple myeloma was made. Today he returns for routine follow up, PSA evaluation, and for results of his recent MRI of the prostate. He underwent another MRI of his prostate for continued surveillance in December 2023. The most recent MRI found a 113 gram prostate and a PI-RADS grade 2 with no extraprostatic tumor, seminal vesicle invasion, or pelvic osseous metastatic disease. The patient's last PSA was found to be 6.16, which is down from his previous PSA of 8.17 in January 2024. The patient did express interesting in erectile dysfunction medications. The patient stated that he has struggled with a strong erection after stimulation.           AUA Symptom Score      Review of Systems  Review of Systems   Constitutional: Negative.  Negative for chills and fever.   HENT: Negative.  Negative for ear pain and sore throat.    Eyes: Negative.  Negative for pain and visual disturbance.   Respiratory: Negative.  Negative for cough and shortness of breath.    Cardiovascular: Negative.  Negative for chest pain and palpitations.   Gastrointestinal: Negative.  Negative for abdominal pain and vomiting.   Endocrine: Negative.    Genitourinary:  Negative for dysuria and hematuria.   Musculoskeletal: Negative.  Negative for arthralgias and back pain.   Skin: Negative.  Negative for color change and rash.   Allergic/Immunologic: Negative.    Neurological: Negative.  Negative for seizures and syncope.   Hematological: Negative.    Psychiatric/Behavioral: Negative.     All other systems reviewed and are negative.        Past Medical History  Past Medical History:   Diagnosis Date    Adenocarcinoma of  prostate (HCC)     Last assessed 08/08/17    Chronic kidney disease     Colon polyp     Gout     Psoriasis        Past Social History  Past Surgical History:   Procedure Laterality Date    COLONOSCOPY      IR BIOPSY BONE MARROW  2/21/2023    IR BIOPSY KIDNEY RANDOM  4/19/2023    IR BIOPSY LYMPH NODE  6/13/2022    TX COLONOSCOPY FLX DX W/COLLJ SPEC WHEN PFRMD N/A 08/31/2018    Procedure: COLONOSCOPY;  Surgeon: Brennon Aldana MD;  Location:  GI LAB;  Service: Colorectal    PROSTATE BIOPSY  02/16/2018    TONSILLECTOMY         Past Family History  Family History   Problem Relation Age of Onset    Arthritis Mother     Hyperlipidemia Mother     Hypertension Mother     Diabetes Son        Past Social history  Social History     Socioeconomic History    Marital status: /Civil Union     Spouse name: Not on file    Number of children: Not on file    Years of education: Not on file    Highest education level: Not on file   Occupational History    Occupation:  retired IT and finance   Tobacco Use    Smoking status: Never    Smokeless tobacco: Never   Vaping Use    Vaping status: Never Used   Substance and Sexual Activity    Alcohol use: Yes     Comment: very rarely    Drug use: Never    Sexual activity: Yes     Partners: Female   Other Topics Concern    Not on file   Social History Narrative    Not on file     Social Determinants of Health     Financial Resource Strain: Low Risk  (2/6/2024)    Overall Financial Resource Strain (CARDIA)     Difficulty of Paying Living Expenses: Not hard at all   Food Insecurity: Not on file   Transportation Needs: No Transportation Needs (2/6/2024)    PRAPARE - Transportation     Lack of Transportation (Medical): No     Lack of Transportation (Non-Medical): No   Physical Activity: Not on file   Stress: Not on file   Social Connections: Not on file   Intimate Partner Violence: Not on file   Housing Stability: Not on file       Current Medications  Current Outpatient Medications   Medication  "Sig Dispense Refill    cholecalciferol (VITAMIN D3) 1,000 units tablet Take 1 tablet (1,000 Units total) by mouth daily      clobetasol (TEMOVATE) 0.05 % cream APPLY TO AFFECTED SKIN 2 WEEKS ON AND ONE WEEK OFF.      colchicine (COLCRYS) 0.6 mg tablet Take 1 tablet (0.6 mg total) by mouth daily 3 tablet 0    hydrochlorothiazide (HYDRODIURIL) 12.5 mg tablet Take 1 tablet (12.5 mg total) by mouth daily 90 tablet 3    lisinopril (ZESTRIL) 40 mg tablet TAKE 1 TABLET BY MOUTH EVERY DAY 90 tablet 1    lovastatin (MEVACOR) 40 MG tablet TAKE 1 TABLET BY MOUTH EVERY DAY 90 tablet 1    tadalafil (CIALIS) 20 MG tablet Take 1 tablet (20 mg total) by mouth daily as needed for erectile dysfunction 15 tablet 3    Tremfya subcutaneous injection every 3 (three) months       No current facility-administered medications for this visit.       Allergies  Allergies   Allergen Reactions    Amlodipine Hives    Penicillins Hives    Fenofibrate Itching and Rash    Iodinated Contrast Media Rash       Past Medical History, Social History, Family History, medications and allergies were reviewed.    Vitals  Vitals:    03/19/24 1118   BP: 124/78   BP Location: Left arm   Patient Position: Sitting   Cuff Size: Standard   Pulse: 72   Resp: 18   SpO2: 90%   Weight: 83.5 kg (184 lb)   Height: 5' 11\" (1.803 m)       Physical Exam  Physical Exam  Constitutional:       General: He is not in acute distress.     Appearance: Normal appearance.   HENT:      Head: Normocephalic and atraumatic.   Abdominal:      General: Abdomen is flat. There is no distension.      Palpations: Abdomen is soft.      Tenderness: There is no abdominal tenderness.   Skin:     Coloration: Skin is not jaundiced.   Neurological:      Mental Status: He is alert.   Psychiatric:         Mood and Affect: Mood normal.         Behavior: Behavior normal.             Results  Lab Results   Component Value Date    PSA 6.16 (H) 03/11/2024    PSA 8.17 (H) 01/30/2024    PSA 6.35 (H) 09/25/2023 "     Lab Results   Component Value Date    CALCIUM 10.2 02/20/2024     11/30/2017     11/30/2017     11/30/2017     11/30/2017    K 4.3 02/20/2024    CO2 26 02/20/2024     02/20/2024    BUN 39 (H) 02/20/2024    CREATININE 2.47 (H) 02/20/2024     Lab Results   Component Value Date    WBC 6.84 01/30/2024    HGB 14.1 01/30/2024    HCT 48.2 01/30/2024    MCV 86 01/30/2024     01/30/2024         Office Urine Dip  No results found for this or any previous visit (from the past 1 hour(s)).]

## 2024-03-22 ENCOUNTER — TELEPHONE (OUTPATIENT)
Dept: OBGYN CLINIC | Facility: HOSPITAL | Age: 69
End: 2024-03-22

## 2024-03-22 DIAGNOSIS — M10.372 ACUTE GOUT DUE TO RENAL IMPAIRMENT INVOLVING TOE OF LEFT FOOT: Primary | ICD-10-CM

## 2024-03-22 RX ORDER — METHYLPREDNISOLONE 4 MG/1
TABLET ORAL
Qty: 1 EACH | Refills: 0 | Status: SHIPPED | OUTPATIENT
Start: 2024-03-22

## 2024-03-22 NOTE — TELEPHONE ENCOUNTER
Notify patient I will send him over a Medrol Dosepak steroid taper which should help with the symptoms.  Did he speak with his primary care doctor regarding long-term management of uric acid elevation?

## 2024-03-22 NOTE — TELEPHONE ENCOUNTER
Caller: Patient    Doctor: Jacquie    Reason for call: Patient reporting that injection and Colchicine did not help with gout symptoms.  Please advise.    Call back#: [phone]

## 2024-03-25 ENCOUNTER — TELEPHONE (OUTPATIENT)
Age: 69
End: 2024-03-25

## 2024-03-25 NOTE — TELEPHONE ENCOUNTER
Pt called  he wanted to let you know the name of the med his podiatrist put him on, due to kidney risk.  Methylprednisone 4 mg dose pk. Pt is to take 6 1st day and taper down daily till gone. Is this ok to take? Please let the pt know

## 2024-03-25 NOTE — TELEPHONE ENCOUNTER
Patient called again to see if the doctor had a chance to see his message.  He apologized for calling again just stated his was in a lot of pain but didn't want to go ahead with the medication without the doctors approval.

## 2024-04-16 ENCOUNTER — OFFICE VISIT (OUTPATIENT)
Dept: PODIATRY | Facility: CLINIC | Age: 69
End: 2024-04-16
Payer: MEDICARE

## 2024-04-16 VITALS
BODY MASS INDEX: 25.93 KG/M2 | WEIGHT: 185.2 LBS | HEIGHT: 71 IN | DIASTOLIC BLOOD PRESSURE: 73 MMHG | SYSTOLIC BLOOD PRESSURE: 120 MMHG | HEART RATE: 92 BPM

## 2024-04-16 DIAGNOSIS — M1A.3720 CHRONIC GOUT DUE TO RENAL IMPAIRMENT INVOLVING TOE OF LEFT FOOT WITHOUT TOPHUS: Primary | ICD-10-CM

## 2024-04-16 PROCEDURE — 20600 DRAIN/INJ JOINT/BURSA W/O US: CPT | Performed by: PODIATRIST

## 2024-04-16 PROCEDURE — 99213 OFFICE O/P EST LOW 20 MIN: CPT | Performed by: PODIATRIST

## 2024-04-16 RX ORDER — TRIAMCINOLONE ACETONIDE 40 MG/ML
20 INJECTION, SUSPENSION INTRA-ARTICULAR; INTRAMUSCULAR ONCE
Status: COMPLETED | OUTPATIENT
Start: 2024-04-16 | End: 2024-04-16

## 2024-04-16 RX ORDER — LIDOCAINE HYDROCHLORIDE 10 MG/ML
0.5 INJECTION, SOLUTION EPIDURAL; INFILTRATION; INTRACAUDAL; PERINEURAL ONCE
Status: COMPLETED | OUTPATIENT
Start: 2024-04-16 | End: 2024-04-16

## 2024-04-16 RX ORDER — DEXAMETHASONE SODIUM PHOSPHATE 4 MG/ML
2 INJECTION, SOLUTION INTRA-ARTICULAR; INTRALESIONAL; INTRAMUSCULAR; INTRAVENOUS; SOFT TISSUE ONCE
Status: COMPLETED | OUTPATIENT
Start: 2024-04-16 | End: 2024-04-16

## 2024-04-16 RX ADMIN — LIDOCAINE HYDROCHLORIDE 0.5 ML: 10 INJECTION, SOLUTION EPIDURAL; INFILTRATION; INTRACAUDAL; PERINEURAL at 08:56

## 2024-04-16 RX ADMIN — TRIAMCINOLONE ACETONIDE 20 MG: 40 INJECTION, SUSPENSION INTRA-ARTICULAR; INTRAMUSCULAR at 09:09

## 2024-04-16 RX ADMIN — DEXAMETHASONE SODIUM PHOSPHATE 2 MG: 4 INJECTION, SOLUTION INTRA-ARTICULAR; INTRALESIONAL; INTRAMUSCULAR; INTRAVENOUS; SOFT TISSUE at 08:53

## 2024-04-16 NOTE — PROGRESS NOTES
Name: Jovanni Flor      : 1955      MRN: [id number]  Encounter Provider: Dimitry Dhillon DPM  Encounter Date: 2024   Encounter department: [facility] [place]    Assessment & Plan     1. 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  I like to refer him to rheumatology for evaluation and treatment of this.    Discussed with him this will help decrease chance of damage to additional joints.    I did perform an injection today to the left first metatarsophalangeal joint to help with his acute pain.    Small joint arthrocentesis: L great MTP  Universal Protocol:  Consent: Verbal consent obtained.  Risks and benefits: risks, benefits and alternatives were discussed  Consent given by: patient  Patient understanding: patient states understanding of the procedure being performed  Patient identity confirmed: verbally with patient  Supporting Documentation  Indications: pain   Procedure Details  Location: great toe - L great MTP  Preparation: Patient was prepped and draped in the usual sterile fashion  Needle size: 25 G  Ultrasound guidance: no  Approach: medial    Patient tolerance: patient tolerated the procedure well with no immediate complications  Dressing:  Sterile dressing applied    Area was prepped with alcohol wipe.  Ethyl Chloride was sprayed on skin to aid in pain relief with injection.  Hemostasis achieved to the area.  Injection completed without incident, dry sterile dressing applied.  Patient should leave this intact for twenty four hours and then can remove. If the patient notices  "any redness, swelling or any signs or symptoms of infection notify the office immediately.                  Return in about 2 months (around 6/16/2024).    Subjective     Patient returns for follow-up on right foot first metatarsophalangeal joint gout attack.  He did well with the prednisone did not have good response with colchicine.  He has yet to speak with his primary care doctor regarding long-term uric acid management.  I gave him the uric acid diet information.  He is complaining of pain and discomfort to the first metatarsophalangeal joint on the left side now.      Review of Systems   Constitutional:  Negative for chills and fever.   Respiratory:  Negative for shortness of breath and wheezing.    Cardiovascular:  Negative for chest pain and leg swelling.   Gastrointestinal:  Negative for diarrhea, nausea and vomiting.   Musculoskeletal:  Positive for joint swelling.   Skin:  Negative for wound.   Neurological:  Negative for numbness.       Current Outpatient Medications on File Prior to Visit   Medication Sig   • cholecalciferol (VITAMIN D3) 1,000 units tablet Take 1 tablet (1,000 Units total) by mouth daily   • clobetasol (TEMOVATE) 0.05 % cream APPLY TO AFFECTED SKIN 2 WEEKS ON AND ONE WEEK OFF.   • colchicine (COLCRYS) 0.6 mg tablet Take 1 tablet (0.6 mg total) by mouth daily   • hydrochlorothiazide (HYDRODIURIL) 12.5 mg tablet Take 1 tablet (12.5 mg total) by mouth daily   • lisinopril (ZESTRIL) 40 mg tablet TAKE 1 TABLET BY MOUTH EVERY DAY   • lovastatin (MEVACOR) 40 MG tablet TAKE 1 TABLET BY MOUTH EVERY DAY   • methylPREDNISolone 4 MG tablet therapy pack Use as directed on package   • tadalafil (CIALIS) 20 MG tablet Take 1 tablet (20 mg total) by mouth daily as needed for erectile dysfunction   • Tremfya subcutaneous injection every 3 (three) months       Objective     /73   Pulse 92   Ht 5' 11\" (1.803 m)   Wt 84 kg (185 lb 3.2 oz)   BMI 25.83 kg/m²     Physical Exam  Feet:      Comments: " Vascular: Intact pedal pulses bilateral DP and PT.  Neurological: Gross protective sensation intact bilateral  Musculoskeletal: Muscle strength bilateral intact with dorsiflexion, inversion, eversion and plantarflexion.  There is some tenderness and swelling noted to the level of first metatarsophalangeal joint left foot today on examination.  His right foot there is some mild discomfort noted to this area.  No signs of infection noted currently.  Dermatological: No open lesions or ulcerations noted bilateral.

## 2024-04-23 ENCOUNTER — TELEPHONE (OUTPATIENT)
Dept: HEMATOLOGY ONCOLOGY | Facility: CLINIC | Age: 69
End: 2024-04-23

## 2024-04-23 NOTE — TELEPHONE ENCOUNTER
Appointment Change  Cancel, Reschedule, Change to Virtual      Who are you speaking with? Patient   If it is not the patient, is the caller listed on the communication consent form? N/A   Which provider is the appointment scheduled with? Dr. Chatman   When was the original appointment scheduled?    Please list date and time 5/14/24 8:20 AM   At which location is the appointment scheduled to take place? [place]   Was the appointment rescheduled?     Was the appointment changed from an in person visit to a virtual visit?    If so, please list the details of the change. 5/24/24 9:20 AM  [place]   What is the reason for the appointment change? Provider requested location change. Pt aware new appt is at [place] location.        Was STAR transport scheduled? No   Does STAR transport need to be scheduled for the new visit (if applicable) No   Does the patient need an infusion appointment rescheduled? No   Does the patient have an upcoming infusion appointment scheduled? If so, when? No   Is the patient undergoing chemotherapy? No   For appointments cancelled with less than 24 hours:  Was the no-show policy reviewed? Yes

## 2024-05-20 ENCOUNTER — APPOINTMENT (OUTPATIENT)
Dept: LAB | Facility: CLINIC | Age: 69
End: 2024-05-20
Payer: MEDICARE

## 2024-05-20 DIAGNOSIS — D47.2 SMOLDERING MULTIPLE MYELOMA: ICD-10-CM

## 2024-05-20 LAB
ALBUMIN SERPL BCP-MCNC: 3.9 G/DL (ref 3.5–5)
ALP SERPL-CCNC: 51 U/L (ref 34–104)
ALT SERPL W P-5'-P-CCNC: 10 U/L (ref 7–52)
ANION GAP SERPL CALCULATED.3IONS-SCNC: 7 MMOL/L (ref 4–13)
AST SERPL W P-5'-P-CCNC: 15 U/L (ref 13–39)
BASOPHILS # BLD AUTO: 0.02 THOUSANDS/ÂΜL (ref 0–0.1)
BASOPHILS NFR BLD AUTO: 1 % (ref 0–1)
BILIRUB SERPL-MCNC: 0.44 MG/DL (ref 0.2–1)
BUN SERPL-MCNC: 41 MG/DL (ref 5–25)
CALCIUM SERPL-MCNC: 9.7 MG/DL (ref 8.4–10.2)
CHLORIDE SERPL-SCNC: 106 MMOL/L (ref 96–108)
CO2 SERPL-SCNC: 27 MMOL/L (ref 21–32)
CREAT SERPL-MCNC: 2.23 MG/DL (ref 0.6–1.3)
EOSINOPHIL # BLD AUTO: 0.08 THOUSAND/ÂΜL (ref 0–0.61)
EOSINOPHIL NFR BLD AUTO: 2 % (ref 0–6)
ERYTHROCYTE [DISTWIDTH] IN BLOOD BY AUTOMATED COUNT: 17.5 % (ref 11.6–15.1)
GFR SERPL CREATININE-BSD FRML MDRD: 29 ML/MIN/1.73SQ M
GLUCOSE P FAST SERPL-MCNC: 82 MG/DL (ref 65–99)
HCT VFR BLD AUTO: 44.6 % (ref 36.5–49.3)
HGB BLD-MCNC: 13.6 G/DL (ref 12–17)
IGA SERPL-MCNC: 134 MG/DL (ref 66–433)
IGG SERPL-MCNC: 1405 MG/DL (ref 635–1741)
IGM SERPL-MCNC: 37 MG/DL (ref 45–281)
IMM GRANULOCYTES # BLD AUTO: 0.01 THOUSAND/UL (ref 0–0.2)
IMM GRANULOCYTES NFR BLD AUTO: 0 % (ref 0–2)
LYMPHOCYTES # BLD AUTO: 1.84 THOUSANDS/ÂΜL (ref 0.6–4.47)
LYMPHOCYTES NFR BLD AUTO: 51 % (ref 14–44)
MCH RBC QN AUTO: 27.2 PG (ref 26.8–34.3)
MCHC RBC AUTO-ENTMCNC: 30.5 G/DL (ref 31.4–37.4)
MCV RBC AUTO: 89 FL (ref 82–98)
MONOCYTES # BLD AUTO: 0.23 THOUSAND/ÂΜL (ref 0.17–1.22)
MONOCYTES NFR BLD AUTO: 7 % (ref 4–12)
NEUTROPHILS # BLD AUTO: 1.38 THOUSANDS/ÂΜL (ref 1.85–7.62)
NEUTS SEG NFR BLD AUTO: 39 % (ref 43–75)
NRBC BLD AUTO-RTO: 0 /100 WBCS
PLATELET # BLD AUTO: 142 THOUSANDS/UL (ref 149–390)
PMV BLD AUTO: 10.5 FL (ref 8.9–12.7)
POTASSIUM SERPL-SCNC: 4.6 MMOL/L (ref 3.5–5.3)
PROT SERPL-MCNC: 7.2 G/DL (ref 6.4–8.4)
RBC # BLD AUTO: 5 MILLION/UL (ref 3.88–5.62)
SODIUM SERPL-SCNC: 140 MMOL/L (ref 135–147)
WBC # BLD AUTO: 3.56 THOUSAND/UL (ref 4.31–10.16)

## 2024-05-20 PROCEDURE — 80053 COMPREHEN METABOLIC PANEL: CPT

## 2024-05-20 PROCEDURE — 85025 COMPLETE CBC W/AUTO DIFF WBC: CPT

## 2024-05-20 PROCEDURE — 84165 PROTEIN E-PHORESIS SERUM: CPT

## 2024-05-20 PROCEDURE — 83521 IG LIGHT CHAINS FREE EACH: CPT

## 2024-05-20 PROCEDURE — 36415 COLL VENOUS BLD VENIPUNCTURE: CPT

## 2024-05-20 PROCEDURE — 82784 ASSAY IGA/IGD/IGG/IGM EACH: CPT

## 2024-05-21 LAB
ALBUMIN SERPL ELPH-MCNC: 3.91 G/DL (ref 3.2–5.1)
ALBUMIN SERPL ELPH-MCNC: 57.5 % (ref 48–70)
ALPHA1 GLOB SERPL ELPH-MCNC: 0.29 G/DL (ref 0.15–0.47)
ALPHA1 GLOB SERPL ELPH-MCNC: 4.3 % (ref 1.8–7)
ALPHA2 GLOB SERPL ELPH-MCNC: 0.59 G/DL (ref 0.42–1.04)
ALPHA2 GLOB SERPL ELPH-MCNC: 8.7 % (ref 5.9–14.9)
BETA GLOB ABNORMAL SERPL ELPH-MCNC: 0.38 G/DL (ref 0.31–0.57)
BETA1 GLOB SERPL ELPH-MCNC: 5.6 % (ref 4.7–7.7)
BETA2 GLOB SERPL ELPH-MCNC: 4.6 % (ref 3.1–7.9)
BETA2+GAMMA GLOB SERPL ELPH-MCNC: 0.31 G/DL (ref 0.2–0.58)
GAMMA GLOB ABNORMAL SERPL ELPH-MCNC: 1.31 G/DL (ref 0.4–1.66)
GAMMA GLOB SERPL ELPH-MCNC: 19.3 % (ref 6.9–22.3)
IGG/ALB SER: 1.35 {RATIO} (ref 1.1–1.8)
KAPPA LC FREE SER-MCNC: 155.2 MG/L (ref 3.3–19.4)
KAPPA LC FREE/LAMBDA FREE SER: 8.08 {RATIO} (ref 0.26–1.65)
LAMBDA LC FREE SERPL-MCNC: 19.2 MG/L (ref 5.7–26.3)
M PROTEIN 1 MFR SERPL ELPH: 9.7 %
M PROTEIN 1 SERPL ELPH-MCNC: 0.66 G/DL
PROT PATTERN SERPL ELPH-IMP: NORMAL
PROT SERPL-MCNC: 6.8 G/DL (ref 6.4–8.2)

## 2024-05-21 PROCEDURE — 84165 PROTEIN E-PHORESIS SERUM: CPT | Performed by: PATHOLOGY

## 2024-05-24 ENCOUNTER — OFFICE VISIT (OUTPATIENT)
Dept: HEMATOLOGY ONCOLOGY | Facility: CLINIC | Age: 69
End: 2024-05-24
Payer: MEDICARE

## 2024-05-24 VITALS
OXYGEN SATURATION: 98 % | DIASTOLIC BLOOD PRESSURE: 86 MMHG | TEMPERATURE: 97.5 F | RESPIRATION RATE: 18 BRPM | WEIGHT: 185 LBS | HEART RATE: 78 BPM | SYSTOLIC BLOOD PRESSURE: 120 MMHG | BODY MASS INDEX: 25.9 KG/M2 | HEIGHT: 71 IN

## 2024-05-24 DIAGNOSIS — D47.2 SMOLDERING MULTIPLE MYELOMA: Primary | ICD-10-CM

## 2024-05-24 DIAGNOSIS — I10 HYPERTENSION: ICD-10-CM

## 2024-05-24 DIAGNOSIS — C90.00 INDOLENT MULTIPLE MYELOMA (HCC): ICD-10-CM

## 2024-05-24 PROBLEM — R71.8 ABNORMAL RBC: Status: RESOLVED | Noted: 2017-08-08 | Resolved: 2024-05-24

## 2024-05-24 PROCEDURE — 99214 OFFICE O/P EST MOD 30 MIN: CPT | Performed by: INTERNAL MEDICINE

## 2024-05-24 RX ORDER — HYDROCHLOROTHIAZIDE 12.5 MG/1
12.5 TABLET ORAL DAILY
Qty: 90 TABLET | Refills: 1 | Status: SHIPPED | OUTPATIENT
Start: 2024-05-24

## 2024-05-24 NOTE — PROGRESS NOTES
Hematology Outpatient Follow - Up Note  Jovanni Flor 68 y.o. 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62 High 5.35 High 4.86 High 4.82 High 5.57 High    No evidence of anemia     Elevated creatinine with chronic kidney disease grade 3, biopsy showed hypertensive nephropathy     Bone marrow biopsy on February 2023 showed 15 to 18% plasma cells no evidence of Congo red staining to suggest amyloidosis     Hyperuricemia    Now with the increasing of the kappa light chain to 155 lambda light chain 19 with a ratio of 8 will make more frequent blood work like every 3 months I would order PET scan for smoldering multiple myeloma and would proceed from there           Labs and imaging studies are reviewed by ordering provider once results are available. If there are findings that need immediate attention, you will be contacted when results available.   Discussing results and the implication on your healthcare is best discussed in person at your follow-up visit.       HPI:  Jovanni Flor is a 68 y.o. seen for initial consultation 12/5/2019 at the referral of Jessi Mckenna DO regarding MGUS and elevated H/H.       9/4/2019:  Normal quantitative immunoglobulins.  Creatinine 1.61 otherwise normal CMP.  Creatinine has been steadily been  increasing slowly since 2016.  He follows with Dr. Mckenna regarding his CKD.    Hemoglobin 16.3, hematocrit 51.2, white blood cell count 8.2 with normal differential, platelet count 191      10/21/2019:  SPEP with immunofixation identified IgG kappa monoclonal band measuring 0.3 gram/deciliter      Workup for MGUS 12/2019 showed normal quantitative immunoglobulins, kappa light chain 14.7, lambda light chain 11.1, erythropoietin 5.4, negative for JAK2 mutation profile as well as MPL 1, CALR for polycythemia, hemoglobin electrophoresis normal.        Does not smoke or drink.     He has psoriasis followed by Dr. Leon.       He follows with Dr. Ortiz regarding small volume Deanna 6 prostate cancer diagnosed on an initial prostate biopsy in 2017. In 2018, a surveillance biopsy again showed 3 of 12 cores with Deanna 6 prostate cancer.  He is on active surveillance.     Bone marrow biopsy in April 2022 showed 12-15% plasma cells, normal cytogenetics, fish panel for multiple myeloma is normal, molecular test however showed NF1 variant of unknown clinical significance.  PSMA PET scan 5/2022 Variable mild radiotracer uptake in the previously noted prominent external iliac chain and right inguinal lymph nodes, nonspecific.  No PSMA avid osseous lesions.     6/13/22 Lymph node biopsy was inconclusive for metastatic carcinoma or lymphoma.     6/2022- colonoscopy.      12/2022 - MRI of the prostate -PI-RADSv2.1 Category 2 - Low (clinically significant cancer is unlikely to be present). No extraprostatic tumor, seminal vesicle invasion, or pelvic osseous metastatic disease.  Slightly decreased size of pelvic lymphadenopathy, in keeping with the reactive lymph node pathology on recent right external iliac lymph node biopsy 6/13/2022.  Bone marrow biopsy on 2/21/2023 showed 15 to 18% kappa restricted plasma cell, negative for Congo red staining, 40% cellularity, persistent NF1 mutation of unknown significance          M  "protein (IgG Kappa) IgG Kappa Ratio     6/2022 0.5 1330 62 3.7     10/22 0.63 1440 91 5.6     2/23 0.67 1460 98 4.8     4/23 0.81 -------- 102 5.3     7/23 0.73 1570 113 6.6      11/23  0.53  1368  110  6.1        No evidence of anemia, hypercalcemia.  Total protein and albumin remain normal.    Interval History:        Previous Treatment:         Test Results:    Imaging: No results found.    Labs:   Lab Results   Component Value Date    WBC 3.56 (L) 05/20/2024    HGB 13.6 05/20/2024    HCT 44.6 05/20/2024    MCV 89 05/20/2024     (L) 05/20/2024     Lab Results   Component Value Date     11/30/2017     11/30/2017     11/30/2017     11/30/2017    K 4.6 05/20/2024     05/20/2024    CO2 27 05/20/2024    BUN 41 (H) 05/20/2024    CREATININE 2.23 (H) 05/20/2024    GLUF 82 05/20/2024    CALCIUM 9.7 05/20/2024    CORRECTEDCA 10.9 (H) 11/21/2022    AST 15 05/20/2024    ALT 10 05/20/2024    ALKPHOS 51 05/20/2024    PROT 7.1 11/30/2017    PROT 7.1 11/30/2017    PROT 7.1 11/30/2017    PROT 7.1 11/30/2017    BILITOT 0.5 11/30/2017    BILITOT 0.5 11/30/2017    BILITOT 0.5 11/30/2017    BILITOT 0.5 11/30/2017    EGFR 29 05/20/2024       No results found for: \"IRON\", \"TIBC\", \"FERRITIN\"    Lab Results   Component Value Date    RXRNLLEE29 422 08/08/2017         ROS: Review of Systems   Constitutional: Negative.  Negative for appetite change, chills, diaphoresis, fatigue, fever and unexpected weight change.   HENT:   Negative for hearing loss, lump/mass, mouth sores, nosebleeds, sore throat, trouble swallowing and voice change.    Eyes: Negative.  Negative for eye problems and icterus.   Respiratory: Negative.  Negative for chest tightness, cough, hemoptysis and shortness of breath.    Cardiovascular:  Negative for chest pain and leg swelling.   Gastrointestinal:  Negative for abdominal distention, abdominal pain, blood in stool, constipation, diarrhea and nausea.   Endocrine: Negative.  "   Genitourinary:  Negative for dysuria, frequency, hematuria and pelvic pain.    Musculoskeletal: Negative.  Negative for arthralgias, back pain, flank pain, gait problem, myalgias and neck stiffness.   Skin:  Negative for itching and rash.   Neurological:  Negative for dizziness, gait problem, headaches, light-headedness, numbness and speech difficulty.   Hematological:  Negative for adenopathy. Does not bruise/bleed easily.   Psychiatric/Behavioral:  Negative for confusion, decreased concentration, depression and sleep disturbance. The patient is not nervous/anxious.           Current Medications: Reviewed  Allergies: Reviewed  PMH/FH/SH:  Reviewed      Physical Exam:    Body surface area is 2.04 meters squared.    Wt Readings from Last 3 Encounters:   05/24/24 83.9 kg (185 lb)   04/16/24 84 kg (185 lb 3.2 oz)   03/19/24 83.5 kg (184 lb)        Temp Readings from Last 3 Encounters:   05/24/24 97.5 °F (36.4 °C) (Tympanic)   11/14/23 97.8 °F (36.6 °C) (Temporal)   07/13/23 97.9 °F (36.6 °C) (Temporal)        BP Readings from Last 3 Encounters:   05/24/24 120/86   04/16/24 120/73   03/19/24 124/78         Pulse Readings from Last 3 Encounters:   05/24/24 78   04/16/24 92   03/19/24 72        Physical Exam  Vitals reviewed.   Constitutional:       General: He is not in acute distress.     Appearance: He is well-developed. He is not diaphoretic.   HENT:      Head: Normocephalic and atraumatic.   Eyes:      Conjunctiva/sclera: Conjunctivae normal.   Neck:      Trachea: No tracheal deviation.   Cardiovascular:      Rate and Rhythm: Normal rate and regular rhythm.      Heart sounds: No murmur heard.     No friction rub. No gallop.   Pulmonary:      Effort: Pulmonary effort is normal. No respiratory distress.      Breath sounds: No decreased air movement. No decreased breath sounds, wheezing or rales.   Chest:      Chest wall: No tenderness.   Abdominal:      General: There is no distension.      Palpations: Abdomen is  soft. There is no hepatomegaly or splenomegaly.      Tenderness: There is no abdominal tenderness.   Musculoskeletal:      Cervical back: Normal range of motion and neck supple.      Right lower leg: No edema.      Left lower leg: No edema.   Lymphadenopathy:      Head:      Right side of head: No submental or submandibular adenopathy.      Left side of head: No submental or submandibular adenopathy.      Cervical: No cervical adenopathy.      Upper Body:      Right upper body: No supraclavicular or axillary adenopathy.      Left upper body: No supraclavicular or axillary adenopathy.      Lower Body: No right inguinal adenopathy. No left inguinal adenopathy.   Skin:     General: Skin is warm and dry.      Coloration: Skin is not pale.      Findings: No erythema or rash.   Neurological:      General: No focal deficit present.      Mental Status: He is alert and oriented to person, place, and time.   Psychiatric:         Behavior: Behavior normal.         Thought Content: Thought content normal.         Judgment: Judgment normal.         ECOG PS:    Goals and Barriers:  Current Goal: Minimize effects of disease.   Barriers: None.      Patient's Capacity to Self Care:  Patient is able to self care.    Code Status: @[facility]@

## 2024-05-27 DIAGNOSIS — I10 HYPERTENSION, UNSPECIFIED TYPE: ICD-10-CM

## 2024-05-28 RX ORDER — LISINOPRIL 40 MG/1
40 TABLET ORAL DAILY
Qty: 90 TABLET | Refills: 1 | Status: SHIPPED | OUTPATIENT
Start: 2024-05-28

## 2024-06-10 ENCOUNTER — OFFICE VISIT (OUTPATIENT)
Dept: URGENT CARE | Facility: MEDICAL CENTER | Age: 69
End: 2024-06-10
Payer: MEDICARE

## 2024-06-10 VITALS
WEIGHT: 181.6 LBS | RESPIRATION RATE: 20 BRPM | HEART RATE: 108 BPM | SYSTOLIC BLOOD PRESSURE: 102 MMHG | TEMPERATURE: 102.5 F | OXYGEN SATURATION: 97 % | HEIGHT: 71 IN | BODY MASS INDEX: 25.42 KG/M2 | DIASTOLIC BLOOD PRESSURE: 70 MMHG

## 2024-06-10 DIAGNOSIS — B96.89 ACUTE BACTERIAL SINUSITIS: Primary | ICD-10-CM

## 2024-06-10 DIAGNOSIS — J01.90 ACUTE BACTERIAL SINUSITIS: Primary | ICD-10-CM

## 2024-06-10 PROCEDURE — 99213 OFFICE O/P EST LOW 20 MIN: CPT | Performed by: PHYSICIAN ASSISTANT

## 2024-06-10 PROCEDURE — G0463 HOSPITAL OUTPT CLINIC VISIT: HCPCS | Performed by: PHYSICIAN ASSISTANT

## 2024-06-10 RX ORDER — PREDNISONE 10 MG/1
TABLET ORAL
Qty: 30 TABLET | Refills: 0 | Status: SHIPPED | OUTPATIENT
Start: 2024-06-10 | End: 2024-06-18

## 2024-06-10 RX ORDER — AZITHROMYCIN 250 MG/1
TABLET, FILM COATED ORAL
Qty: 6 TABLET | Refills: 0 | Status: SHIPPED | OUTPATIENT
Start: 2024-06-10 | End: 2024-06-14

## 2024-06-10 NOTE — PROGRESS NOTES
[facility]        NAME: Jovanni Flor is a 68 y.o. male  : 1955    MRN: [id number]  DATE: Melissa 10, 2024  TIME: 5:56 PM    Assessment and Plan   Acute bacterial sinusitis [J01.90, B96.89]  1. Acute bacterial sinusitis  azithromycin (ZITHROMAX) 250 mg tablet    predniSONE 10 mg tablet            Patient Instructions       Follow up with PCP in 3-5 days.  Proceed to  ER if symptoms worsen.    If tests have been performed at [facility], our office will contact you with results if changes need to be made to the care plan discussed with you at the visit.  You can review your full results on [facility].    Chief Complaint     Chief Complaint   Patient presents with    Facial Pain     Pt c/o pressure in forehead, runny nose, cough and congestion in chest after mowing grass 10 days ago - persisted  since then - mowed grass again yesterday and became SOB and had muscle cramps in legs causing him to have difficulty walking.  Same symptoms persist today         History of Present Illness       Sinusitis  This is a new problem. The current episode started 1 to 4 weeks ago. The problem has been gradually worsening since onset. The maximum temperature recorded prior to his arrival was 102 - 102.9 F. The fever has been present for Less than 1 day. The pain is mild. Associated symptoms include congestion, coughing, ear pain, headaches, sinus pressure and a sore throat. Pertinent negatives include no chills, diaphoresis, hoarse voice, neck pain, shortness of breath, sneezing or swollen glands. Past treatments include nasal decongestants. The treatment provided no relief.       Review of Systems   Review of Systems   Constitutional:  Negative for chills and diaphoresis.   HENT:  Positive for congestion, ear pain, sinus pressure and sore throat. Negative for hoarse voice and sneezing.    Respiratory:  Positive for cough. Negative for shortness of breath.    Musculoskeletal:  Negative for neck pain.    Neurological:  Positive for headaches.   All other systems reviewed and are negative.        Current Medications       Current Outpatient Medications:     azithromycin (ZITHROMAX) 250 mg tablet, Take 2 tablets today then 1 tablet daily x 4 days, Disp: 6 tablet, Rfl: 0    cholecalciferol (VITAMIN D3) 1,000 units tablet, Take 1 tablet (1,000 Units total) by mouth daily, Disp: , Rfl:     clobetasol (TEMOVATE) 0.05 % cream, APPLY TO AFFECTED SKIN 2 WEEKS ON AND ONE WEEK OFF., Disp: , Rfl:     hydroCHLOROthiazide 12.5 mg tablet, TAKE 1 TABLET BY MOUTH EVERY DAY, Disp: 90 tablet, Rfl: 1    lisinopril (ZESTRIL) 40 mg tablet, TAKE 1 TABLET BY MOUTH EVERY DAY, Disp: 90 tablet, Rfl: 1    lovastatin (MEVACOR) 40 MG tablet, TAKE 1 TABLET BY MOUTH EVERY DAY, Disp: 90 tablet, Rfl: 1    predniSONE 10 mg tablet, 5 x 4 days, 4 x 1, 3 x 1, 2 x 1, 1 x 1, Disp: 30 tablet, Rfl: 0    tadalafil (CIALIS) 20 MG tablet, Take 1 tablet (20 mg total) by mouth daily as needed for erectile dysfunction, Disp: 15 tablet, Rfl: 3    Current Allergies     Allergies as of 06/10/2024 - Reviewed 06/10/2024   Allergen Reaction Noted    Amlodipine Hives 07/23/2018    Penicillins Hives 08/29/2017    Fenofibrate Itching and Rash 07/10/2015    Iodinated contrast media Rash 06/06/2022            The following portions of the patient's history were reviewed and updated as appropriate: allergies, current medications, past family history, past medical history, past social history, past surgical history and problem list.     Past Medical History:   Diagnosis Date    Adenocarcinoma of prostate (HCC)     Last assessed 08/08/17    Chronic kidney disease     Colon polyp     Gout     Psoriasis        Past Surgical History:   Procedure Laterality Date    COLONOSCOPY      IR BIOPSY BONE MARROW  2/21/2023    IR BIOPSY KIDNEY RANDOM  4/19/2023    IR BIOPSY LYMPH NODE  6/13/2022    MS COLONOSCOPY FLX DX W/COLLJ SPEC WHEN PFRMD N/A 08/31/2018    Procedure: COLONOSCOPY;   "Surgeon: Brennon Aldana MD;  Location:  GI LAB;  Service: Colorectal    PROSTATE BIOPSY  02/16/2018    TONSILLECTOMY         Family History   Problem Relation Age of Onset    Arthritis Mother     Hyperlipidemia Mother     Hypertension Mother     Diabetes Son          Medications have been verified.        Objective   /70   Pulse (!) 108   Temp (!) 102.5 °F (39.2 °C) (Tympanic)   Resp 20   Ht 5' 10.5\" (1.791 m)   Wt 82.4 kg (181 lb 9.6 oz)   SpO2 97%   BMI 25.69 kg/m²   No LMP for male patient.       Physical Exam     Physical Exam  Vitals and nursing note reviewed.   Constitutional:       Appearance: Normal appearance. He is normal weight.   HENT:      Right Ear: Ear canal and external ear normal.      Left Ear: Ear canal and external ear normal.      Nose: Congestion and rhinorrhea present.      Mouth/Throat:      Mouth: Mucous membranes are moist.      Pharynx: Posterior oropharyngeal erythema present. No oropharyngeal exudate.   Eyes:      Conjunctiva/sclera: Conjunctivae normal.   Cardiovascular:      Rate and Rhythm: Normal rate and regular rhythm.      Pulses: Normal pulses.      Heart sounds: Normal heart sounds.   Pulmonary:      Effort: Pulmonary effort is normal.      Breath sounds: Normal breath sounds.   Lymphadenopathy:      Cervical: No cervical adenopathy.   Neurological:      Mental Status: He is alert.   Psychiatric:         Mood and Affect: Mood normal.         Behavior: Behavior normal.           Maxillary tenderness negative frontal tenderness.  Poor transillumination maxillary sinuses.        "

## 2024-06-11 ENCOUNTER — TELEPHONE (OUTPATIENT)
Dept: OTHER | Facility: OTHER | Age: 69
End: 2024-06-11

## 2024-06-11 NOTE — TELEPHONE ENCOUNTER
Patient was seen in Urgent Care for sinus infection and was prescribed Prednisone.  Patient would like to make sure Dr. Mckenna is okay with him taking this medication.    Please call patient back.  Thank you

## 2024-06-12 ENCOUNTER — TELEPHONE (OUTPATIENT)
Dept: NEPHROLOGY | Facility: CLINIC | Age: 69
End: 2024-06-12

## 2024-06-12 ENCOUNTER — TELEPHONE (OUTPATIENT)
Dept: FAMILY MEDICINE CLINIC | Facility: CLINIC | Age: 69
End: 2024-06-12

## 2024-06-12 NOTE — TELEPHONE ENCOUNTER
I received a call from the 's office that there is further investigation.  Patient passed away overnight.  Found with coffee-ground emesis.  Recent urgent care visit for infection otherwise chronic conditions were stable.

## 2024-06-12 NOTE — TELEPHONE ENCOUNTER
Left voicemail for the patient reminding to please complete labwork prior to 6/19 appointment with Dr. Mckenna. Advised patient to call back with any questions or  Concerns.

## 2024-06-17 ENCOUNTER — TELEPHONE (OUTPATIENT)
Age: 69
End: 2024-06-17

## 2024-06-17 NOTE — TELEPHONE ENCOUNTER
Caller: Radha Flor for Jovanni Flor    Doctor: Jacquie /Hazel    Reason for call: Radha requested that Dr. Dhillon give her a call. Thank you.     Call back#: [phone]

## 2024-06-17 NOTE — TELEPHONE ENCOUNTER
I l/m inquiring as to what needs to be discussed over the phone as pt has an appt. scheduled for tomorrow. I asked that they call back or send a message via [employer].

## 2024-06-17 NOTE — TELEPHONE ENCOUNTER
Patients wife called and stated she has multiple concerns in regards to patient that she would like to discuss urgently with the pcp. Patients wife did not discuss any other details of her concerns. Please return patients wife call to discuss.